# Patient Record
Sex: FEMALE | Race: BLACK OR AFRICAN AMERICAN | NOT HISPANIC OR LATINO | URBAN - METROPOLITAN AREA
[De-identification: names, ages, dates, MRNs, and addresses within clinical notes are randomized per-mention and may not be internally consistent; named-entity substitution may affect disease eponyms.]

---

## 2017-11-12 ENCOUNTER — EMERGENCY (EMERGENCY)
Facility: HOSPITAL | Age: 57
LOS: 1 days | Discharge: HOME | End: 2017-11-12
Attending: EMERGENCY MEDICINE | Admitting: EMERGENCY MEDICINE
Payer: SELF-PAY

## 2017-11-12 VITALS
DIASTOLIC BLOOD PRESSURE: 57 MMHG | OXYGEN SATURATION: 99 % | TEMPERATURE: 98 F | SYSTOLIC BLOOD PRESSURE: 100 MMHG | HEART RATE: 71 BPM | RESPIRATION RATE: 16 BRPM

## 2017-11-12 LAB
ALBUMIN SERPL ELPH-MCNC: 4.2 G/DL — SIGNIFICANT CHANGE UP (ref 3.3–5)
ALP SERPL-CCNC: 69 U/L — SIGNIFICANT CHANGE UP (ref 40–120)
ALT FLD-CCNC: 59 U/L — HIGH (ref 4–33)
AST SERPL-CCNC: 36 U/L — HIGH (ref 4–32)
BILIRUB SERPL-MCNC: 0.6 MG/DL — SIGNIFICANT CHANGE UP (ref 0.2–1.2)
BUN SERPL-MCNC: 10 MG/DL — SIGNIFICANT CHANGE UP (ref 7–23)
CALCIUM SERPL-MCNC: 9.4 MG/DL — SIGNIFICANT CHANGE UP (ref 8.4–10.5)
CHLORIDE SERPL-SCNC: 100 MMOL/L — SIGNIFICANT CHANGE UP (ref 98–107)
CK MB BLD-MCNC: 0.5 — SIGNIFICANT CHANGE UP (ref 0–2.5)
CK MB BLD-MCNC: 1 NG/ML — SIGNIFICANT CHANGE UP (ref 1–4.7)
CK MB BLD-MCNC: 1 NG/ML — SIGNIFICANT CHANGE UP (ref 1–4.7)
CK SERPL-CCNC: 197 U/L — HIGH (ref 25–170)
CK SERPL-CCNC: 201 U/L — HIGH (ref 25–170)
CO2 SERPL-SCNC: 28 MMOL/L — SIGNIFICANT CHANGE UP (ref 22–31)
CREAT SERPL-MCNC: 0.65 MG/DL — SIGNIFICANT CHANGE UP (ref 0.5–1.3)
D DIMER BLD IA.RAPID-MCNC: < 150 NG/ML — SIGNIFICANT CHANGE UP
GLUCOSE SERPL-MCNC: 103 MG/DL — HIGH (ref 70–99)
HBA1C BLD-MCNC: 5.6 % — SIGNIFICANT CHANGE UP (ref 4–5.6)
HCT VFR BLD CALC: 41.2 % — SIGNIFICANT CHANGE UP (ref 34.5–45)
HGB BLD-MCNC: 13.3 G/DL — SIGNIFICANT CHANGE UP (ref 11.5–15.5)
MCHC RBC-ENTMCNC: 26.7 PG — LOW (ref 27–34)
MCHC RBC-ENTMCNC: 32.3 % — SIGNIFICANT CHANGE UP (ref 32–36)
MCV RBC AUTO: 82.6 FL — SIGNIFICANT CHANGE UP (ref 80–100)
NRBC # FLD: 0 — SIGNIFICANT CHANGE UP
PLATELET # BLD AUTO: 230 K/UL — SIGNIFICANT CHANGE UP (ref 150–400)
PMV BLD: 11.9 FL — SIGNIFICANT CHANGE UP (ref 7–13)
POTASSIUM SERPL-MCNC: 3.9 MMOL/L — SIGNIFICANT CHANGE UP (ref 3.5–5.3)
POTASSIUM SERPL-SCNC: 3.9 MMOL/L — SIGNIFICANT CHANGE UP (ref 3.5–5.3)
PROT SERPL-MCNC: 7.7 G/DL — SIGNIFICANT CHANGE UP (ref 6–8.3)
RBC # BLD: 4.99 M/UL — SIGNIFICANT CHANGE UP (ref 3.8–5.2)
RBC # FLD: 14.8 % — HIGH (ref 10.3–14.5)
SODIUM SERPL-SCNC: 140 MMOL/L — SIGNIFICANT CHANGE UP (ref 135–145)
TROPONIN T SERPL-MCNC: < 0.06 NG/ML — SIGNIFICANT CHANGE UP (ref 0–0.06)
TROPONIN T SERPL-MCNC: < 0.06 NG/ML — SIGNIFICANT CHANGE UP (ref 0–0.06)
WBC # BLD: 3.81 K/UL — SIGNIFICANT CHANGE UP (ref 3.8–10.5)
WBC # FLD AUTO: 3.81 K/UL — SIGNIFICANT CHANGE UP (ref 3.8–10.5)

## 2017-11-12 PROCEDURE — 93010 ELECTROCARDIOGRAM REPORT: CPT | Mod: 59

## 2017-11-12 PROCEDURE — 93970 EXTREMITY STUDY: CPT | Mod: 26

## 2017-11-12 PROCEDURE — 99218: CPT

## 2017-11-12 PROCEDURE — 71020: CPT | Mod: 26

## 2017-11-12 RX ORDER — METOCLOPRAMIDE HCL 10 MG
10 TABLET ORAL ONCE
Qty: 0 | Refills: 0 | Status: COMPLETED | OUTPATIENT
Start: 2017-11-12 | End: 2017-11-12

## 2017-11-12 RX ORDER — GABAPENTIN 400 MG/1
300 CAPSULE ORAL ONCE
Qty: 0 | Refills: 0 | Status: COMPLETED | OUTPATIENT
Start: 2017-11-12 | End: 2017-11-12

## 2017-11-12 RX ORDER — ASPIRIN/CALCIUM CARB/MAGNESIUM 324 MG
162 TABLET ORAL ONCE
Qty: 0 | Refills: 0 | Status: COMPLETED | OUTPATIENT
Start: 2017-11-12 | End: 2017-11-12

## 2017-11-12 RX ORDER — GABAPENTIN 400 MG/1
300 CAPSULE ORAL
Qty: 0 | Refills: 0 | Status: DISCONTINUED | OUTPATIENT
Start: 2017-11-12 | End: 2017-11-16

## 2017-11-12 RX ORDER — GABAPENTIN 400 MG/1
0 CAPSULE ORAL
Qty: 0 | Refills: 0 | COMMUNITY

## 2017-11-12 RX ORDER — DIMETHYL FUMARATE 240 MG/1
240 CAPSULE ORAL
Qty: 0 | Refills: 0 | Status: DISCONTINUED | OUTPATIENT
Start: 2017-11-12 | End: 2017-11-16

## 2017-11-12 RX ORDER — DIMETHYL FUMARATE 240 MG/1
1 CAPSULE ORAL
Qty: 0 | Refills: 0 | COMMUNITY

## 2017-11-12 RX ADMIN — Medication 10 MILLIGRAM(S): at 21:25

## 2017-11-12 RX ADMIN — GABAPENTIN 300 MILLIGRAM(S): 400 CAPSULE ORAL at 21:25

## 2017-11-12 RX ADMIN — Medication 162 MILLIGRAM(S): at 18:15

## 2017-11-12 NOTE — ED CDU PROVIDER INITIAL DAY NOTE - MEDICAL DECISION MAKING DETAILS
Patient with CP and SOB- negative d-dimer and UD lower extremities, non ischemic ECG- will do serial enzymes and  pharm nuclear stress. Patient with CP and SOB- negative d-dimer and US lower extremities, non ischemic ECG- will do serial enzymes and  pharm nuclear stress.

## 2017-11-12 NOTE — ED PROVIDER NOTE - MEDICAL DECISION MAKING DETAILS
Chest pain , SOB with risk factors for PE, labs CTPA, us lower extrem ecg CE Chest pain , SOB with risk factors for PE, labs CTPA if d-dimer positive,, us lower extrem, ecg,CE

## 2017-11-12 NOTE — ED PROVIDER NOTE - INTERPRETATION
normal sinus rhythm, Normal axis, Normal IA interval and QRS complex. There are no acute ischemic ST or T-wave changes.

## 2017-11-12 NOTE — ED PROVIDER NOTE - MUSCULOSKELETAL MINIMAL EXAM
bilateral nterior thigh tenderness and calf tenderness, lower extremity nonpitting edema pulses present and equal/TENDERNESS/atraumatic/normal range of motion

## 2017-11-12 NOTE — ED ADULT NURSE NOTE - OBJECTIVE STATEMENT
Pt. A&Ox4, c/o SOB, cp, weakness and palpitations since 1:30p. Pmhx of MS. Pt. states she had similar sxs in the past but no known cause. CP is described as constant, sharp 5/10 pain,. located to left side #20g IV placed in right AC, labs sent. MD at bedside for eval. VSS, breathing well on RA. Non-labored at this time. Awaiting US and cxr. Will continue to monitor.

## 2017-11-12 NOTE — ED ADULT TRIAGE NOTE - CHIEF COMPLAINT QUOTE
Pt brought in by EMS from home complaining of sudden onset of SOB earlier today, respirations appear even and unlabored at this time. PMHx of MS. Pt denies chest pain, SOB, N/V/D.

## 2017-11-12 NOTE — ED PROVIDER NOTE - OBJECTIVE STATEMENT
sudden onset SOB with pleuritic left CP, lasted 5 min , after eating breakfast( oatmeal). Has hx of MS, uses cane but ambulatory, had trip from [place] October. C/O chronic leg pain and hip pain, notes some swelling lower extremities. Still feels a little SOB, mild substernal and left sided CP. No fever, no n/v/ abd pain. Not having flare at present of MS. Takes gabapentin and Tecfira sudden onset SOB with pleuritic left CP, lasted 5 min , after eating breakfast( oatmeal) today. Has hx of MS, uses cane but ambulatory, had trip from [place] October. C/O chronic leg pain and hip pain, notes some swelling lower extremities. Still feels a little SOB, mild substernal and left sided CP. No fever, no n/v/ abd pain. Not having flare at present of MS. Takes gabapentin and Tecfira

## 2017-11-12 NOTE — ED SUB INTERN NOTE - OBJECTIVE STATEMENT FT
56 yo female with PMH of MS on Tecfidera and gabapentin present to the ED with difficulty breathing today. Pt states as she sat at the dining table eating oatmeal she could not breathe. Recent travel from [place] in October 2017. Denies fever/chills, palpitations, N/V/D, cough

## 2017-11-12 NOTE — ED CDU PROVIDER INITIAL DAY NOTE - OBJECTIVE STATEMENT
57 year old female with sudden onset SOB with pleuritic left CP, lasted 5 min , after eating breakfast( oatmeal) today. Has hx of MS, uses cane but ambulatory, had trip from [place] October. C/O chronic leg pain and hip pain, notes some swelling lower extremities. Still feels a little SOB, mild substernal and left sided CP. No fever, no n/v/ abd pain. Not having flare at present of MS. Takes gabapentin and Tecfira no DM, HTN, smoking, HLD oe FH 57 year old female with sudden onset SOB with pleuritic left CP, lasted 5 min , after eating breakfast( oatmeal) today. Has hx of MS, uses cane but ambulatory, had trip from [place] October. C/O chronic leg pain and hip pain, notes some swelling lower extremities. Still feels a little SOB, mild substernal and left sided CP. No fever, no n/v/ abd pain. Not having flare at present of MS. Takes gabapentin and Tecfidera no DM, HTN, smoking, HLD or FH

## 2017-11-12 NOTE — ED CDU PROVIDER INITIAL DAY NOTE - PROGRESS NOTE DETAILS
HANG Han: pt NAD, VSS, pt feels like her breathing is still not at her baseline and having mild sternal/left sided chest pain. PE: cardiac: RRR, Lungs: CTA, abdomen: soft, nontender, nondistended. No events on tele - will continue to monitor. Discussed the results of the labs/imaging with the patient as well as the plan of care. Pending CE @22:00 and stress in am.

## 2017-11-13 VITALS
TEMPERATURE: 99 F | HEART RATE: 81 BPM | RESPIRATION RATE: 16 BRPM | DIASTOLIC BLOOD PRESSURE: 55 MMHG | OXYGEN SATURATION: 98 % | SYSTOLIC BLOOD PRESSURE: 103 MMHG

## 2017-11-13 PROCEDURE — 99217: CPT

## 2017-11-13 PROCEDURE — 93016 CV STRESS TEST SUPVJ ONLY: CPT | Mod: GC

## 2017-11-13 PROCEDURE — 78452 HT MUSCLE IMAGE SPECT MULT: CPT | Mod: 26

## 2017-11-13 PROCEDURE — 93018 CV STRESS TEST I&R ONLY: CPT | Mod: GC

## 2017-11-13 RX ORDER — ACETAMINOPHEN 500 MG
1000 TABLET ORAL ONCE
Qty: 0 | Refills: 0 | Status: DISCONTINUED | OUTPATIENT
Start: 2017-11-13 | End: 2017-11-13

## 2017-11-13 RX ORDER — METOCLOPRAMIDE HCL 10 MG
10 TABLET ORAL ONCE
Qty: 0 | Refills: 0 | Status: COMPLETED | OUTPATIENT
Start: 2017-11-13 | End: 2017-11-13

## 2017-11-13 RX ORDER — ACETAMINOPHEN 500 MG
1000 TABLET ORAL ONCE
Qty: 0 | Refills: 0 | Status: COMPLETED | OUTPATIENT
Start: 2017-11-13 | End: 2017-11-13

## 2017-11-13 RX ADMIN — Medication 400 MILLIGRAM(S): at 13:20

## 2017-11-13 RX ADMIN — DIMETHYL FUMARATE 240 MILLIGRAM(S): 240 CAPSULE ORAL at 09:26

## 2017-11-13 RX ADMIN — GABAPENTIN 300 MILLIGRAM(S): 400 CAPSULE ORAL at 09:25

## 2017-11-13 RX ADMIN — Medication 10 MILLIGRAM(S): at 13:09

## 2017-11-13 RX ADMIN — Medication 1000 MILLIGRAM(S): at 14:00

## 2017-11-13 NOTE — ED CDU PROVIDER SUBSEQUENT DAY NOTE - ATTENDING CONTRIBUTION TO CARE
STEVEN MARROQUIN  ATTENDING, MD: 58 yo F with a past medical history of MS, a/w B/L LE neuropathy presents with sudden onset of non-exertional SSCP that radiated to the left pectoral region with SOB lasting for about for 5 minutes.  Pt has chronic leg pain ambulates with a cane.  Denies F/C, N/V/D, abdominal pain, urinary complaints, no past medical history of clots or clotting d/o.  Patient states chest pain has resolved, but c/o HA which started last night and resolved with medication (Reglan as per EMR), but returned today.  Patient has a past medical history of migraines, but states this feels slightly sharp which is not typical of her migraines.  No focal deficits a/w HA.  No F/C, no photophobia.  No N/V.  No visual changes.  CE x 2 neg, stress test shows no evidence of infarct, but shows a small LV.  Tx HA and reassess.  STEVEN MARROQUIN, ATTENDING NOTE:  Patient is awake and alert and in no acute distress.  Normocephalic/atraumatic.  Auricles are normal.  Neck supple.  Lungs CTAB, no wheeze, no rhonchi,  no rales.  Heart is regular rate and rhythm.  Abdomen is soft, not distended +BS.  Back is nontender, no CVAT.  Moving all 4 extremities.   Neurologically grossly intact.  Affect is appropriate.   DR. MARROQUIN, ATTENDING MD-  I performed a face to face bedside interview with patient regarding history of present illness, review of symptoms and past medical history. I completed an independent physical exam.  I was not present for evaluation of this patient but I agree with note as stated above.  This includes during the time I functioned as the attending physician for this patient.

## 2017-11-13 NOTE — ED CDU PROVIDER SUBSEQUENT DAY NOTE - MEDICAL DECISION MAKING DETAILS
57 year old female with a  PMHx of MS, neuropathy pw sudden onset of nonexertional substernal chest pain that radiated to the left pectoral region associated with SOB for 5 minutes.  Plan: stress am

## 2017-11-13 NOTE — ED CDU PROVIDER SUBSEQUENT DAY NOTE - PROGRESS NOTE DETAILS
HANG Han: pt NAD, VSS, asymptomatic overnight - resting comfortably. No events on tele. EKG/CE wnl. Pending stress in am. HANG Palma: Pt reports mild right sided headache consistent with her migraines will Tylenol/Reglan.  Pt resting comfortably in bed NAD, pt awaiting stress test results. HANG Palma: pt feels better headache improved.  Stress test negative.  Discharge and results reviewed and discussed with patient.

## 2017-11-13 NOTE — ED CDU PROVIDER DISPOSITION NOTE - ATTENDING CONTRIBUTION TO CARE
DISCHARGE NOTE-STEVEN GARRIDO MD   Patient is alert and oriented to person, place and date.  Patient is appropriate.  Patient denies any new or worsening physical complaints.  Patient feeling better and wants to go home.  Patient is awake and alert and in no acute distress.  Normocephalic/atraumatic.  Auricles are normal.  Neck supple.  Lungs CTAB, no wheeze, no rhonchi,  no rales.  Heart is regular rate and rhythm.  Back is nontender.  Moving all 4 extremities.   Neurologically grossly intact.  Affect is appropriate. Results of testing discussed with patient at bedside.   Copies of results provided to patient for discharge.  Patient understands that they develop new or worsening symptoms, to return to the ER immediately or call their PMD.  Patient's vital signs remain stable.  Stable for discharge.    Discharge to home.    -Steven Garrido MD

## 2017-11-13 NOTE — ED CDU PROVIDER DISPOSITION NOTE - CLINICAL COURSE
STEVEN MARROQUIN  ATTENDING, MD: 56 yo F with a past medical history of MS, a/w B/L LE neuropathy presents with sudden onset of non-exertional SSCP that radiated to the left pectoral region with SOB lasting for about for 5 minutes.  Pt has chronic leg pain ambulates with a cane.  Denies F/C, N/V/D, abdominal pain, urinary complaints, no past medical history of clots or clotting d/o.  Patient states chest pain has resolved, but c/o HA which started last night and resolved with medication (Reglan as per EMR), but returned today.  Patient has a past medical history of migraines, but states this feels slightly sharp which is not typical of her migraines.  No focal deficits a/w HA.  No F/C, no photophobia.  No N/V.  No visual changes.  CE x 2 neg, stress test shows no evidence of infarct, but shows a small LV.  FIGUEREDO resolved.  STEVEN MARROQUIN, ATTENDING NOTE:  Patient is awake and alert and in no acute distress.  Normocephalic/atraumatic.  Auricles are normal.  Neck supple.  Lungs CTAB, no wheeze, no rhonchi,  no rales.  Heart is regular rate and rhythm.  Abdomen is soft, not distended +BS.  Back is nontender, no CVAT.  Moving all 4 extremities.   Neurologically grossly intact.  Affect is appropriate.

## 2017-11-13 NOTE — ED CDU PROVIDER SUBSEQUENT DAY NOTE - HISTORY
57 year old female with a  PMHx of MS, neuropathy pw sudden onset of nonexertional substernal chest pain that radiated to the left pectoral region associated with SOB for 5 minutes. Pt states that she was eating breakfast and a few minutes after the symptoms occurred (pt was at rest at the time).+travel hx fo [place] 10/17. Pt has chronic neuropathy leg pain bilaterally – uses cane to ambulate. Denies n/v/f/c, abdominal pain, dysuria, hematuria, constipation, diarrhea, hematochezia, melena, hx of clots/DVT/PE, tobacco use.  In the ED: EKG NSR, cxr neg, ce#1 wnl, bilateral Doppler neg. Repeat CE wnl, rpt ekg unchanged. Plan: stress in am.

## 2017-11-14 ENCOUNTER — EMERGENCY (EMERGENCY)
Facility: HOSPITAL | Age: 57
LOS: 1 days | Discharge: HOME | End: 2017-11-14
Attending: EMERGENCY MEDICINE | Admitting: EMERGENCY MEDICINE
Payer: SELF-PAY

## 2017-11-14 VITALS
RESPIRATION RATE: 16 BRPM | DIASTOLIC BLOOD PRESSURE: 48 MMHG | SYSTOLIC BLOOD PRESSURE: 149 MMHG | TEMPERATURE: 98 F | HEART RATE: 71 BPM | OXYGEN SATURATION: 100 %

## 2017-11-14 VITALS
OXYGEN SATURATION: 100 % | SYSTOLIC BLOOD PRESSURE: 136 MMHG | HEART RATE: 87 BPM | RESPIRATION RATE: 16 BRPM | TEMPERATURE: 98 F | DIASTOLIC BLOOD PRESSURE: 76 MMHG

## 2017-11-14 DIAGNOSIS — Z98.891 HISTORY OF UTERINE SCAR FROM PREVIOUS SURGERY: Chronic | ICD-10-CM

## 2017-11-14 PROCEDURE — 99283 EMERGENCY DEPT VISIT LOW MDM: CPT

## 2017-11-14 RX ORDER — IBUPROFEN 200 MG
600 TABLET ORAL ONCE
Qty: 0 | Refills: 0 | Status: COMPLETED | OUTPATIENT
Start: 2017-11-14 | End: 2017-11-14

## 2017-11-14 NOTE — ED ADULT NURSE NOTE - OBJECTIVE STATEMENT
pt. received in room 21 , A&Ox4 c/o midsternal chest pain , tender to touch. Pt. states to have a hx. MS  with ED visit 2xdays for sob. Pt. Vitals as noted, and in no acute distress. Placed on cardiac monitor. As per MD no labs needed at this moment. Will continue to monitor while in the ED.

## 2017-11-14 NOTE — ED PROVIDER NOTE - OBJECTIVE STATEMENT
Patient is a 58 y/o F w/ PMHx of MS presenting to the ED w/ SOB and pleuritic CP for 2 days in duration. Patient was recently admitted 11/2/17 w/ similar complaints and sent home after observation. Patient states she feels SOB that is exacerbated by walking. Patient uses cane to ambulate. Patient states that the SOB is intermittent. Patient denies dizziness, lightheadedness, headahce, fever, chills, NVD, palpitations, syncope, abd pain. Patient is a 56 y/o F w/ PMHx of MS presenting to the ED w/ SOB and pleuritic CP for 2 days in duration. Patient was recently admitted 11/2/17 w/ similar complaints and sent home after neg stress test, two neg trops, neg BLE doppler.  Patient states she feels SOB that is exacerbated by walking. Patient uses cane to ambulate. Patient states that the SOB is intermittent. Patient denies dizziness, lightheadedness, headahce, fever, chills, NVD, palpitations, syncope, abd pain.

## 2017-11-14 NOTE — ED PROVIDER NOTE - MEDICAL DECISION MAKING DETAILS
58 y/o F/ PMHx of MS presents to the ED w/ SOB and pleuritic CP. Low risk for PE, cardiac related etiology.  Will obtain cxr, ekg, give motrin for pain and reassess. 58 y/o F/ PMHx of MS presents to the ED w/ SOB and pleuritic CP. Low risk for PE, cardiac related etiology. Will obtain cxr, ekg, give motrin for pain and reassess.    Cabot: 57F with PMH of MS coming in with 2 days of intermittent CP and SOB.  She was admitted to the CDU yesterday, where she had neg stress test, two neg trops, neg BLE doppler.  She was discharged home and comes back today with the same complaints.  No F/C/cough/sputum.  No N/V/diaphoresis.  + pain at costochondral junction on L.  On exam, HDS, looks well, lungs CTAB, heart sounds normal, + TTP at L costochondral junction, no rashes, radial pulses 2+ bilat, no LE swelling or tenderness.  Likely costochondritis.  Had extensive workup yesterday that makes ACS, PE, dissection, PNA, PTX very unlikely.  Will treat pain and check EKG and CXR.

## 2017-11-14 NOTE — ED PROVIDER NOTE - PROGRESS NOTE DETAILS
Dr. Martin PGY1 - Patient is hemodynamically stable. Patient does not report any CP, dyspnea, SOB or any further/new complaints. Patient is stable to be medically discharged. Patient can follow up as outpatient with a primary cardiologist.

## 2017-11-14 NOTE — ED ADULT TRIAGE NOTE - CHIEF COMPLAINT QUOTE
Pt arrives w/ c/o intermittent episodes of SOB accompanied w/ chest pain. Denies nausea or vomiting. Hx of MS.

## 2017-11-14 NOTE — ED PROVIDER NOTE - ATTENDING CONTRIBUTION TO CARE
I, Jennifer Cabot, MD, have performed a history and physical exam of the patient and discussed their management with the resident. I reviewed the resident's note and agree with the documented findings and plan of care. My medical decision making and observations are found above.    Cabot: 57F with PMH of MS coming in with 2 days of intermittent CP and SOB.  She was admitted to the CDU yesterday, where she had neg stress test, two neg trops, neg BLE doppler.  She was discharged home and comes back today with the same complaints.  No F/C/cough/sputum.  No N/V/diaphoresis.  + pain at costochondral junction on L.  On exam, HDS, looks well, lungs CTAB, heart sounds normal, + TTP at L costochondral junction, no rashes, radial pulses 2+ bilat, no LE swelling or tenderness.  Likely costochondritis.  Had extensive workup yesterday that makes ACS, PE, dissection, PNA, PTX very unlikely.  Will treat pain and check EKG and CXR.

## 2017-11-15 PROCEDURE — 71020: CPT | Mod: 26

## 2017-11-15 RX ORDER — IBUPROFEN 200 MG
1 TABLET ORAL
Qty: 14 | Refills: 0 | OUTPATIENT
Start: 2017-11-15 | End: 2017-11-29

## 2017-11-15 RX ADMIN — Medication 600 MILLIGRAM(S): at 00:20

## 2017-12-17 ENCOUNTER — EMERGENCY (EMERGENCY)
Facility: HOSPITAL | Age: 57
LOS: 1 days | Discharge: HOME | End: 2017-12-17
Attending: EMERGENCY MEDICINE | Admitting: EMERGENCY MEDICINE
Payer: SELF-PAY

## 2017-12-17 VITALS
SYSTOLIC BLOOD PRESSURE: 127 MMHG | DIASTOLIC BLOOD PRESSURE: 72 MMHG | HEART RATE: 74 BPM | RESPIRATION RATE: 18 BRPM | OXYGEN SATURATION: 100 % | TEMPERATURE: 98 F

## 2017-12-17 DIAGNOSIS — Z98.891 HISTORY OF UTERINE SCAR FROM PREVIOUS SURGERY: Chronic | ICD-10-CM

## 2017-12-17 LAB
ALBUMIN SERPL ELPH-MCNC: 4.2 G/DL — SIGNIFICANT CHANGE UP (ref 3.3–5)
ALP SERPL-CCNC: 72 U/L — SIGNIFICANT CHANGE UP (ref 40–120)
ALT FLD-CCNC: 46 U/L — HIGH (ref 4–33)
AST SERPL-CCNC: 26 U/L — SIGNIFICANT CHANGE UP (ref 4–32)
BASOPHILS # BLD AUTO: 0.04 K/UL — SIGNIFICANT CHANGE UP (ref 0–0.2)
BASOPHILS NFR BLD AUTO: 1 % — SIGNIFICANT CHANGE UP (ref 0–2)
BILIRUB SERPL-MCNC: 0.6 MG/DL — SIGNIFICANT CHANGE UP (ref 0.2–1.2)
BUN SERPL-MCNC: 8 MG/DL — SIGNIFICANT CHANGE UP (ref 7–23)
CALCIUM SERPL-MCNC: 9.3 MG/DL — SIGNIFICANT CHANGE UP (ref 8.4–10.5)
CHLORIDE SERPL-SCNC: 103 MMOL/L — SIGNIFICANT CHANGE UP (ref 98–107)
CO2 SERPL-SCNC: 27 MMOL/L — SIGNIFICANT CHANGE UP (ref 22–31)
CREAT SERPL-MCNC: 0.6 MG/DL — SIGNIFICANT CHANGE UP (ref 0.5–1.3)
EOSINOPHIL # BLD AUTO: 0.1 K/UL — SIGNIFICANT CHANGE UP (ref 0–0.5)
EOSINOPHIL NFR BLD AUTO: 2.6 % — SIGNIFICANT CHANGE UP (ref 0–6)
GLUCOSE SERPL-MCNC: 94 MG/DL — SIGNIFICANT CHANGE UP (ref 70–99)
HCT VFR BLD CALC: 41.9 % — SIGNIFICANT CHANGE UP (ref 34.5–45)
HGB BLD-MCNC: 13.4 G/DL — SIGNIFICANT CHANGE UP (ref 11.5–15.5)
IMM GRANULOCYTES # BLD AUTO: 0.02 # — SIGNIFICANT CHANGE UP
IMM GRANULOCYTES NFR BLD AUTO: 0.5 % — SIGNIFICANT CHANGE UP (ref 0–1.5)
LYMPHOCYTES # BLD AUTO: 1.17 K/UL — SIGNIFICANT CHANGE UP (ref 1–3.3)
LYMPHOCYTES # BLD AUTO: 30.2 % — SIGNIFICANT CHANGE UP (ref 13–44)
MCHC RBC-ENTMCNC: 26.5 PG — LOW (ref 27–34)
MCHC RBC-ENTMCNC: 32 % — SIGNIFICANT CHANGE UP (ref 32–36)
MCV RBC AUTO: 83 FL — SIGNIFICANT CHANGE UP (ref 80–100)
MONOCYTES # BLD AUTO: 0.42 K/UL — SIGNIFICANT CHANGE UP (ref 0–0.9)
MONOCYTES NFR BLD AUTO: 10.8 % — SIGNIFICANT CHANGE UP (ref 2–14)
NEUTROPHILS # BLD AUTO: 2.13 K/UL — SIGNIFICANT CHANGE UP (ref 1.8–7.4)
NEUTROPHILS NFR BLD AUTO: 54.9 % — SIGNIFICANT CHANGE UP (ref 43–77)
NRBC # FLD: 0 — SIGNIFICANT CHANGE UP
PLATELET # BLD AUTO: 229 K/UL — SIGNIFICANT CHANGE UP (ref 150–400)
PMV BLD: 11.6 FL — SIGNIFICANT CHANGE UP (ref 7–13)
POTASSIUM SERPL-MCNC: 4.6 MMOL/L — SIGNIFICANT CHANGE UP (ref 3.5–5.3)
POTASSIUM SERPL-SCNC: 4.6 MMOL/L — SIGNIFICANT CHANGE UP (ref 3.5–5.3)
PROT SERPL-MCNC: 7.8 G/DL — SIGNIFICANT CHANGE UP (ref 6–8.3)
RBC # BLD: 5.05 M/UL — SIGNIFICANT CHANGE UP (ref 3.8–5.2)
RBC # FLD: 14.6 % — HIGH (ref 10.3–14.5)
SODIUM SERPL-SCNC: 142 MMOL/L — SIGNIFICANT CHANGE UP (ref 135–145)
WBC # BLD: 3.88 K/UL — SIGNIFICANT CHANGE UP (ref 3.8–10.5)
WBC # FLD AUTO: 3.88 K/UL — SIGNIFICANT CHANGE UP (ref 3.8–10.5)

## 2017-12-17 PROCEDURE — 99283 EMERGENCY DEPT VISIT LOW MDM: CPT

## 2017-12-17 RX ORDER — KETOROLAC TROMETHAMINE 30 MG/ML
15 SYRINGE (ML) INJECTION ONCE
Qty: 0 | Refills: 0 | Status: DISCONTINUED | OUTPATIENT
Start: 2017-12-17 | End: 2017-12-17

## 2017-12-17 RX ORDER — ACETAMINOPHEN 500 MG
1 TABLET ORAL
Qty: 50 | Refills: 0 | OUTPATIENT
Start: 2017-12-17

## 2017-12-17 RX ORDER — METOCLOPRAMIDE HCL 10 MG
10 TABLET ORAL ONCE
Qty: 0 | Refills: 0 | Status: COMPLETED | OUTPATIENT
Start: 2017-12-17 | End: 2017-12-17

## 2017-12-17 RX ORDER — METOCLOPRAMIDE HCL 10 MG
1 TABLET ORAL
Qty: 15 | Refills: 0 | OUTPATIENT
Start: 2017-12-17 | End: 2017-12-21

## 2017-12-17 RX ORDER — SODIUM CHLORIDE 9 MG/ML
2000 INJECTION INTRAMUSCULAR; INTRAVENOUS; SUBCUTANEOUS ONCE
Qty: 0 | Refills: 0 | Status: COMPLETED | OUTPATIENT
Start: 2017-12-17 | End: 2017-12-17

## 2017-12-17 RX ADMIN — Medication 15 MILLIGRAM(S): at 14:38

## 2017-12-17 RX ADMIN — SODIUM CHLORIDE 1000 MILLILITER(S): 9 INJECTION INTRAMUSCULAR; INTRAVENOUS; SUBCUTANEOUS at 14:38

## 2017-12-17 RX ADMIN — Medication 10 MILLIGRAM(S): at 14:38

## 2017-12-17 NOTE — ED PROVIDER NOTE - CARE PLAN
Instructions for follow-up, activity and diet:	Take medications as prescribed. Follow up with your healthcare provider about this issue (these issues): migraine. Return if pain not controlled with oral medications. Principal Discharge DX:	Migraine  Instructions for follow-up, activity and diet:	Take medications as prescribed. Follow up with your healthcare provider about this issue (these issues): migraine. Return if pain not controlled with oral medications.

## 2017-12-17 NOTE — ED PROVIDER NOTE - CHPI ED SYMPTOMS NEG
no dizziness/no fever/no loss of consciousness/no blurred vision/no change in level of consciousness/no confusion

## 2017-12-17 NOTE — ED PROVIDER NOTE - OBJECTIVE STATEMENT
Carlos:  57 F, has MS and migraines, p/w migraine HA x 2 days: R-sided and behind eyes, pulsing, photophobia, no F. Not improve w/ Excedrin.

## 2017-12-17 NOTE — ED PROVIDER NOTE - PLAN OF CARE
Take medications as prescribed. Follow up with your healthcare provider about this issue (these issues): migraine. Return if pain not controlled with oral medications.

## 2019-08-22 NOTE — ED CDU PROVIDER INITIAL DAY NOTE - PSYCHIATRIC, MLM
Internal Medicine Alert and oriented to person, place, time/situation. normal mood and affect. no apparent risk to self or others.

## 2019-08-30 NOTE — ED PROVIDER NOTE - NS_EDPROVIDERDISPOUSERTYPE_ED_A_ED
Internal Medicine Attending Addendum:  I saw and evaluated the patient. I discussed the patient's case with resident Fernando Talyor MD . I agree with resident's findings and plan as documented in today's note.     Dr. Haydee Adkins MD Attending Attestation (For Attendings USE Only)...

## 2023-02-16 ENCOUNTER — EMERGENCY (EMERGENCY)
Facility: HOSPITAL | Age: 63
LOS: 1 days | Discharge: HOME | End: 2023-02-16
Attending: EMERGENCY MEDICINE | Admitting: EMERGENCY MEDICINE
Payer: MEDICAID

## 2023-02-16 VITALS
OXYGEN SATURATION: 100 % | HEART RATE: 70 BPM | DIASTOLIC BLOOD PRESSURE: 70 MMHG | RESPIRATION RATE: 18 BRPM | SYSTOLIC BLOOD PRESSURE: 147 MMHG | TEMPERATURE: 98 F

## 2023-02-16 VITALS
OXYGEN SATURATION: 100 % | TEMPERATURE: 98 F | SYSTOLIC BLOOD PRESSURE: 139 MMHG | RESPIRATION RATE: 16 BRPM | DIASTOLIC BLOOD PRESSURE: 68 MMHG | HEART RATE: 71 BPM

## 2023-02-16 DIAGNOSIS — Z98.891 HISTORY OF UTERINE SCAR FROM PREVIOUS SURGERY: Chronic | ICD-10-CM

## 2023-02-16 PROBLEM — G43.909 MIGRAINE, UNSPECIFIED, NOT INTRACTABLE, WITHOUT STATUS MIGRAINOSUS: Chronic | Status: ACTIVE | Noted: 2017-12-17

## 2023-02-16 LAB
ALBUMIN SERPL ELPH-MCNC: 4.3 G/DL — SIGNIFICANT CHANGE UP (ref 3.3–5)
ALP SERPL-CCNC: 97 U/L — SIGNIFICANT CHANGE UP (ref 40–120)
ALT FLD-CCNC: 33 U/L — SIGNIFICANT CHANGE UP (ref 4–33)
ANION GAP SERPL CALC-SCNC: 12 MMOL/L — SIGNIFICANT CHANGE UP (ref 7–14)
APPEARANCE UR: ABNORMAL
AST SERPL-CCNC: 23 U/L — SIGNIFICANT CHANGE UP (ref 4–32)
BACTERIA # UR AUTO: ABNORMAL
BASOPHILS # BLD AUTO: 0.03 K/UL — SIGNIFICANT CHANGE UP (ref 0–0.2)
BASOPHILS NFR BLD AUTO: 0.5 % — SIGNIFICANT CHANGE UP (ref 0–2)
BILIRUB SERPL-MCNC: 0.6 MG/DL — SIGNIFICANT CHANGE UP (ref 0.2–1.2)
BILIRUB UR-MCNC: NEGATIVE — SIGNIFICANT CHANGE UP
BUN SERPL-MCNC: 13 MG/DL — SIGNIFICANT CHANGE UP (ref 7–23)
CALCIUM SERPL-MCNC: 9.8 MG/DL — SIGNIFICANT CHANGE UP (ref 8.4–10.5)
CHLORIDE SERPL-SCNC: 103 MMOL/L — SIGNIFICANT CHANGE UP (ref 98–107)
CO2 SERPL-SCNC: 25 MMOL/L — SIGNIFICANT CHANGE UP (ref 22–31)
COLOR SPEC: YELLOW — SIGNIFICANT CHANGE UP
COMMENT - URINE: SIGNIFICANT CHANGE UP
CREAT SERPL-MCNC: 0.64 MG/DL — SIGNIFICANT CHANGE UP (ref 0.5–1.3)
DIFF PNL FLD: NEGATIVE — SIGNIFICANT CHANGE UP
EGFR: 100 ML/MIN/1.73M2 — SIGNIFICANT CHANGE UP
EOSINOPHIL # BLD AUTO: 0.18 K/UL — SIGNIFICANT CHANGE UP (ref 0–0.5)
EOSINOPHIL NFR BLD AUTO: 3.1 % — SIGNIFICANT CHANGE UP (ref 0–6)
EPI CELLS # UR: 19 /HPF — HIGH (ref 0–5)
GLUCOSE SERPL-MCNC: 98 MG/DL — SIGNIFICANT CHANGE UP (ref 70–99)
GLUCOSE UR QL: NEGATIVE — SIGNIFICANT CHANGE UP
HCT VFR BLD CALC: 45.3 % — HIGH (ref 34.5–45)
HGB BLD-MCNC: 14.5 G/DL — SIGNIFICANT CHANGE UP (ref 11.5–15.5)
HYALINE CASTS # UR AUTO: 0 /LPF — SIGNIFICANT CHANGE UP (ref 0–7)
IANC: 3.58 K/UL — SIGNIFICANT CHANGE UP (ref 1.8–7.4)
IMM GRANULOCYTES NFR BLD AUTO: 0.5 % — SIGNIFICANT CHANGE UP (ref 0–0.9)
KETONES UR-MCNC: NEGATIVE — SIGNIFICANT CHANGE UP
LEUKOCYTE ESTERASE UR-ACNC: ABNORMAL
LYMPHOCYTES # BLD AUTO: 1.46 K/UL — SIGNIFICANT CHANGE UP (ref 1–3.3)
LYMPHOCYTES # BLD AUTO: 25 % — SIGNIFICANT CHANGE UP (ref 13–44)
MCHC RBC-ENTMCNC: 27.9 PG — SIGNIFICANT CHANGE UP (ref 27–34)
MCHC RBC-ENTMCNC: 32 GM/DL — SIGNIFICANT CHANGE UP (ref 32–36)
MCV RBC AUTO: 87.1 FL — SIGNIFICANT CHANGE UP (ref 80–100)
MONOCYTES # BLD AUTO: 0.56 K/UL — SIGNIFICANT CHANGE UP (ref 0–0.9)
MONOCYTES NFR BLD AUTO: 9.6 % — SIGNIFICANT CHANGE UP (ref 2–14)
NEUTROPHILS # BLD AUTO: 3.58 K/UL — SIGNIFICANT CHANGE UP (ref 1.8–7.4)
NEUTROPHILS NFR BLD AUTO: 61.3 % — SIGNIFICANT CHANGE UP (ref 43–77)
NITRITE UR-MCNC: NEGATIVE — SIGNIFICANT CHANGE UP
NRBC # BLD: 0 /100 WBCS — SIGNIFICANT CHANGE UP (ref 0–0)
NRBC # FLD: 0 K/UL — SIGNIFICANT CHANGE UP (ref 0–0)
PH UR: 6.5 — SIGNIFICANT CHANGE UP (ref 5–8)
PLATELET # BLD AUTO: 246 K/UL — SIGNIFICANT CHANGE UP (ref 150–400)
POTASSIUM SERPL-MCNC: 4.8 MMOL/L — SIGNIFICANT CHANGE UP (ref 3.5–5.3)
POTASSIUM SERPL-SCNC: 4.8 MMOL/L — SIGNIFICANT CHANGE UP (ref 3.5–5.3)
PROT SERPL-MCNC: 7.1 G/DL — SIGNIFICANT CHANGE UP (ref 6–8.3)
PROT UR-MCNC: ABNORMAL
RBC # BLD: 5.2 M/UL — SIGNIFICANT CHANGE UP (ref 3.8–5.2)
RBC # FLD: 12.7 % — SIGNIFICANT CHANGE UP (ref 10.3–14.5)
RBC CASTS # UR COMP ASSIST: 3 /HPF — SIGNIFICANT CHANGE UP (ref 0–4)
SODIUM SERPL-SCNC: 140 MMOL/L — SIGNIFICANT CHANGE UP (ref 135–145)
SP GR SPEC: 1.03 — SIGNIFICANT CHANGE UP (ref 1.01–1.05)
UROBILINOGEN FLD QL: SIGNIFICANT CHANGE UP
WBC # BLD: 5.84 K/UL — SIGNIFICANT CHANGE UP (ref 3.8–10.5)
WBC # FLD AUTO: 5.84 K/UL — SIGNIFICANT CHANGE UP (ref 3.8–10.5)
WBC UR QL: 6 /HPF — HIGH (ref 0–5)

## 2023-02-16 PROCEDURE — 74177 CT ABD & PELVIS W/CONTRAST: CPT | Mod: 26,MA

## 2023-02-16 PROCEDURE — 99053 MED SERV 10PM-8AM 24 HR FAC: CPT

## 2023-02-16 PROCEDURE — 99284 EMERGENCY DEPT VISIT MOD MDM: CPT

## 2023-02-16 RX ORDER — CYCLOBENZAPRINE HYDROCHLORIDE 10 MG/1
5 TABLET, FILM COATED ORAL ONCE
Refills: 0 | Status: COMPLETED | OUTPATIENT
Start: 2023-02-16 | End: 2023-02-16

## 2023-02-16 RX ORDER — KETOROLAC TROMETHAMINE 30 MG/ML
15 SYRINGE (ML) INJECTION ONCE
Refills: 0 | Status: DISCONTINUED | OUTPATIENT
Start: 2023-02-16 | End: 2023-02-16

## 2023-02-16 RX ORDER — ACETAMINOPHEN 500 MG
975 TABLET ORAL ONCE
Refills: 0 | Status: COMPLETED | OUTPATIENT
Start: 2023-02-16 | End: 2023-02-16

## 2023-02-16 RX ORDER — IBUPROFEN 200 MG
1 TABLET ORAL
Qty: 20 | Refills: 0
Start: 2023-02-16 | End: 2023-02-20

## 2023-02-16 RX ORDER — DIAZEPAM 5 MG
1 TABLET ORAL
Qty: 4 | Refills: 0
Start: 2023-02-16 | End: 2023-02-19

## 2023-02-16 RX ORDER — LIDOCAINE 4 G/100G
1 CREAM TOPICAL ONCE
Refills: 0 | Status: COMPLETED | OUTPATIENT
Start: 2023-02-16 | End: 2023-02-16

## 2023-02-16 RX ADMIN — Medication 975 MILLIGRAM(S): at 00:05

## 2023-02-16 RX ADMIN — Medication 975 MILLIGRAM(S): at 04:48

## 2023-02-16 RX ADMIN — CYCLOBENZAPRINE HYDROCHLORIDE 5 MILLIGRAM(S): 10 TABLET, FILM COATED ORAL at 07:17

## 2023-02-16 RX ADMIN — LIDOCAINE 1 PATCH: 4 CREAM TOPICAL at 04:51

## 2023-02-16 RX ADMIN — Medication 15 MILLIGRAM(S): at 05:00

## 2023-02-16 RX ADMIN — Medication 15 MILLIGRAM(S): at 04:48

## 2023-02-16 NOTE — ED ADULT TRIAGE NOTE - CHIEF COMPLAINT QUOTE
c/o right flank/hip pain x2days. pt states pain worsens on movement, no aggravating factor reported, and no urinary issues. HX MS

## 2023-02-16 NOTE — ED PROVIDER NOTE - PATIENT PORTAL LINK FT
You can access the FollowMyHealth Patient Portal offered by [facility] by registering at the following website: [email or web address]. By joining [employer]’s FollowMyHealth portal, you will also be able to view your health information using other applications (apps) compatible with our system.

## 2023-02-16 NOTE — ED PROVIDER NOTE - OBJECTIVE STATEMENT
Patient is a 62-year-old female with a history of MS who presents with back pain and flank pain.  Pain started about 2 days ago and has been intermittent.  Pain is on the right side of her back and radiates to her right lower quadrant.  Denies any dysuria, hematuria, fevers, chills, nausea, vomiting.  Pain is worse with certain leg movements but is present at rest.  Denies any history of kidney stones.  Still having normal bowel movements.

## 2023-02-16 NOTE — ED PROVIDER NOTE - NSFOLLOWUPINSTRUCTIONS_ED_ALL_ED_FT
Advance activity as tolerated.  Continue all previously prescribed medications as directed unless otherwise instructed.  Follow up with your primary care physician in 48-72 hours- bring copies of your results.  Return to the ER for worsening or persistent symptoms, and/or ANY NEW OR CONCERNING SYMPTOMS. If you have issues obtaining follow up, please call: [phone] to obtain a doctor or specialist who takes your insurance in your area.  You may call [phone] to make an appointment with the internal medicine clinic.

## 2023-02-16 NOTE — ED ADULT NURSE REASSESSMENT NOTE - NS ED NURSE REASSESS COMMENT FT1
Pt resting comfortably in bed, respirations are even and unlabored. Pt endorsing improvement in pain as charted. Pt awaiting CT read

## 2023-02-16 NOTE — ED ADULT NURSE NOTE - OBJECTIVE STATEMENT
Pt arrives to ED rm 5A A&Ox4 and ambulatory c/o R hip/flank pain for 2 days. Pt states that when the pain first started they took a gabapentin at home, it helped the pain for a short period of time, but yesterday the pain came back worse and the gabapentin didn't help. Pt states the pain gets worse with any movement. Pt denies falls or trauma to the area, urinary symptoms, last BM was yesterday and was normal to pt. Pt denies SOB, chest pain, nausea, vomiting and diarrhea. 20G placed to LAC, labs drawn and sent. Medicated as per MD order

## 2023-02-16 NOTE — ED PROVIDER NOTE - CLINICAL SUMMARY MEDICAL DECISION MAKING FREE TEXT BOX
Mariluz - Patient is a 62-year-old female with a history of Hashimoto's who presents with back pain and flank pain. ddx includes kidney stone vs msk vs appy. will check labs, CT abd, urine. pain control

## 2023-02-16 NOTE — ED PROVIDER NOTE - NS ED ROS FT
GENERAL: No fever, chills  EYES: no vision changes, no discharge.   ENT: no difficulty swallowing or speaking   CARDIAC: no chest pain/pressure, SOB, lower extremity swelling  PULMONARY: no cough, SOB  GI: no abdominal pain, n/v/d. +back/flank pain  : no dysuria  SKIN: no rashes  NEURO: no headache, lightheadedness, paresthesia  MSK: No joint pain, myalgia, weakness.

## 2023-02-16 NOTE — ED PROVIDER NOTE - PHYSICAL EXAMINATION
Mariana Mcgraw MD  GENERAL: Patient awake alert NAD.  HEENT: NC/AT, Moist mucous membranes, EOMI.  LUNGS: CTAB, no wheezes or crackles.   CARDIAC: RRR, no m/r/g.    ABDOMEN: Soft, +RLQ tenderness, ND, No rebound, guarding. +R CVA tenderness.   EXT: No edema. No calf tenderness.   MSK: No spinal tenderness, no pain with movement, no deformities. +R straight leg  NEURO: A&Ox3. Moving all extremities.   SKIN: Warm and dry. No rash.  PSYCH: Normal affect.

## 2023-02-17 LAB
CULTURE RESULTS: SIGNIFICANT CHANGE UP
SPECIMEN SOURCE: SIGNIFICANT CHANGE UP

## 2023-02-19 PROBLEM — G35 MULTIPLE SCLEROSIS: Chronic | Status: ACTIVE | Noted: 2017-11-12

## 2023-06-04 ENCOUNTER — EMERGENCY (EMERGENCY)
Facility: HOSPITAL | Age: 63
LOS: 1 days | Discharge: HOME | End: 2023-06-04
Attending: EMERGENCY MEDICINE
Payer: MEDICAID

## 2023-06-04 VITALS
HEIGHT: 63 IN | RESPIRATION RATE: 20 BRPM | SYSTOLIC BLOOD PRESSURE: 141 MMHG | DIASTOLIC BLOOD PRESSURE: 83 MMHG | HEART RATE: 61 BPM | TEMPERATURE: 98 F | OXYGEN SATURATION: 96 % | WEIGHT: 194.01 LBS

## 2023-06-04 DIAGNOSIS — Z98.891 HISTORY OF UTERINE SCAR FROM PREVIOUS SURGERY: Chronic | ICD-10-CM

## 2023-06-04 LAB
ALBUMIN SERPL ELPH-MCNC: 4.5 G/DL — SIGNIFICANT CHANGE UP (ref 3.3–5.2)
ALP SERPL-CCNC: 110 U/L — SIGNIFICANT CHANGE UP (ref 40–120)
ALT FLD-CCNC: 28 U/L — SIGNIFICANT CHANGE UP
ANION GAP SERPL CALC-SCNC: 11 MMOL/L — SIGNIFICANT CHANGE UP (ref 5–17)
APTT BLD: 34.7 SEC — SIGNIFICANT CHANGE UP (ref 27.5–35.5)
AST SERPL-CCNC: 22 U/L — SIGNIFICANT CHANGE UP
BASOPHILS # BLD AUTO: 0.05 K/UL — SIGNIFICANT CHANGE UP (ref 0–0.2)
BASOPHILS NFR BLD AUTO: 0.9 % — SIGNIFICANT CHANGE UP (ref 0–2)
BILIRUB SERPL-MCNC: 0.9 MG/DL — SIGNIFICANT CHANGE UP (ref 0.4–2)
BUN SERPL-MCNC: 12.5 MG/DL — SIGNIFICANT CHANGE UP (ref 8–20)
CALCIUM SERPL-MCNC: 9.6 MG/DL — SIGNIFICANT CHANGE UP (ref 8.4–10.5)
CHLORIDE SERPL-SCNC: 100 MMOL/L — SIGNIFICANT CHANGE UP (ref 96–108)
CO2 SERPL-SCNC: 27 MMOL/L — SIGNIFICANT CHANGE UP (ref 22–29)
CREAT SERPL-MCNC: 0.55 MG/DL — SIGNIFICANT CHANGE UP (ref 0.5–1.3)
EGFR: 104 ML/MIN/1.73M2 — SIGNIFICANT CHANGE UP
EOSINOPHIL # BLD AUTO: 0.19 K/UL — SIGNIFICANT CHANGE UP (ref 0–0.5)
EOSINOPHIL NFR BLD AUTO: 3.5 % — SIGNIFICANT CHANGE UP (ref 0–6)
GLUCOSE SERPL-MCNC: 86 MG/DL — SIGNIFICANT CHANGE UP (ref 70–99)
HCT VFR BLD CALC: 46 % — HIGH (ref 34.5–45)
HGB BLD-MCNC: 15.1 G/DL — SIGNIFICANT CHANGE UP (ref 11.5–15.5)
IMM GRANULOCYTES NFR BLD AUTO: 0.7 % — SIGNIFICANT CHANGE UP (ref 0–0.9)
INR BLD: 1.08 RATIO — SIGNIFICANT CHANGE UP (ref 0.88–1.16)
LYMPHOCYTES # BLD AUTO: 1.51 K/UL — SIGNIFICANT CHANGE UP (ref 1–3.3)
LYMPHOCYTES # BLD AUTO: 28 % — SIGNIFICANT CHANGE UP (ref 13–44)
MCHC RBC-ENTMCNC: 28.4 PG — SIGNIFICANT CHANGE UP (ref 27–34)
MCHC RBC-ENTMCNC: 32.8 GM/DL — SIGNIFICANT CHANGE UP (ref 32–36)
MCV RBC AUTO: 86.6 FL — SIGNIFICANT CHANGE UP (ref 80–100)
MONOCYTES # BLD AUTO: 0.49 K/UL — SIGNIFICANT CHANGE UP (ref 0–0.9)
MONOCYTES NFR BLD AUTO: 9.1 % — SIGNIFICANT CHANGE UP (ref 2–14)
NEUTROPHILS # BLD AUTO: 3.12 K/UL — SIGNIFICANT CHANGE UP (ref 1.8–7.4)
NEUTROPHILS NFR BLD AUTO: 57.8 % — SIGNIFICANT CHANGE UP (ref 43–77)
NT-PROBNP SERPL-SCNC: 22 PG/ML — SIGNIFICANT CHANGE UP (ref 0–300)
PLATELET # BLD AUTO: 273 K/UL — SIGNIFICANT CHANGE UP (ref 150–400)
POTASSIUM SERPL-MCNC: 4.4 MMOL/L — SIGNIFICANT CHANGE UP (ref 3.5–5.3)
POTASSIUM SERPL-SCNC: 4.4 MMOL/L — SIGNIFICANT CHANGE UP (ref 3.5–5.3)
PROT SERPL-MCNC: 7.1 G/DL — SIGNIFICANT CHANGE UP (ref 6.6–8.7)
PROTHROM AB SERPL-ACNC: 12.5 SEC — SIGNIFICANT CHANGE UP (ref 10.5–13.4)
RBC # BLD: 5.31 M/UL — HIGH (ref 3.8–5.2)
RBC # FLD: 12.8 % — SIGNIFICANT CHANGE UP (ref 10.3–14.5)
SODIUM SERPL-SCNC: 138 MMOL/L — SIGNIFICANT CHANGE UP (ref 135–145)
TROPONIN T SERPL-MCNC: <0.01 NG/ML — SIGNIFICANT CHANGE UP (ref 0–0.06)
TROPONIN T SERPL-MCNC: <0.01 NG/ML — SIGNIFICANT CHANGE UP (ref 0–0.06)
WBC # BLD: 5.4 K/UL — SIGNIFICANT CHANGE UP (ref 3.8–10.5)
WBC # FLD AUTO: 5.4 K/UL — SIGNIFICANT CHANGE UP (ref 3.8–10.5)

## 2023-06-04 PROCEDURE — 93010 ELECTROCARDIOGRAM REPORT: CPT | Mod: 76

## 2023-06-04 PROCEDURE — 99285 EMERGENCY DEPT VISIT HI MDM: CPT | Mod: 25

## 2023-06-04 PROCEDURE — 83880 ASSAY OF NATRIURETIC PEPTIDE: CPT

## 2023-06-04 PROCEDURE — 93005 ELECTROCARDIOGRAM TRACING: CPT

## 2023-06-04 PROCEDURE — 99284 EMERGENCY DEPT VISIT MOD MDM: CPT

## 2023-06-04 PROCEDURE — 93970 EXTREMITY STUDY: CPT

## 2023-06-04 PROCEDURE — 71045 X-RAY EXAM CHEST 1 VIEW: CPT

## 2023-06-04 PROCEDURE — 96375 TX/PRO/DX INJ NEW DRUG ADDON: CPT

## 2023-06-04 PROCEDURE — 36415 COLL VENOUS BLD VENIPUNCTURE: CPT

## 2023-06-04 PROCEDURE — 96374 THER/PROPH/DIAG INJ IV PUSH: CPT

## 2023-06-04 PROCEDURE — 85379 FIBRIN DEGRADATION QUANT: CPT

## 2023-06-04 PROCEDURE — 71045 X-RAY EXAM CHEST 1 VIEW: CPT | Mod: 26

## 2023-06-04 PROCEDURE — 85025 COMPLETE CBC W/AUTO DIFF WBC: CPT

## 2023-06-04 PROCEDURE — 84484 ASSAY OF TROPONIN QUANT: CPT

## 2023-06-04 PROCEDURE — 80053 COMPREHEN METABOLIC PANEL: CPT

## 2023-06-04 PROCEDURE — 85730 THROMBOPLASTIN TIME PARTIAL: CPT

## 2023-06-04 PROCEDURE — 93970 EXTREMITY STUDY: CPT | Mod: 26

## 2023-06-04 PROCEDURE — 85610 PROTHROMBIN TIME: CPT

## 2023-06-04 RX ORDER — ONDANSETRON 8 MG/1
4 TABLET, FILM COATED ORAL ONCE
Refills: 0 | Status: COMPLETED | OUTPATIENT
Start: 2023-06-04 | End: 2023-06-04

## 2023-06-04 RX ORDER — FAMOTIDINE 10 MG/ML
20 INJECTION INTRAVENOUS ONCE
Refills: 0 | Status: COMPLETED | OUTPATIENT
Start: 2023-06-04 | End: 2023-06-04

## 2023-06-04 RX ADMIN — Medication 30 MILLILITER(S): at 20:35

## 2023-06-04 RX ADMIN — ONDANSETRON 4 MILLIGRAM(S): 8 TABLET, FILM COATED ORAL at 20:35

## 2023-06-04 RX ADMIN — FAMOTIDINE 20 MILLIGRAM(S): 10 INJECTION INTRAVENOUS at 20:35

## 2023-06-04 NOTE — ED PROVIDER NOTE - ATTENDING CONTRIBUTION TO CARE
Nathaly: I performed a face to face evaluation of this patient and performed a full history and physical examination on the patient.  I agree with the resident's history, physical examination, and plan of the patient unless otherwise noted. My brief assessment is as follows: hx MS, htn c/o several hours of acute midsternal constant chest discomfort with mild sob. states mild b/l swelling thinks L>R, no hx dvt/pe, no travel. no f/c. no cardiac hx. no other complaints. non toxic, ctab, nl rate/effort, rrr, abd benign, mild non pitting edema b/l ?L slightly greater than right, no erythema. neurovascularly intact. ekg without acute ischemic abnormality. labs, xr, trop x2, ultrasound LE. reassess

## 2023-06-04 NOTE — ED PROVIDER NOTE - PATIENT PORTAL LINK FT
You can access the FollowMyHealth Patient Portal offered by [facility] by registering at the following website: [email or web address]. By joining [employer]’s FollowMyHealth portal, you will also be able to view your health information using other applications (apps) compatible with our system.

## 2023-06-04 NOTE — ED PROVIDER NOTE - CLINICAL SUMMARY MEDICAL DECISION MAKING FREE TEXT BOX
patient with past medical history of MS and hyperlipidemia presents to ED with chest pain and shortness of breath.  EKG without acute ischemic changes.  Labs, chest x-ray, Zofran, Pepcid and Maalox ordered. patient with past medical history of MS and hyperlipidemia presents to ED with chest pain and shortness of breath.  EKG without acute ischemic changes.  Labs, chest x-ray, Zofran, Pepcid and Maalox ordered. Workup is unremarkable for any emergent process.   Patient is now feeling improved and wishes to leave.  Patient / family members have capacity.  Patient's information given to care coordinator to facilitate prompt cardiology follow up.  Patient/family was given full return precautions, counseled on red flag symptoms such as LOC, fever, severe pain, or focal deficits and advised to return to the ED for these reasons or any reason that was concerning to them. Patient/family was informed of all significant and incidental findings found on this workup today and all results were reviewed.   All questions were answered, advised to make close follow up with their primary care provider and specialty clinics (as applicable) to follow up with this visit and continue investigation/treatment.   Patient/family has shown adequate understanding and is agreeable to the plan.

## 2023-06-04 NOTE — ED ADULT NURSE REASSESSMENT NOTE - NS ED NURSE REASSESS COMMENT FT1
EKG had to be repeated due to lotion and poor conductivity. First EKG done at 1916 EKG had to be repeated due to lotion and poor conductivity. First EKG done at 1916 second EKG done at 1927

## 2023-06-04 NOTE — ED PROVIDER NOTE - CARE PROVIDER_API CALL
Shane Jacob  Interventional Cardiology  [address], [address]  [place] [address]  Phone: [phone]  Fax: [phone]  Follow Up Time:

## 2023-06-04 NOTE — ED PROVIDER NOTE - OBJECTIVE STATEMENT
62-year-old female patient with past medical history of MS and hyperlipidemia presents to ED with chest pain today.  Patient describes the pain as midsternal and burning.  She also reports some associated shortness of breath.  She does note a little bit of leg swelling recently.  She is also nauseous.  She denies fever, chills, vomiting, cough, back pain, numbness, tingling, focal weakness, or any other complaints.

## 2023-06-04 NOTE — ED ADULT NURSE NOTE - OBJECTIVE STATEMENT
Pt presents to ED c/o chest pain. Pt reports pain came on suddenly today and is associated with nausea and SOB. Pt states she has been noticing swelling in both of her legs recently. PMH MS and HLD. Cardiac monitor in place in normal sinus rhythm. SPO2 96% on room air. Pt awaiting chest xray and ultrasound at this time. Pt and family at bedside educated on plan of care and expresses understanding.

## 2023-06-04 NOTE — ED ADULT NURSE NOTE - NSFALLUNIVINTERV_ED_ALL_ED
Bed/Stretcher in lowest position, wheels locked, appropriate side rails in place/Call bell, personal items and telephone in reach/Instruct patient to call for assistance before getting out of bed/chair/stretcher/Non-slip footwear applied when patient is off stretcher/[place] to call system/Physically safe environment - no spills, clutter or unnecessary equipment/Purposeful proactive rounding/Room/bathroom lighting operational, light cord in reach

## 2023-06-04 NOTE — ED ADULT NURSE NOTE - ED CARDIAC RATE
normal
Render In Strict Bullet Format?: No
Detail Level: Zone
Initiate Treatment: .\\nAM\\n-Apply Rhofade in the morning. \\n-Apply Double Repair Moisturizer by La Roche Posay\\nPM\\n-CeraVe resurfacing retinol or SkinBetter AlphaRet\\n-Apply Double Repair Moisturizer by La Roche Posay.

## 2023-06-04 NOTE — ED PROVIDER NOTE - PROGRESS NOTE DETAILS
Pavel: Ambulating independently in ED. Tolerating PO. Stable for dc. Patient understands return precautions and f/u.

## 2023-06-04 NOTE — ED PROVIDER NOTE - PHYSICAL EXAMINATION
Gen: Well appearing in NAD  Head: NC/AT  Neck: trachea midline  Card: regular rate and rhythm, 1+ pitting edema bilateral lower extremities  Resp:   crackles to the right base  Abd: soft, non-distended, non-tender  Ext: no deformities above reported baseline  Neuro:  A&O, no motor or sensory deficits above reported baseline  Skin:  Warm and dry as visualized  Psych:  Normal affect and mood Gen: Well appearing in NAD  Head: NC/AT  Neck: trachea midline  Card: regular rate and rhythm, 1+ pitting edema bilateral lower extremities  Resp: CTAB  Abd: soft, non-distended, non-tender  Ext: no deformities above reported baseline  Neuro:  A&O, no motor or sensory deficits above reported baseline  Skin:  Warm and dry as visualized  Psych:  Normal affect and mood

## 2023-06-04 NOTE — ED ADULT TRIAGE NOTE - CHIEF COMPLAINT QUOTE
" I have pain in my chest suddenly while I was home" pt denies any radiating, hx of MS and high cholesterol. pt describe pain as pressure constant since it satrted

## 2023-06-04 NOTE — ED ADULT TRIAGE NOTE - BSA (M2)
Department of Anesthesiology  Preprocedure Note       Name:  Tiffany Rolon   Age:  68 y.o.  :  1945                                          MRN:  [id number]         Date:  3/29/2023      Surgeon: Nilo Barrera):  Kiel Montejo On Call to [address] [place] [address], MD        lactated ringers IV 1.91

## 2023-11-10 NOTE — ED CDU PROVIDER SUBSEQUENT DAY NOTE - TEMPLATE, MLM
PEDIATRIC RESIDENT FLOOR ADMISSION NOTE    History obtained from: Mother  Live video/phone  service used? No    History Of Present Illness:    Hesham is a 13 month old male presenting with increased work of breathing and congestion for five days. Parents noted patient would audibly wheeze, especially after activity, and had an increased respiration rate along with belly breathing. Parents started giving Albuterol 4 puffs every 4 hours during the day three days which helped with symptoms. Symptoms acutely worsened yesterday, so patient brought to ED for further evaluation. No change in appetite, activity, or urine output. Denies nausea, vomiting, or diarrhea. Patient is up to date on vaccinations and in .     ROS:  Review of Systems  Complete review of systems reviewed, pertinent findings noted above, remaining review of systems otherwise negative.    ED Course:  Vitals on presentation: 100 F, 170 bpm, 58 RR, 137/82 mmHg, 91% SpO2. Patient presented with moderate respiratory distress. Quad screen positive for RSV. Initial labs as follows:  BMP: Na 135, K 5.6 (hemolyzed), bicarb 21, Glucose 100  CBC: WBC 10.8, Hgb 11.3, Plt 447  CXR showed focal pulmonary parenchymal opacities which could be atelectasis on viral bronchiolitis, cannot exclude pneumonia.  Given Albuterol 10 mg x1, Decadron 0.6 mg/kg, Atrovent 1 mg.    Past Medical History  Mild persistent asthma  3 hospitalizations this year (two for bronchiolitis, one for asthma exacerbation)  No PICU stays      Surgical History None   has no past surgical history on file.     Social History  Lives at home with Parents  Recent travel: No  Known sick contacts: Yes    Family History  Noncontributory    Allergies  ALLERGIES:  Patient has no known allergies.    Home Medications  Flovent 110 mcg 2 puffs BID    Immunizations    There is no immunization history on file for this patient.  Up to date Yes, Flu vaccination Yes, COVID vaccination  Yes    PCP:  Rei Haywood MD      Physical Exam  Temp:  [97.1 °F (36.2 °C)-100.1 °F (37.8 °C)] 97.1 °F (36.2 °C)  Heart Rate:  [120-182] 125  Resp:  [26-58] 48  BP: (107-137)/(63-96) 131/96  FiO2 (%):  [21 %] 21 %   No intake or output data in the 24 hours ending 11/10/23 0253      Physical Exam  Vitals reviewed.   Constitutional:       General: He is not in acute distress.     Appearance: He is well-developed. He is not toxic-appearing.   HENT:      Head: Normocephalic and atraumatic.      Right Ear: External ear normal.      Left Ear: External ear normal.      Nose: Congestion present.      Mouth/Throat:      Mouth: Mucous membranes are moist.   Eyes:      Extraocular Movements: Extraocular movements intact.      Pupils: Pupils are equal, round, and reactive to light.   Cardiovascular:      Rate and Rhythm: Normal rate and regular rhythm.      Pulses: Normal pulses.      Heart sounds: Normal heart sounds.   Pulmonary:      Effort: Pulmonary effort is normal. No respiratory distress.      Breath sounds: No decreased air movement. No wheezing.   Abdominal:      General: Abdomen is flat.      Palpations: Abdomen is soft.      Tenderness: There is no abdominal tenderness.   Skin:     General: Skin is warm.      Capillary Refill: Capillary refill takes less than 2 seconds.            LABORATORY DATA:    Admission on 11/09/2023   Component Date Value Ref Range Status    Rapid SARS-COV-2 by PCR 11/09/2023 Not Detected  Not Detected / Detected / Presumptive Positive / Inhibitors present Final    Influenza A by PCR 11/09/2023 Not Detected  Not Detected Final    Influenza B by PCR 11/09/2023 Not Detected  Not Detected Final    RSV BY PCR 11/09/2023 Detected (A)  Not Detected Final    Isolation Guidelines 11/09/2023    Final    Do not use this test result as the sole decision-maker for discontinuation of isolation.   Clinical evaluation should be considered for other respiratory illness requiring transmission-based  isolation.    -    No fever (<99.0 F/37.2 C) for at least 24 hours without the use of fever-reducing medications    AND  -    Respiratory symptoms have improved or resolved (e.g. cough, shortness of breath)     AND  -    COVID-19 negative test    See COVID-19 Deisolation Resource Guide    Procedural Comment 11/09/2023    Final    SARS-COV-2 nucleic acid has not been detected indicating the absence of COVID-19.    This test was performed using the [employer] Xpert Xpress SARS-CoV-2/Flu/RSV RT-PCR test that has been given Emergency Use Authorization (EUA) by the [place] Food and Drug Administration (FDA). These tests are considered definitive and do not need to be confirmed by another method.    Sodium 11/09/2023 135  135 - 145 mmol/L Final    Potassium 11/09/2023 5.6 (H)  3.4 - 5.1 mmol/L Final    Slight to moderate hemolysis, result may be falsely increased.    Chloride 11/09/2023 107  97 - 110 mmol/L Final    Carbon Dioxide 11/09/2023 21  21 - 32 mmol/L Final    Anion Gap 11/09/2023 13  7 - 19 mmol/L Final    Glucose 11/09/2023 100 (H)  70 - 99 mg/dL Final    BUN 11/09/2023 16  5 - 18 mg/dL Final    Creatinine 11/09/2023 0.28  0.16 - 0.59 mg/dL Final    Glomerular Filtration Rate 11/09/2023    Final    GFR not calculated for age less than 18 years    BUN/Cr 11/09/2023 57 (H)  7 - 25 Final    Calcium 11/09/2023 10.3  8.0 - 11.0 mg/dL Final    WBC 11/09/2023 10.8  5.0 - 19.5 K/mcL Final    RBC 11/09/2023 4.51 (H)  3.10 - 4.50 mil/mcL Final    HGB 11/09/2023 11.3  10.5 - 13.5 g/dL Final    HCT 11/09/2023 35.3  29.0 - 41.0 % Final    MCV 11/09/2023 78.3  70.0 - 86.0 fl Final    MCH 11/09/2023 25.1  23.0 - 31.0 pg Final    MCHC 11/09/2023 32.0  30.0 - 36.0 g/dL Final    RDW-CV 11/09/2023 16.0 (H)  11.0 - 15.0 % Final    RDW-SD 11/09/2023 45.8  35.0 - 47.0 fL Final    PLT 11/09/2023 447  140 - 450 K/mcL Final    NRBC 11/09/2023 0  <=0 /100 WBC Final    Neutrophil, Percent 11/09/2023 72  % Final    Lymphocytes, Percent  11/09/2023 22  % Final    Mono, Percent 11/09/2023 6  % Final    Eosinophils, Percent 11/09/2023 0  % Final    Basophils, Percent 11/09/2023 0  % Final    Immature Granulocytes 11/09/2023 0  % Final    Absolute Neutrophils 11/09/2023 7.7  1.5 - 8.5 K/mcL Final    Absolute Lymphocytes 11/09/2023 2.4 (L)  4.0 - 10.5 K/mcL Final    Absolute Monocytes 11/09/2023 0.6  0.0 - 0.8 K/mcL Final    Absolute Eosinophils  11/09/2023 0.0  0.0 - 0.7 K/mcL Final    Absolute Basophils 11/09/2023 0.0  0.0 - 0.2 K/mcL Final    Absolute Immature Granulocytes 11/09/2023 0.0  0.0 - 0.2 K/mcL Final         IMAGING STUDIES:    XR CHEST PA AND LATERAL 2 VIEWS   Final Result      Focal pulmonary parenchymal opacities which may represent atelectasis on background of viral bronchiolitis however multifocal pneumonia is not excluded.      This document is electronically signed by Bryce Weber ([facility] pediatric radiologist), on 11/09/2023 22:51 PM CST.               ASSESSMENT:  Hesham Key is a 13 month old male with mild persistent asthma presented with increased work of breathing, and admitted for RSV bronchiolitis, acute hypoxic respiratory failure and concomitant acute asthma exacerbation, now on day 5 of symptoms. Patient with minimal retractions, in no acute respiratory distress, and saturating well on HFNC 1L/kg 21%.    Principal Problem:    RSV bronchiolitis    PLAN:  Neuro/Pain:  - Tylenol 15 mg/kg q6h prn    Resp:  - HFNC 1L/kg FiO2 21%, wean as tolerated  - Albuterol 2.5 mg q2h, wean as tolerated  - Orapred 1 mg/kg daily (s 11/9)  - Resume home Flovent 110 mcg 2 puffs BID  - continuous pulse oximetry    S/p Decadron 0.6 mg/kg in ED    CV:  - HDS    FEN/GI:  - GPD    ID:  - +RSV  - isolation per protocol  Antibiotic Decision Making  Patient on Antibiotics: - No    VTE: 0 (None), at baseline mobility, too small for SCDs  Lines: PIV. Function, utilization, and necessity addressed/discussed on rounds    DISPO: will be ready for  discharge when medically stable.    We discussed the current management plan with patient and family, who stated understating of, and agreement with, current plan. All of their questions and concerns were addressed.    RN present on rounds/updated on plan of care.    Note to Caregivers:  The 21st Century Cures Act makes medical notes available to patients in the interest of transparency.  However, please be advised that this is a medical document.  It is intended as [id number] communication.  It is written in medical language and may contain abbreviations or verbiage that are technical and unfamiliar.  It may appear blunt or direct.  Medical documents are intended to carry relevant information, facts as evident, and the clinical opinion of the practitioner.      Bipin Kiran MD     11/10/2023  2:53 AM   Cardiac

## 2023-11-19 ENCOUNTER — INPATIENT (INPATIENT)
Facility: HOSPITAL | Age: 63
LOS: 13 days | Discharge: HOME | End: 2023-12-03
Attending: HOSPITALIST | Admitting: HOSPITALIST
Payer: MEDICAID

## 2023-11-19 VITALS
HEART RATE: 96 BPM | WEIGHT: 169.98 LBS | SYSTOLIC BLOOD PRESSURE: 119 MMHG | TEMPERATURE: 98 F | HEIGHT: 63 IN | OXYGEN SATURATION: 96 % | DIASTOLIC BLOOD PRESSURE: 81 MMHG | RESPIRATION RATE: 17 BRPM

## 2023-11-19 DIAGNOSIS — J10.1 INFLUENZA DUE TO OTHER IDENTIFIED INFLUENZA VIRUS WITH OTHER RESPIRATORY MANIFESTATIONS: ICD-10-CM

## 2023-11-19 DIAGNOSIS — Z98.891 HISTORY OF UTERINE SCAR FROM PREVIOUS SURGERY: Chronic | ICD-10-CM

## 2023-11-19 DIAGNOSIS — G35 MULTIPLE SCLEROSIS: ICD-10-CM

## 2023-11-19 DIAGNOSIS — R53.81 OTHER MALAISE: ICD-10-CM

## 2023-11-19 LAB
ALBUMIN SERPL ELPH-MCNC: 3.7 G/DL — SIGNIFICANT CHANGE UP (ref 3.3–5)
ALBUMIN SERPL ELPH-MCNC: 3.7 G/DL — SIGNIFICANT CHANGE UP (ref 3.3–5)
ALP SERPL-CCNC: 109 U/L — SIGNIFICANT CHANGE UP (ref 40–120)
ALP SERPL-CCNC: 109 U/L — SIGNIFICANT CHANGE UP (ref 40–120)
ALT FLD-CCNC: 59 U/L — SIGNIFICANT CHANGE UP (ref 12–78)
ALT FLD-CCNC: 59 U/L — SIGNIFICANT CHANGE UP (ref 12–78)
ANION GAP SERPL CALC-SCNC: 6 MMOL/L — SIGNIFICANT CHANGE UP (ref 5–17)
ANION GAP SERPL CALC-SCNC: 6 MMOL/L — SIGNIFICANT CHANGE UP (ref 5–17)
APPEARANCE UR: ABNORMAL
APPEARANCE UR: ABNORMAL
AST SERPL-CCNC: 53 U/L — HIGH (ref 15–37)
AST SERPL-CCNC: 53 U/L — HIGH (ref 15–37)
BACTERIA # UR AUTO: ABNORMAL /HPF
BACTERIA # UR AUTO: ABNORMAL /HPF
BASOPHILS # BLD AUTO: 0.01 K/UL — SIGNIFICANT CHANGE UP (ref 0–0.2)
BASOPHILS # BLD AUTO: 0.01 K/UL — SIGNIFICANT CHANGE UP (ref 0–0.2)
BASOPHILS NFR BLD AUTO: 0.3 % — SIGNIFICANT CHANGE UP (ref 0–2)
BASOPHILS NFR BLD AUTO: 0.3 % — SIGNIFICANT CHANGE UP (ref 0–2)
BILIRUB SERPL-MCNC: 0.9 MG/DL — SIGNIFICANT CHANGE UP (ref 0.2–1.2)
BILIRUB SERPL-MCNC: 0.9 MG/DL — SIGNIFICANT CHANGE UP (ref 0.2–1.2)
BILIRUB UR-MCNC: NEGATIVE — SIGNIFICANT CHANGE UP
BILIRUB UR-MCNC: NEGATIVE — SIGNIFICANT CHANGE UP
BUN SERPL-MCNC: 18 MG/DL — SIGNIFICANT CHANGE UP (ref 7–23)
BUN SERPL-MCNC: 18 MG/DL — SIGNIFICANT CHANGE UP (ref 7–23)
CALCIUM SERPL-MCNC: 9.1 MG/DL — SIGNIFICANT CHANGE UP (ref 8.5–10.1)
CALCIUM SERPL-MCNC: 9.1 MG/DL — SIGNIFICANT CHANGE UP (ref 8.5–10.1)
CHLORIDE SERPL-SCNC: 107 MMOL/L — SIGNIFICANT CHANGE UP (ref 96–108)
CHLORIDE SERPL-SCNC: 107 MMOL/L — SIGNIFICANT CHANGE UP (ref 96–108)
CO2 SERPL-SCNC: 25 MMOL/L — SIGNIFICANT CHANGE UP (ref 22–31)
CO2 SERPL-SCNC: 25 MMOL/L — SIGNIFICANT CHANGE UP (ref 22–31)
COLOR SPEC: SIGNIFICANT CHANGE UP
COLOR SPEC: SIGNIFICANT CHANGE UP
CREAT SERPL-MCNC: 0.76 MG/DL — SIGNIFICANT CHANGE UP (ref 0.5–1.3)
CREAT SERPL-MCNC: 0.76 MG/DL — SIGNIFICANT CHANGE UP (ref 0.5–1.3)
DIFF PNL FLD: NEGATIVE — SIGNIFICANT CHANGE UP
DIFF PNL FLD: NEGATIVE — SIGNIFICANT CHANGE UP
EGFR: 88 ML/MIN/1.73M2 — SIGNIFICANT CHANGE UP
EGFR: 88 ML/MIN/1.73M2 — SIGNIFICANT CHANGE UP
EOSINOPHIL # BLD AUTO: 0 K/UL — SIGNIFICANT CHANGE UP (ref 0–0.5)
EOSINOPHIL # BLD AUTO: 0 K/UL — SIGNIFICANT CHANGE UP (ref 0–0.5)
EOSINOPHIL NFR BLD AUTO: 0 % — SIGNIFICANT CHANGE UP (ref 0–6)
EOSINOPHIL NFR BLD AUTO: 0 % — SIGNIFICANT CHANGE UP (ref 0–6)
EPI CELLS # UR: SIGNIFICANT CHANGE UP
EPI CELLS # UR: SIGNIFICANT CHANGE UP
FLUAV H3 RNA SPEC QL NAA+PROBE: DETECTED
FLUAV H3 RNA SPEC QL NAA+PROBE: DETECTED
GLUCOSE SERPL-MCNC: 102 MG/DL — HIGH (ref 70–99)
GLUCOSE SERPL-MCNC: 102 MG/DL — HIGH (ref 70–99)
GLUCOSE UR QL: NEGATIVE MG/DL — SIGNIFICANT CHANGE UP
GLUCOSE UR QL: NEGATIVE MG/DL — SIGNIFICANT CHANGE UP
HCT VFR BLD CALC: 48.1 % — HIGH (ref 34.5–45)
HCT VFR BLD CALC: 48.1 % — HIGH (ref 34.5–45)
HGB BLD-MCNC: 15.8 G/DL — HIGH (ref 11.5–15.5)
HGB BLD-MCNC: 15.8 G/DL — HIGH (ref 11.5–15.5)
IMM GRANULOCYTES NFR BLD AUTO: 0.8 % — SIGNIFICANT CHANGE UP (ref 0–0.9)
IMM GRANULOCYTES NFR BLD AUTO: 0.8 % — SIGNIFICANT CHANGE UP (ref 0–0.9)
KETONES UR-MCNC: 80 MG/DL
KETONES UR-MCNC: 80 MG/DL
LEUKOCYTE ESTERASE UR-ACNC: NEGATIVE — SIGNIFICANT CHANGE UP
LEUKOCYTE ESTERASE UR-ACNC: NEGATIVE — SIGNIFICANT CHANGE UP
LYMPHOCYTES # BLD AUTO: 0.52 K/UL — LOW (ref 1–3.3)
LYMPHOCYTES # BLD AUTO: 0.52 K/UL — LOW (ref 1–3.3)
LYMPHOCYTES # BLD AUTO: 13.1 % — SIGNIFICANT CHANGE UP (ref 13–44)
LYMPHOCYTES # BLD AUTO: 13.1 % — SIGNIFICANT CHANGE UP (ref 13–44)
MCHC RBC-ENTMCNC: 28.1 PG — SIGNIFICANT CHANGE UP (ref 27–34)
MCHC RBC-ENTMCNC: 28.1 PG — SIGNIFICANT CHANGE UP (ref 27–34)
MCHC RBC-ENTMCNC: 32.8 G/DL — SIGNIFICANT CHANGE UP (ref 32–36)
MCHC RBC-ENTMCNC: 32.8 G/DL — SIGNIFICANT CHANGE UP (ref 32–36)
MCV RBC AUTO: 85.4 FL — SIGNIFICANT CHANGE UP (ref 80–100)
MCV RBC AUTO: 85.4 FL — SIGNIFICANT CHANGE UP (ref 80–100)
MONOCYTES # BLD AUTO: 0.49 K/UL — SIGNIFICANT CHANGE UP (ref 0–0.9)
MONOCYTES # BLD AUTO: 0.49 K/UL — SIGNIFICANT CHANGE UP (ref 0–0.9)
MONOCYTES NFR BLD AUTO: 12.3 % — SIGNIFICANT CHANGE UP (ref 2–14)
MONOCYTES NFR BLD AUTO: 12.3 % — SIGNIFICANT CHANGE UP (ref 2–14)
NEUTROPHILS # BLD AUTO: 2.93 K/UL — SIGNIFICANT CHANGE UP (ref 1.8–7.4)
NEUTROPHILS # BLD AUTO: 2.93 K/UL — SIGNIFICANT CHANGE UP (ref 1.8–7.4)
NEUTROPHILS NFR BLD AUTO: 73.5 % — SIGNIFICANT CHANGE UP (ref 43–77)
NEUTROPHILS NFR BLD AUTO: 73.5 % — SIGNIFICANT CHANGE UP (ref 43–77)
NITRITE UR-MCNC: NEGATIVE — SIGNIFICANT CHANGE UP
NITRITE UR-MCNC: NEGATIVE — SIGNIFICANT CHANGE UP
NRBC # BLD: 0 /100 WBCS — SIGNIFICANT CHANGE UP (ref 0–0)
NRBC # BLD: 0 /100 WBCS — SIGNIFICANT CHANGE UP (ref 0–0)
PH UR: 5.5 — SIGNIFICANT CHANGE UP (ref 5–8)
PH UR: 5.5 — SIGNIFICANT CHANGE UP (ref 5–8)
PLATELET # BLD AUTO: 206 K/UL — SIGNIFICANT CHANGE UP (ref 150–400)
PLATELET # BLD AUTO: 206 K/UL — SIGNIFICANT CHANGE UP (ref 150–400)
POTASSIUM SERPL-MCNC: 4.7 MMOL/L — SIGNIFICANT CHANGE UP (ref 3.5–5.3)
POTASSIUM SERPL-MCNC: 4.7 MMOL/L — SIGNIFICANT CHANGE UP (ref 3.5–5.3)
POTASSIUM SERPL-SCNC: 4.7 MMOL/L — SIGNIFICANT CHANGE UP (ref 3.5–5.3)
POTASSIUM SERPL-SCNC: 4.7 MMOL/L — SIGNIFICANT CHANGE UP (ref 3.5–5.3)
PROT SERPL-MCNC: 8 GM/DL — SIGNIFICANT CHANGE UP (ref 6–8.3)
PROT SERPL-MCNC: 8 GM/DL — SIGNIFICANT CHANGE UP (ref 6–8.3)
PROT UR-MCNC: 30 MG/DL
PROT UR-MCNC: 30 MG/DL
RAPID RVP RESULT: DETECTED
RAPID RVP RESULT: DETECTED
RBC # BLD: 5.63 M/UL — HIGH (ref 3.8–5.2)
RBC # BLD: 5.63 M/UL — HIGH (ref 3.8–5.2)
RBC # FLD: 13.3 % — SIGNIFICANT CHANGE UP (ref 10.3–14.5)
RBC # FLD: 13.3 % — SIGNIFICANT CHANGE UP (ref 10.3–14.5)
RBC CASTS # UR COMP ASSIST: NEGATIVE /HPF — SIGNIFICANT CHANGE UP (ref 0–4)
RBC CASTS # UR COMP ASSIST: NEGATIVE /HPF — SIGNIFICANT CHANGE UP (ref 0–4)
SARS-COV-2 RNA SPEC QL NAA+PROBE: SIGNIFICANT CHANGE UP
SARS-COV-2 RNA SPEC QL NAA+PROBE: SIGNIFICANT CHANGE UP
SODIUM SERPL-SCNC: 138 MMOL/L — SIGNIFICANT CHANGE UP (ref 135–145)
SODIUM SERPL-SCNC: 138 MMOL/L — SIGNIFICANT CHANGE UP (ref 135–145)
SP GR SPEC: 1.03 — SIGNIFICANT CHANGE UP (ref 1–1.03)
SP GR SPEC: 1.03 — SIGNIFICANT CHANGE UP (ref 1–1.03)
UROBILINOGEN FLD QL: 1 MG/DL — SIGNIFICANT CHANGE UP (ref 0.2–1)
UROBILINOGEN FLD QL: 1 MG/DL — SIGNIFICANT CHANGE UP (ref 0.2–1)
WBC # BLD: 3.98 K/UL — SIGNIFICANT CHANGE UP (ref 3.8–10.5)
WBC # BLD: 3.98 K/UL — SIGNIFICANT CHANGE UP (ref 3.8–10.5)
WBC # FLD AUTO: 3.98 K/UL — SIGNIFICANT CHANGE UP (ref 3.8–10.5)
WBC # FLD AUTO: 3.98 K/UL — SIGNIFICANT CHANGE UP (ref 3.8–10.5)
WBC UR QL: SIGNIFICANT CHANGE UP /HPF (ref 0–5)
WBC UR QL: SIGNIFICANT CHANGE UP /HPF (ref 0–5)

## 2023-11-19 PROCEDURE — 99221 1ST HOSP IP/OBS SF/LOW 40: CPT

## 2023-11-19 PROCEDURE — 99285 EMERGENCY DEPT VISIT HI MDM: CPT

## 2023-11-19 PROCEDURE — 71045 X-RAY EXAM CHEST 1 VIEW: CPT | Mod: 26

## 2023-11-19 RX ORDER — LANOLIN ALCOHOL/MO/W.PET/CERES
3 CREAM (GRAM) TOPICAL AT BEDTIME
Refills: 0 | Status: DISCONTINUED | OUTPATIENT
Start: 2023-11-19 | End: 2023-12-03

## 2023-11-19 RX ORDER — ACETAMINOPHEN 500 MG
650 TABLET ORAL EVERY 6 HOURS
Refills: 0 | Status: DISCONTINUED | OUTPATIENT
Start: 2023-11-19 | End: 2023-12-03

## 2023-11-19 RX ORDER — INFLUENZA VIRUS VACCINE 15; 15; 15; 15 UG/.5ML; UG/.5ML; UG/.5ML; UG/.5ML
0.5 SUSPENSION INTRAMUSCULAR ONCE
Refills: 0 | Status: DISCONTINUED | OUTPATIENT
Start: 2023-11-19 | End: 2023-12-03

## 2023-11-19 RX ORDER — ONDANSETRON 8 MG/1
4 TABLET, FILM COATED ORAL EVERY 8 HOURS
Refills: 0 | Status: DISCONTINUED | OUTPATIENT
Start: 2023-11-19 | End: 2023-12-03

## 2023-11-19 RX ORDER — GABAPENTIN 400 MG/1
300 CAPSULE ORAL THREE TIMES A DAY
Refills: 0 | Status: DISCONTINUED | OUTPATIENT
Start: 2023-11-19 | End: 2023-12-03

## 2023-11-19 RX ADMIN — GABAPENTIN 300 MILLIGRAM(S): 400 CAPSULE ORAL at 22:20

## 2023-11-19 RX ADMIN — Medication 100 MILLIGRAM(S): at 22:20

## 2023-11-19 NOTE — ED ADULT TRIAGE NOTE - PRO INTERPRETER NEED 2
Render Risk Assessment In Note?: yes
Detail Level: Zone
Additional Notes: Father with ocular MM
English

## 2023-11-19 NOTE — ED ADULT TRIAGE NOTE - CHIEF COMPLAINT QUOTE
BIBA,  pt c/o bilateral LE weakness x 2 days.  (PMH-MS),  pt also c/o L-arm pain x 1 week.  pt uses WC at home. BIBA,  pt c/o bilateral LE weakness x 2 days.  (PMH-MS),  pt also c/o L-arm pain x 1 week.  pt uses WC at home. home aide with pt

## 2023-11-19 NOTE — ED PROVIDER NOTE - OBJECTIVE STATEMENT
Patient is a 63-year-old lady with a past medical history of MS, paroxysmal A-fib who presents the ED because of weakness.  She has been unable to get out of bed, and had 2 episodes when she had her legs give out on her over the past 2 days.  Has been having flulike symptoms including sore throat, cough, which is nonproductive.  Has been having some chills.  No chest pain, no shortness of breath, no abdominal pain.  Normally able to ambulate with a cane.

## 2023-11-19 NOTE — H&P ADULT - NSHPPHYSICALEXAM_GEN_ALL_CORE
T(C): 36.6 (11-19-23 @ 15:30), Max: 36.6 (11-19-23 @ 11:50)  HR: 93 (11-19-23 @ 15:30) (93 - 96)  BP: 113/75 (11-19-23 @ 15:30) (113/75 - 119/81)  RR: 17 (11-19-23 @ 15:30) (17 - 17)  SpO2: 98% (11-19-23 @ 15:30) (96% - 98%)    CONSTITUTIONAL: Well groomed, no apparent distress  EYES: PERRLA and symmetric, EOMI  ENMT: Oral mucosa with moist membranes  RESP: No respiratory distress, no use of accessory muscles; CTA b/l  CV: RRR  GI: Soft, NT, ND

## 2023-11-19 NOTE — PATIENT PROFILE ADULT - FALL HARM RISK - RISK INTERVENTIONS
Assistance OOB with selected safe patient handling equipment/Assistance with ambulation/Communicate Fall Risk and Risk Factors to all staff, patient, and family/Discuss with provider need for PT consult/Monitor gait and stability/Reinforce activity limits and safety measures with patient and family/Visual Cue: Yellow wristband/Bed in lowest position, wheels locked, appropriate side rails in place/Call bell, personal items and telephone in reach/Instruct patient to call for assistance before getting out of bed or chair/Non-slip footwear when patient is out of bed/[place] to call system/Physically safe environment - no spills, clutter or unnecessary equipment/Purposeful Proactive Rounding/Room/bathroom lighting operational, light cord in reach

## 2023-11-19 NOTE — ED ADULT NURSE NOTE - CHIEF COMPLAINT QUOTE
BIBA,  pt c/o bilateral LE weakness x 2 days.  (PMH-MS),  pt also c/o L-arm pain x 1 week.  pt uses WC at home. home aide with pt

## 2023-11-19 NOTE — H&P ADULT - ASSESSMENT
Physical deconditioning, suspect secondary to viral illness  Complaints of inability to ambulate ***  CXR unremarkable, influenza +  Tamiflu***?  F/u U/A  PT/OT ordered      Chronic medical conditions:  MS:   Paroxysmal atrial fibrillation: *** Tomasa Hooks is a 63 year old female with PMHx of multiple sclerosis who presented to the ED on 11/19/23 for complaints of generalized weakness and admitted for physical deconditioning    Physical deconditioning, suspect secondary to viral illness  In pt with multiple sclerosis  Complaints of bilateral leg weakness which has been persistent x 4 days  CXR unremarkable, influenza +  Unvaccinated against influenza, declined tamiflu  F/u U/A to r/o infectious etiology  PT/OT ordered      Chronic medical conditions:  Multiple sclerosis: receives ocrelizumab infusion every six months, last dose was almost six months ago, follows outpatient with neurology    Plan of care discussed with caregiver at bedside. Tomasa Hooks is a 63 year old female with PMHx of multiple sclerosis who presented to the ED on 11/19/23 for complaints of generalized weakness and admitted for physical deconditioning    Physical deconditioning, suspect secondary to viral illness  In pt with multiple sclerosis  Complaints of bilateral leg weakness which has been persistent x 4 days  CXR unremarkable, U/A unremarkable, influenza +  Unvaccinated against influenza, declined tamiflu  PT/OT ordered      Chronic medical conditions:  Multiple sclerosis: receives ocrelizumab infusion every six months, last dose was almost six months ago, follows outpatient with neurology    Plan of care discussed with caregiver at bedside.

## 2023-11-19 NOTE — H&P ADULT - NSHPLABSRESULTS_GEN_ALL_CORE
15.8   3.98  )-----------( 206      ( 19 Nov 2023 13:20 )             48.1   11-19    138  |  107  |  18  ----------------------------<  102<H>  4.7   |  25  |  0.76    Ca    9.1      19 Nov 2023 13:20    TPro  8.0  /  Alb  3.7  /  TBili  0.9  /  DBili  x   /  AST  53<H>  /  ALT  59  /  AlkPhos  109  11-19 15.8   3.98  )-----------( 206      ( 2023 13:20 )             48.1       138  |  107  |  18  ----------------------------<  102<H>  4.7   |  25  |  0.76    Ca    9.1      2023 13:20    TPro  8.0  /  Alb  3.7  /  TBili  0.9  /  DBili  x   /  AST  53<H>  /  ALT  59  /  AlkPhos  109      Urinalysis Basic - ( 2023 18:07 )    Color: Dark Yellow / Appearance: Cloudy / S.027 / pH: x  Gluc: x / Ketone: 80 mg/dL  / Bili: Negative / Urobili: 1.0 mg/dL   Blood: x / Protein: 30 mg/dL / Nitrite: Negative   Leuk Esterase: Negative / RBC: x / WBC x   Sq Epi: x / Non Sq Epi: x / Bacteria: x

## 2023-11-19 NOTE — ED ADULT NURSE NOTE - NSFALLRISKINTERV_ED_ALL_ED

## 2023-11-19 NOTE — ED ADULT NURSE NOTE - FINAL NURSING ELECTRONIC SIGNATURE
Procedure- Orthopedics   Earlyne Theo 80 y o  female MRN: [id number]  Unit/Bed#: [facility] 489-16    Procedure: right knee aspiration and injection    After sterile preparation of the skin overlying the knee local anesthetic was provided with 5cc of 1% lidocaine with epi  An 18 gauge needle was then  inserted via a superior lateral portal   Approx 8cc of bloody fluid was aspirated  The syringe was then exchanged and a mixture of 2cc 1% lidocaine with epi, 2cc 0 25% Marcaine, 2cc Betamethasone was injected into the knee joint  Sterile dressing was then applied  Pt tolerated the procedure well and was neurovascularly intact both pre and post procedure      Kaylie Galicia MD 19-Nov-2023 18:36

## 2023-11-19 NOTE — ED PROVIDER NOTE - CLINICAL SUMMARY MEDICAL DECISION MAKING FREE TEXT BOX
Ddx: Viral uri/ flu/ deconditioning/ no focal neuro deficits  Plan: Cbc, cmp, ua, viral resp swab, likely admit

## 2023-11-19 NOTE — ED ADULT NURSE NOTE - OBJECTIVE STATEMENT
63 year old female A/Ox4 c/o bilateral leg pain starting 3 days ago, pt c/o weakness due to the flu, pt reports she ambulates with a walker at home but due to weakness pt has been unable to bear weight, pt reports she had a fall yesterday coming out of the bathroom, pt denies hitting head, loc, taking blood thinners, pt c/o numbness and tingling, headache. HX: MS

## 2023-11-20 LAB
HCV AB S/CO SERPL IA: 0.03 S/CO — SIGNIFICANT CHANGE UP (ref 0–0.99)
HCV AB S/CO SERPL IA: 0.03 S/CO — SIGNIFICANT CHANGE UP (ref 0–0.99)
HCV AB SERPL-IMP: SIGNIFICANT CHANGE UP
HCV AB SERPL-IMP: SIGNIFICANT CHANGE UP

## 2023-11-20 PROCEDURE — 99232 SBSQ HOSP IP/OBS MODERATE 35: CPT

## 2023-11-20 RX ADMIN — GABAPENTIN 300 MILLIGRAM(S): 400 CAPSULE ORAL at 05:32

## 2023-11-20 RX ADMIN — Medication 100 MILLIGRAM(S): at 05:32

## 2023-11-20 RX ADMIN — GABAPENTIN 300 MILLIGRAM(S): 400 CAPSULE ORAL at 22:12

## 2023-11-20 RX ADMIN — Medication 100 MILLIGRAM(S): at 22:12

## 2023-11-20 RX ADMIN — Medication 100 MILLIGRAM(S): at 13:05

## 2023-11-20 RX ADMIN — GABAPENTIN 300 MILLIGRAM(S): 400 CAPSULE ORAL at 13:05

## 2023-11-20 NOTE — PROGRESS NOTE ADULT - ASSESSMENT
Tomasa Hooks is a 63 year old female with PMHx of multiple sclerosis who presented to the ED on 11/19/23 for complaints of generalized weakness and admitted for physical deconditioning    Physical deconditioning, suspect secondary to viral illness  In pt with multiple sclerosis  Complaints of bilateral leg weakness which has been persistent x 4 days  CXR unremarkable, U/A unremarkable, influenza +  Unvaccinated against influenza, declined tamiflu  PT/OT ordered      Chronic medical conditions:  Multiple sclerosis: receives ocrelizumab infusion every six months, last dose was almost six months ago, follows outpatient with neurology    Plan of care discussed with caregiver at bedside.

## 2023-11-21 LAB
CULTURE RESULTS: SIGNIFICANT CHANGE UP
CULTURE RESULTS: SIGNIFICANT CHANGE UP
SPECIMEN SOURCE: SIGNIFICANT CHANGE UP
SPECIMEN SOURCE: SIGNIFICANT CHANGE UP

## 2023-11-21 PROCEDURE — 99232 SBSQ HOSP IP/OBS MODERATE 35: CPT

## 2023-11-21 RX ADMIN — Medication 100 MILLIGRAM(S): at 22:41

## 2023-11-21 RX ADMIN — GABAPENTIN 300 MILLIGRAM(S): 400 CAPSULE ORAL at 05:05

## 2023-11-21 RX ADMIN — GABAPENTIN 300 MILLIGRAM(S): 400 CAPSULE ORAL at 22:41

## 2023-11-21 RX ADMIN — GABAPENTIN 300 MILLIGRAM(S): 400 CAPSULE ORAL at 13:35

## 2023-11-21 RX ADMIN — Medication 100 MILLIGRAM(S): at 13:35

## 2023-11-21 RX ADMIN — Medication 100 MILLIGRAM(S): at 05:05

## 2023-11-21 NOTE — PHYSICAL THERAPY INITIAL EVALUATION ADULT - PERTINENT HX OF CURRENT PROBLEM, REHAB EVAL
Pt is a 63 year old female with PMHx of multiple sclerosis who presented to the ED on 11/19/23 for complaints of generalized weakness and admitted for physical deconditioning

## 2023-11-21 NOTE — OCCUPATIONAL THERAPY INITIAL EVALUATION ADULT - GENERAL OBSERVATIONS, REHAB EVAL
Pt was encountered supine in bed with pts HHA present; NAD, primafit +, alert, verbalized name and , followed commands, cooperative.

## 2023-11-21 NOTE — PHYSICAL THERAPY INITIAL EVALUATION ADULT - NSPTDISCHREC_GEN_A_CORE
Recommending sub acute rehab at this time as pt presents with muscle weakness affecting ability to ambulate and limiting ability to do steps/Sub-acute Rehab

## 2023-11-21 NOTE — PHYSICAL THERAPY INITIAL EVALUATION ADULT - RANGE OF MOTION EXAMINATION, REHAB EVAL
except for L dorsiflexion in 5 deg plantarflexion and R dorsiflexion limited to neutral/bilateral lower extremity ROM was WFL (within functional limits)

## 2023-11-21 NOTE — PHYSICAL THERAPY INITIAL EVALUATION ADULT - GAIT TRAINING, PT EVAL
Pt will ambulate on level surface for 50 ft using rolling walker with supervision with proper hand/feet placement, proper sequencing, and proper body mechanics within 2 wks.

## 2023-11-21 NOTE — OCCUPATIONAL THERAPY INITIAL EVALUATION ADULT - ADDITIONAL COMMENTS
Pt lives alone in private house with 2 steps to enter with no handrails. Once inside, pt has 1 flight of stairs with right handrail to go to her bedroom/bathroom. Pts bathroom is walk in shower set-up, standard height toilet, no grab bars, no commode. Pt has private caregiver from 10am to approx 5pm/Monday to Friday to supervise and makes sure she does not fall and assists with meal prep/cleaning. On Saturday/Sunday, sister comes to assist pt. Pt uses eyeglasses for distance and reading. Pt ambulates at home using rollator and uses w/c for outside. Pt states that she can walk without assistance but would need assist when going up/down 2 JENS without HR.

## 2023-11-21 NOTE — DIETITIAN INITIAL EVALUATION ADULT - ORAL NUTRITION SUPPLEMENTS
[facility] Standard 1.0 nutrition supplement x 1/day (325 kcal & 16 g protein)

## 2023-11-21 NOTE — PHYSICAL THERAPY INITIAL EVALUATION ADULT - GAIT DEVIATIONS NOTED, PT EVAL
unable to fully clear feet from floor, therapist had to advance rolling walker for pt to ambulate forward; repeated verbal cues given to step forward/decreased mark anthony/increased time in double stance/decreased velocity of limb motion/decreased step length/decreased stride length/increased stride width/decreased weight-shifting ability

## 2023-11-21 NOTE — PHYSICAL THERAPY INITIAL EVALUATION ADULT - GENERAL OBSERVATIONS, REHAB EVAL
Chart review done. Pt seen for PT ramonita, RN Pat in agreement. Pt AOX3 , no c/o pain or discomfort. +heplock, + primafit attachments intact.

## 2023-11-21 NOTE — PHYSICAL THERAPY INITIAL EVALUATION ADULT - BED MOBILITY TRAINING, PT EVAL
Pt will safely perform bed mobility skills with supervision with proper hand/feet placement, proper sequencing, and proper body mechanics within 2 wks.

## 2023-11-21 NOTE — DIETITIAN INITIAL EVALUATION ADULT - PERTINENT MEDS FT
MEDICATIONS  (STANDING):  benzonatate 100 milliGRAM(s) Oral three times a day  gabapentin 300 milliGRAM(s) Oral three times a day  influenza   Vaccine 0.5 milliLiter(s) IntraMuscular once    MEDICATIONS  (PRN):  acetaminophen     Tablet .. 650 milliGRAM(s) Oral every 6 hours PRN Temp greater or equal to 38C (100.4F), Mild Pain (1 - 3)  aluminum hydroxide/magnesium hydroxide/simethicone Suspension 30 milliLiter(s) Oral every 4 hours PRN Dyspepsia  melatonin 3 milliGRAM(s) Oral at bedtime PRN Insomnia  ondansetron Injectable 4 milliGRAM(s) IV Push every 8 hours PRN Nausea and/or Vomiting

## 2023-11-21 NOTE — DIETITIAN INITIAL EVALUATION ADULT - OTHER INFO
Pt with PMH of multiple sclerosis; admitted for physical deconditioning, suspect secondary to viral illness (+ for Flu A) in pt with MS.  Pt lives alone; has private caregiver for approximately 7 hours x 5 days. Pt also has supportive sister who visits regularly and assists with food shopping/cooking.  Pt states mostly good appetite and good PO intake PTA; follows mostly regular diet, however per preference, pt does not eat pork or dairy products.  Pt consulted for food insecurity, however denies any food insecurity issues per se, however has general financial concerns as she is on fixed income. Noted SW consult pending.  States consuming mostly % of documented meals during admission, however states does not always like food choices. Alternative menu provided. Pt amenable to [employer] Standard 1.0 nutrition supplement (non-dairy) once daily to optimize PO intake. Pt denies difficulty chewing/swallowing current regular consistency, however states she would like easy to chew consistency for ease, as she takes a long time to chew her food.  Denies any nausea or vomiting. Reports constipation; amenable to prune juice.  Wt stable. Pt with PMH of multiple sclerosis; admitted for physical deconditioning, suspect secondary to viral illness (+ for Flu A) in pt with MS. PT recommending PANDA.  Pt lives alone; has private caregiver for approximately 7 hours x 5 days. Pt also has supportive sister who visits regularly and assists with food shopping/cooking.  Pt states mostly good appetite and good PO intake PTA. Follows mostly regular diet, however per preference, pt does not eat pork or dairy products; kitchen made aware.  Pt consulted for food insecurity, however denies any food insecurity issues per se, however has general financial concerns as she is on fixed income. Noted SW consult pending.  States consuming mostly % of documented meals during admission, however states does not always like food choices. Alternative menu provided. Pt amenable to [employer] Standard 1.0 nutrition supplement (non-dairy) once daily to optimize PO intake. Pt denies difficulty chewing/swallowing current regular consistency, however states she would like easy to chew consistency for ease, as she takes a long time to chew her food.  Denies any nausea or vomiting. Reports constipation; amenable to prune juice.  Wt stable.

## 2023-11-21 NOTE — PHYSICAL THERAPY INITIAL EVALUATION ADULT - ADDITIONAL COMMENTS
Pt lives alone in private house with 2 steps to enter WITHOUT handrails. Once inside, pt has 1 flight of stairs with right handrail to go to her bedroom/bathroom. Bathroom is walk in shower set-up, standard height toilet, no grab bars, no commode. Pt has private caregiver from 10am to approx 5pm/Monday to Friday to supervise and makes sure she does not fall and assists with meal prep/cleaning. On Saturday/Sunday, sister comes to assist pt. Pt uses eyeglasses for distance and reading. Pt ambulates at home using rollator and uses w/c for outside. Pt states that she can walk without assistance but would need assist when going up/down 2 JENS without HR.

## 2023-11-21 NOTE — PHYSICAL THERAPY INITIAL EVALUATION ADULT - TRANSFER TRAINING, PT EVAL
Pt will safely perform bed to chair, chair to bed transfers with supervision with proper hand/feet placement, proper sequencing, and proper body mechanics within 2 wks.
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
## 2023-11-22 PROCEDURE — 99232 SBSQ HOSP IP/OBS MODERATE 35: CPT

## 2023-11-22 RX ORDER — ENOXAPARIN SODIUM 100 MG/ML
40 INJECTION SUBCUTANEOUS EVERY 24 HOURS
Refills: 0 | Status: DISCONTINUED | OUTPATIENT
Start: 2023-11-22 | End: 2023-12-03

## 2023-11-22 RX ORDER — POLYETHYLENE GLYCOL 3350 17 G/17G
17 POWDER, FOR SOLUTION ORAL
Refills: 0 | Status: DISCONTINUED | OUTPATIENT
Start: 2023-11-22 | End: 2023-11-30

## 2023-11-22 RX ORDER — SENNA PLUS 8.6 MG/1
2 TABLET ORAL AT BEDTIME
Refills: 0 | Status: DISCONTINUED | OUTPATIENT
Start: 2023-11-22 | End: 2023-11-30

## 2023-11-22 RX ADMIN — SENNA PLUS 2 TABLET(S): 8.6 TABLET ORAL at 22:41

## 2023-11-22 RX ADMIN — Medication 100 MILLIGRAM(S): at 05:00

## 2023-11-22 RX ADMIN — GABAPENTIN 300 MILLIGRAM(S): 400 CAPSULE ORAL at 13:22

## 2023-11-22 RX ADMIN — Medication 100 MILLIGRAM(S): at 13:23

## 2023-11-22 RX ADMIN — Medication 100 MILLIGRAM(S): at 22:41

## 2023-11-22 RX ADMIN — POLYETHYLENE GLYCOL 3350 17 GRAM(S): 17 POWDER, FOR SOLUTION ORAL at 17:24

## 2023-11-22 RX ADMIN — ENOXAPARIN SODIUM 40 MILLIGRAM(S): 100 INJECTION SUBCUTANEOUS at 17:24

## 2023-11-22 RX ADMIN — GABAPENTIN 300 MILLIGRAM(S): 400 CAPSULE ORAL at 22:41

## 2023-11-22 RX ADMIN — GABAPENTIN 300 MILLIGRAM(S): 400 CAPSULE ORAL at 05:00

## 2023-11-22 NOTE — PROGRESS NOTE ADULT - ASSESSMENT
Tomasa Hooks is a 63 year old female with PMHx of multiple sclerosis who presented to the ED on 11/19/23 for complaints of generalized weakness and admitted for physical deconditioning    # Influenza infection - RVP positive.  Onset greater than 1 week ago.  Supportive care.  # Physical deconditioning, suspect secondary to viral illness.  OOB with PT.  PANDA placement  # Multiple sclerosis: receives ocrelizumab infusion every six months, last dose was almost six months ago, follows outpatient with neurology.  supportive care.  # Constipation - ordered laxatives.   # Inpatient DVT Prophylaxis - Lovenox subcut    Stable for PANDA placement.

## 2023-11-23 LAB
ANION GAP SERPL CALC-SCNC: 4 MMOL/L — LOW (ref 5–17)
ANION GAP SERPL CALC-SCNC: 4 MMOL/L — LOW (ref 5–17)
BUN SERPL-MCNC: 14 MG/DL — SIGNIFICANT CHANGE UP (ref 7–23)
BUN SERPL-MCNC: 14 MG/DL — SIGNIFICANT CHANGE UP (ref 7–23)
CALCIUM SERPL-MCNC: 9.1 MG/DL — SIGNIFICANT CHANGE UP (ref 8.5–10.1)
CALCIUM SERPL-MCNC: 9.1 MG/DL — SIGNIFICANT CHANGE UP (ref 8.5–10.1)
CHLORIDE SERPL-SCNC: 104 MMOL/L — SIGNIFICANT CHANGE UP (ref 96–108)
CHLORIDE SERPL-SCNC: 104 MMOL/L — SIGNIFICANT CHANGE UP (ref 96–108)
CO2 SERPL-SCNC: 29 MMOL/L — SIGNIFICANT CHANGE UP (ref 22–31)
CO2 SERPL-SCNC: 29 MMOL/L — SIGNIFICANT CHANGE UP (ref 22–31)
CREAT SERPL-MCNC: 0.7 MG/DL — SIGNIFICANT CHANGE UP (ref 0.5–1.3)
CREAT SERPL-MCNC: 0.7 MG/DL — SIGNIFICANT CHANGE UP (ref 0.5–1.3)
EGFR: 97 ML/MIN/1.73M2 — SIGNIFICANT CHANGE UP
EGFR: 97 ML/MIN/1.73M2 — SIGNIFICANT CHANGE UP
GLUCOSE SERPL-MCNC: 108 MG/DL — HIGH (ref 70–99)
GLUCOSE SERPL-MCNC: 108 MG/DL — HIGH (ref 70–99)
HCT VFR BLD CALC: 45.7 % — HIGH (ref 34.5–45)
HCT VFR BLD CALC: 45.7 % — HIGH (ref 34.5–45)
HGB BLD-MCNC: 14.7 G/DL — SIGNIFICANT CHANGE UP (ref 11.5–15.5)
HGB BLD-MCNC: 14.7 G/DL — SIGNIFICANT CHANGE UP (ref 11.5–15.5)
MCHC RBC-ENTMCNC: 28 PG — SIGNIFICANT CHANGE UP (ref 27–34)
MCHC RBC-ENTMCNC: 28 PG — SIGNIFICANT CHANGE UP (ref 27–34)
MCHC RBC-ENTMCNC: 32.2 G/DL — SIGNIFICANT CHANGE UP (ref 32–36)
MCHC RBC-ENTMCNC: 32.2 G/DL — SIGNIFICANT CHANGE UP (ref 32–36)
MCV RBC AUTO: 87 FL — SIGNIFICANT CHANGE UP (ref 80–100)
MCV RBC AUTO: 87 FL — SIGNIFICANT CHANGE UP (ref 80–100)
NRBC # BLD: 0 /100 WBCS — SIGNIFICANT CHANGE UP (ref 0–0)
NRBC # BLD: 0 /100 WBCS — SIGNIFICANT CHANGE UP (ref 0–0)
PLATELET # BLD AUTO: 210 K/UL — SIGNIFICANT CHANGE UP (ref 150–400)
PLATELET # BLD AUTO: 210 K/UL — SIGNIFICANT CHANGE UP (ref 150–400)
POTASSIUM SERPL-MCNC: 5 MMOL/L — SIGNIFICANT CHANGE UP (ref 3.5–5.3)
POTASSIUM SERPL-MCNC: 5 MMOL/L — SIGNIFICANT CHANGE UP (ref 3.5–5.3)
POTASSIUM SERPL-SCNC: 5 MMOL/L — SIGNIFICANT CHANGE UP (ref 3.5–5.3)
POTASSIUM SERPL-SCNC: 5 MMOL/L — SIGNIFICANT CHANGE UP (ref 3.5–5.3)
RBC # BLD: 5.25 M/UL — HIGH (ref 3.8–5.2)
RBC # BLD: 5.25 M/UL — HIGH (ref 3.8–5.2)
RBC # FLD: 13.2 % — SIGNIFICANT CHANGE UP (ref 10.3–14.5)
RBC # FLD: 13.2 % — SIGNIFICANT CHANGE UP (ref 10.3–14.5)
SODIUM SERPL-SCNC: 137 MMOL/L — SIGNIFICANT CHANGE UP (ref 135–145)
SODIUM SERPL-SCNC: 137 MMOL/L — SIGNIFICANT CHANGE UP (ref 135–145)
WBC # BLD: 3.85 K/UL — SIGNIFICANT CHANGE UP (ref 3.8–10.5)
WBC # BLD: 3.85 K/UL — SIGNIFICANT CHANGE UP (ref 3.8–10.5)
WBC # FLD AUTO: 3.85 K/UL — SIGNIFICANT CHANGE UP (ref 3.8–10.5)
WBC # FLD AUTO: 3.85 K/UL — SIGNIFICANT CHANGE UP (ref 3.8–10.5)

## 2023-11-23 PROCEDURE — 99232 SBSQ HOSP IP/OBS MODERATE 35: CPT

## 2023-11-23 RX ORDER — LACTULOSE 10 G/15ML
10 SOLUTION ORAL ONCE
Refills: 0 | Status: COMPLETED | OUTPATIENT
Start: 2023-11-23 | End: 2023-11-23

## 2023-11-23 RX ORDER — LACTULOSE 10 G/15ML
15 SOLUTION ORAL ONCE
Refills: 0 | Status: DISCONTINUED | OUTPATIENT
Start: 2023-11-23 | End: 2023-11-23

## 2023-11-23 RX ADMIN — POLYETHYLENE GLYCOL 3350 17 GRAM(S): 17 POWDER, FOR SOLUTION ORAL at 05:29

## 2023-11-23 RX ADMIN — Medication 1 ENEMA: at 15:42

## 2023-11-23 RX ADMIN — Medication 100 MILLIGRAM(S): at 05:28

## 2023-11-23 RX ADMIN — SENNA PLUS 2 TABLET(S): 8.6 TABLET ORAL at 22:10

## 2023-11-23 RX ADMIN — GABAPENTIN 300 MILLIGRAM(S): 400 CAPSULE ORAL at 05:28

## 2023-11-23 RX ADMIN — POLYETHYLENE GLYCOL 3350 17 GRAM(S): 17 POWDER, FOR SOLUTION ORAL at 17:30

## 2023-11-23 RX ADMIN — LACTULOSE 10 GRAM(S): 10 SOLUTION ORAL at 13:54

## 2023-11-23 RX ADMIN — ENOXAPARIN SODIUM 40 MILLIGRAM(S): 100 INJECTION SUBCUTANEOUS at 17:30

## 2023-11-23 RX ADMIN — Medication 100 MILLIGRAM(S): at 13:22

## 2023-11-23 RX ADMIN — GABAPENTIN 300 MILLIGRAM(S): 400 CAPSULE ORAL at 22:09

## 2023-11-23 RX ADMIN — GABAPENTIN 300 MILLIGRAM(S): 400 CAPSULE ORAL at 13:22

## 2023-11-23 RX ADMIN — Medication 100 MILLIGRAM(S): at 22:08

## 2023-11-23 NOTE — PROGRESS NOTE ADULT - ASSESSMENT
Tomasa Hooks is a 63 year old female with PMHx of multiple sclerosis who presented to the ED on 11/19/23 for complaints of generalized weakness and admitted for physical deconditioning    # Influenza infection - RVP positive.  Onset greater than 1 week ago.  Supportive care.  # Physical deconditioning, suspect secondary to viral illness.  OOB with PT.  PANDA placement  # Multiple sclerosis: receives ocrelizumab infusion every six months, last dose was almost six months ago, follows outpatient with neurology.  supportive care.  # Constipation - ordered laxatives. Enema today if no improvement.   # Inpatient DVT Prophylaxis - Lovenox subcut    Stable for PANDA placement.

## 2023-11-24 LAB
ANION GAP SERPL CALC-SCNC: 7 MMOL/L — SIGNIFICANT CHANGE UP (ref 5–17)
ANION GAP SERPL CALC-SCNC: 7 MMOL/L — SIGNIFICANT CHANGE UP (ref 5–17)
BUN SERPL-MCNC: 10 MG/DL — SIGNIFICANT CHANGE UP (ref 7–23)
BUN SERPL-MCNC: 10 MG/DL — SIGNIFICANT CHANGE UP (ref 7–23)
CALCIUM SERPL-MCNC: 8.8 MG/DL — SIGNIFICANT CHANGE UP (ref 8.5–10.1)
CALCIUM SERPL-MCNC: 8.8 MG/DL — SIGNIFICANT CHANGE UP (ref 8.5–10.1)
CHLORIDE SERPL-SCNC: 105 MMOL/L — SIGNIFICANT CHANGE UP (ref 96–108)
CHLORIDE SERPL-SCNC: 105 MMOL/L — SIGNIFICANT CHANGE UP (ref 96–108)
CO2 SERPL-SCNC: 26 MMOL/L — SIGNIFICANT CHANGE UP (ref 22–31)
CO2 SERPL-SCNC: 26 MMOL/L — SIGNIFICANT CHANGE UP (ref 22–31)
CREAT SERPL-MCNC: 0.67 MG/DL — SIGNIFICANT CHANGE UP (ref 0.5–1.3)
CREAT SERPL-MCNC: 0.67 MG/DL — SIGNIFICANT CHANGE UP (ref 0.5–1.3)
EGFR: 98 ML/MIN/1.73M2 — SIGNIFICANT CHANGE UP
EGFR: 98 ML/MIN/1.73M2 — SIGNIFICANT CHANGE UP
GLUCOSE SERPL-MCNC: 110 MG/DL — HIGH (ref 70–99)
GLUCOSE SERPL-MCNC: 110 MG/DL — HIGH (ref 70–99)
HCT VFR BLD CALC: 45.8 % — HIGH (ref 34.5–45)
HCT VFR BLD CALC: 45.8 % — HIGH (ref 34.5–45)
HGB BLD-MCNC: 14.9 G/DL — SIGNIFICANT CHANGE UP (ref 11.5–15.5)
HGB BLD-MCNC: 14.9 G/DL — SIGNIFICANT CHANGE UP (ref 11.5–15.5)
MCHC RBC-ENTMCNC: 27.6 PG — SIGNIFICANT CHANGE UP (ref 27–34)
MCHC RBC-ENTMCNC: 27.6 PG — SIGNIFICANT CHANGE UP (ref 27–34)
MCHC RBC-ENTMCNC: 32.5 G/DL — SIGNIFICANT CHANGE UP (ref 32–36)
MCHC RBC-ENTMCNC: 32.5 G/DL — SIGNIFICANT CHANGE UP (ref 32–36)
MCV RBC AUTO: 85 FL — SIGNIFICANT CHANGE UP (ref 80–100)
MCV RBC AUTO: 85 FL — SIGNIFICANT CHANGE UP (ref 80–100)
NRBC # BLD: 0 /100 WBCS — SIGNIFICANT CHANGE UP (ref 0–0)
NRBC # BLD: 0 /100 WBCS — SIGNIFICANT CHANGE UP (ref 0–0)
PLATELET # BLD AUTO: 234 K/UL — SIGNIFICANT CHANGE UP (ref 150–400)
PLATELET # BLD AUTO: 234 K/UL — SIGNIFICANT CHANGE UP (ref 150–400)
POTASSIUM SERPL-MCNC: 4 MMOL/L — SIGNIFICANT CHANGE UP (ref 3.5–5.3)
POTASSIUM SERPL-MCNC: 4 MMOL/L — SIGNIFICANT CHANGE UP (ref 3.5–5.3)
POTASSIUM SERPL-SCNC: 4 MMOL/L — SIGNIFICANT CHANGE UP (ref 3.5–5.3)
POTASSIUM SERPL-SCNC: 4 MMOL/L — SIGNIFICANT CHANGE UP (ref 3.5–5.3)
RBC # BLD: 5.39 M/UL — HIGH (ref 3.8–5.2)
RBC # BLD: 5.39 M/UL — HIGH (ref 3.8–5.2)
RBC # FLD: 13.1 % — SIGNIFICANT CHANGE UP (ref 10.3–14.5)
RBC # FLD: 13.1 % — SIGNIFICANT CHANGE UP (ref 10.3–14.5)
SODIUM SERPL-SCNC: 138 MMOL/L — SIGNIFICANT CHANGE UP (ref 135–145)
SODIUM SERPL-SCNC: 138 MMOL/L — SIGNIFICANT CHANGE UP (ref 135–145)
WBC # BLD: 3.87 K/UL — SIGNIFICANT CHANGE UP (ref 3.8–10.5)
WBC # BLD: 3.87 K/UL — SIGNIFICANT CHANGE UP (ref 3.8–10.5)
WBC # FLD AUTO: 3.87 K/UL — SIGNIFICANT CHANGE UP (ref 3.8–10.5)
WBC # FLD AUTO: 3.87 K/UL — SIGNIFICANT CHANGE UP (ref 3.8–10.5)

## 2023-11-24 PROCEDURE — 99232 SBSQ HOSP IP/OBS MODERATE 35: CPT

## 2023-11-24 RX ADMIN — POLYETHYLENE GLYCOL 3350 17 GRAM(S): 17 POWDER, FOR SOLUTION ORAL at 17:27

## 2023-11-24 RX ADMIN — Medication 100 MILLIGRAM(S): at 13:45

## 2023-11-24 RX ADMIN — GABAPENTIN 300 MILLIGRAM(S): 400 CAPSULE ORAL at 21:30

## 2023-11-24 RX ADMIN — ENOXAPARIN SODIUM 40 MILLIGRAM(S): 100 INJECTION SUBCUTANEOUS at 17:26

## 2023-11-24 RX ADMIN — Medication 100 MILLIGRAM(S): at 05:36

## 2023-11-24 RX ADMIN — GABAPENTIN 300 MILLIGRAM(S): 400 CAPSULE ORAL at 13:46

## 2023-11-24 RX ADMIN — SENNA PLUS 2 TABLET(S): 8.6 TABLET ORAL at 21:31

## 2023-11-24 RX ADMIN — GABAPENTIN 300 MILLIGRAM(S): 400 CAPSULE ORAL at 05:36

## 2023-11-24 RX ADMIN — Medication 100 MILLIGRAM(S): at 21:30

## 2023-11-24 NOTE — PROGRESS NOTE ADULT - ASSESSMENT
Tomasa Hooks is a 63 year old female with PMHx of multiple sclerosis who presented to the ED on 11/19/23 for complaints of generalized weakness and admitted for physical deconditioning, found to be influenza positive.  Now asymptomatic.      # Influenza infection - RVP positive.  Onset greater than 1 week ago.  Supportive care.  # Physical deconditioning, suspect secondary to viral illness.  OOB with PT.  PANDA placement  # Multiple sclerosis: receives ocrelizumab infusion every six months, last dose was almost six months ago, follows outpatient with neurology.  supportive care.  # Constipation - ordered laxatives. Enema today if no improvement.   # Inpatient DVT Prophylaxis - Lovenox subcut    Stable for PANDA placement.

## 2023-11-25 PROCEDURE — 99232 SBSQ HOSP IP/OBS MODERATE 35: CPT

## 2023-11-25 RX ORDER — LACTULOSE 10 G/15ML
10 SOLUTION ORAL DAILY
Refills: 0 | Status: DISCONTINUED | OUTPATIENT
Start: 2023-11-25 | End: 2023-11-26

## 2023-11-25 RX ADMIN — POLYETHYLENE GLYCOL 3350 17 GRAM(S): 17 POWDER, FOR SOLUTION ORAL at 17:11

## 2023-11-25 RX ADMIN — GABAPENTIN 300 MILLIGRAM(S): 400 CAPSULE ORAL at 13:09

## 2023-11-25 RX ADMIN — Medication 100 MILLIGRAM(S): at 21:43

## 2023-11-25 RX ADMIN — POLYETHYLENE GLYCOL 3350 17 GRAM(S): 17 POWDER, FOR SOLUTION ORAL at 05:54

## 2023-11-25 RX ADMIN — GABAPENTIN 300 MILLIGRAM(S): 400 CAPSULE ORAL at 21:43

## 2023-11-25 RX ADMIN — GABAPENTIN 300 MILLIGRAM(S): 400 CAPSULE ORAL at 05:52

## 2023-11-25 RX ADMIN — Medication 100 MILLIGRAM(S): at 13:08

## 2023-11-25 RX ADMIN — ENOXAPARIN SODIUM 40 MILLIGRAM(S): 100 INJECTION SUBCUTANEOUS at 17:11

## 2023-11-25 RX ADMIN — LACTULOSE 10 GRAM(S): 10 SOLUTION ORAL at 17:11

## 2023-11-25 RX ADMIN — SENNA PLUS 2 TABLET(S): 8.6 TABLET ORAL at 21:43

## 2023-11-25 RX ADMIN — Medication 100 MILLIGRAM(S): at 05:52

## 2023-11-25 NOTE — PROGRESS NOTE ADULT - ASSESSMENT
Tomasa Hooks is a 63 year old female with PMHx of multiple sclerosis who presented to the ED on 11/19/23 for complaints of generalized weakness and admitted for physical deconditioning, found to be influenza positive.  Now asymptomatic.      # Influenza infection - RVP positive.  Onset greater than 1 week ago.  Supportive care.  # Physical deconditioning, suspect secondary to viral illness.  OOB with PT.  PANDA placement  # Multiple sclerosis: receives ocrelizumab infusion every six months, last dose was almost six months ago, follows outpatient with neurology.  supportive care.  # Constipation - ordered laxatives. Enema today if no improvement.   # Inpatient DVT Prophylaxis - Lovenox subcut    Stable for PNADA placement.

## 2023-11-26 PROCEDURE — 99232 SBSQ HOSP IP/OBS MODERATE 35: CPT

## 2023-11-26 RX ADMIN — Medication 100 MILLIGRAM(S): at 13:25

## 2023-11-26 RX ADMIN — GABAPENTIN 300 MILLIGRAM(S): 400 CAPSULE ORAL at 13:25

## 2023-11-26 RX ADMIN — GABAPENTIN 300 MILLIGRAM(S): 400 CAPSULE ORAL at 05:38

## 2023-11-26 RX ADMIN — Medication 650 MILLIGRAM(S): at 13:28

## 2023-11-26 RX ADMIN — Medication 650 MILLIGRAM(S): at 11:12

## 2023-11-26 RX ADMIN — Medication 100 MILLIGRAM(S): at 21:38

## 2023-11-26 RX ADMIN — LACTULOSE 10 GRAM(S): 10 SOLUTION ORAL at 11:09

## 2023-11-26 RX ADMIN — GABAPENTIN 300 MILLIGRAM(S): 400 CAPSULE ORAL at 21:39

## 2023-11-26 RX ADMIN — Medication 100 MILLIGRAM(S): at 05:38

## 2023-11-26 RX ADMIN — ENOXAPARIN SODIUM 40 MILLIGRAM(S): 100 INJECTION SUBCUTANEOUS at 17:17

## 2023-11-26 RX ADMIN — SENNA PLUS 2 TABLET(S): 8.6 TABLET ORAL at 21:38

## 2023-11-26 RX ADMIN — POLYETHYLENE GLYCOL 3350 17 GRAM(S): 17 POWDER, FOR SOLUTION ORAL at 05:38

## 2023-11-26 NOTE — PROGRESS NOTE ADULT - ASSESSMENT
Tomasa Hooks is a 63 year old female with PMHx of multiple sclerosis who presented to the ED on 11/19/23 for complaints of generalized weakness and admitted for physical deconditioning, found to be influenza positive.  Now asymptomatic.      # Influenza infection - RVP positive.  Onset greater than 1 week ago.  Supportive care.  # Physical deconditioning, suspect secondary to viral illness.  OOB with PT.  PANDA placement  # Multiple sclerosis: receives ocrelizumab infusion every six months, last dose was almost six months ago, follows outpatient with neurology.  supportive care.  # Constipation - Resolved.  Deescalate Lactulose.    # Inpatient DVT Prophylaxis - Lovenox subcut    Stable for PANDA placement.

## 2023-11-27 LAB
ANION GAP SERPL CALC-SCNC: 6 MMOL/L — SIGNIFICANT CHANGE UP (ref 5–17)
ANION GAP SERPL CALC-SCNC: 6 MMOL/L — SIGNIFICANT CHANGE UP (ref 5–17)
BUN SERPL-MCNC: 14 MG/DL — SIGNIFICANT CHANGE UP (ref 7–23)
BUN SERPL-MCNC: 14 MG/DL — SIGNIFICANT CHANGE UP (ref 7–23)
CALCIUM SERPL-MCNC: 8.7 MG/DL — SIGNIFICANT CHANGE UP (ref 8.5–10.1)
CALCIUM SERPL-MCNC: 8.7 MG/DL — SIGNIFICANT CHANGE UP (ref 8.5–10.1)
CHLORIDE SERPL-SCNC: 107 MMOL/L — SIGNIFICANT CHANGE UP (ref 96–108)
CHLORIDE SERPL-SCNC: 107 MMOL/L — SIGNIFICANT CHANGE UP (ref 96–108)
CO2 SERPL-SCNC: 26 MMOL/L — SIGNIFICANT CHANGE UP (ref 22–31)
CO2 SERPL-SCNC: 26 MMOL/L — SIGNIFICANT CHANGE UP (ref 22–31)
CREAT SERPL-MCNC: 0.58 MG/DL — SIGNIFICANT CHANGE UP (ref 0.5–1.3)
CREAT SERPL-MCNC: 0.58 MG/DL — SIGNIFICANT CHANGE UP (ref 0.5–1.3)
EGFR: 102 ML/MIN/1.73M2 — SIGNIFICANT CHANGE UP
EGFR: 102 ML/MIN/1.73M2 — SIGNIFICANT CHANGE UP
GLUCOSE SERPL-MCNC: 113 MG/DL — HIGH (ref 70–99)
GLUCOSE SERPL-MCNC: 113 MG/DL — HIGH (ref 70–99)
HCT VFR BLD CALC: 42.6 % — SIGNIFICANT CHANGE UP (ref 34.5–45)
HCT VFR BLD CALC: 42.6 % — SIGNIFICANT CHANGE UP (ref 34.5–45)
HGB BLD-MCNC: 13.6 G/DL — SIGNIFICANT CHANGE UP (ref 11.5–15.5)
HGB BLD-MCNC: 13.6 G/DL — SIGNIFICANT CHANGE UP (ref 11.5–15.5)
MCHC RBC-ENTMCNC: 27.5 PG — SIGNIFICANT CHANGE UP (ref 27–34)
MCHC RBC-ENTMCNC: 27.5 PG — SIGNIFICANT CHANGE UP (ref 27–34)
MCHC RBC-ENTMCNC: 31.9 G/DL — LOW (ref 32–36)
MCHC RBC-ENTMCNC: 31.9 G/DL — LOW (ref 32–36)
MCV RBC AUTO: 86.1 FL — SIGNIFICANT CHANGE UP (ref 80–100)
MCV RBC AUTO: 86.1 FL — SIGNIFICANT CHANGE UP (ref 80–100)
NRBC # BLD: 0 /100 WBCS — SIGNIFICANT CHANGE UP (ref 0–0)
NRBC # BLD: 0 /100 WBCS — SIGNIFICANT CHANGE UP (ref 0–0)
PLATELET # BLD AUTO: 314 K/UL — SIGNIFICANT CHANGE UP (ref 150–400)
PLATELET # BLD AUTO: 314 K/UL — SIGNIFICANT CHANGE UP (ref 150–400)
POTASSIUM SERPL-MCNC: 3.7 MMOL/L — SIGNIFICANT CHANGE UP (ref 3.5–5.3)
POTASSIUM SERPL-MCNC: 3.7 MMOL/L — SIGNIFICANT CHANGE UP (ref 3.5–5.3)
POTASSIUM SERPL-SCNC: 3.7 MMOL/L — SIGNIFICANT CHANGE UP (ref 3.5–5.3)
POTASSIUM SERPL-SCNC: 3.7 MMOL/L — SIGNIFICANT CHANGE UP (ref 3.5–5.3)
RBC # BLD: 4.95 M/UL — SIGNIFICANT CHANGE UP (ref 3.8–5.2)
RBC # BLD: 4.95 M/UL — SIGNIFICANT CHANGE UP (ref 3.8–5.2)
RBC # FLD: 13 % — SIGNIFICANT CHANGE UP (ref 10.3–14.5)
RBC # FLD: 13 % — SIGNIFICANT CHANGE UP (ref 10.3–14.5)
SODIUM SERPL-SCNC: 139 MMOL/L — SIGNIFICANT CHANGE UP (ref 135–145)
SODIUM SERPL-SCNC: 139 MMOL/L — SIGNIFICANT CHANGE UP (ref 135–145)
WBC # BLD: 6.8 K/UL — SIGNIFICANT CHANGE UP (ref 3.8–10.5)
WBC # BLD: 6.8 K/UL — SIGNIFICANT CHANGE UP (ref 3.8–10.5)
WBC # FLD AUTO: 6.8 K/UL — SIGNIFICANT CHANGE UP (ref 3.8–10.5)
WBC # FLD AUTO: 6.8 K/UL — SIGNIFICANT CHANGE UP (ref 3.8–10.5)

## 2023-11-27 PROCEDURE — 99232 SBSQ HOSP IP/OBS MODERATE 35: CPT

## 2023-11-27 RX ADMIN — ENOXAPARIN SODIUM 40 MILLIGRAM(S): 100 INJECTION SUBCUTANEOUS at 17:41

## 2023-11-27 RX ADMIN — Medication 100 MILLIGRAM(S): at 14:42

## 2023-11-27 RX ADMIN — Medication 100 MILLIGRAM(S): at 21:52

## 2023-11-27 RX ADMIN — Medication 100 MILLIGRAM(S): at 05:06

## 2023-11-27 RX ADMIN — GABAPENTIN 300 MILLIGRAM(S): 400 CAPSULE ORAL at 14:41

## 2023-11-27 RX ADMIN — GABAPENTIN 300 MILLIGRAM(S): 400 CAPSULE ORAL at 05:06

## 2023-11-27 RX ADMIN — POLYETHYLENE GLYCOL 3350 17 GRAM(S): 17 POWDER, FOR SOLUTION ORAL at 05:06

## 2023-11-27 RX ADMIN — GABAPENTIN 300 MILLIGRAM(S): 400 CAPSULE ORAL at 22:09

## 2023-11-27 NOTE — PROGRESS NOTE ADULT - ASSESSMENT
Tomasa Hooks is a 63 year old female with PMHx of multiple sclerosis who presented to the ED on 11/19/23 for complaints of generalized weakness and admitted for physical deconditioning, found to be influenza positive.  Now asymptomatic.      # Influenza infection - RVP positive.  Onset greater than 1 week ago.  Supportive care.  # Physical deconditioning, suspect secondary to viral illness.  OOB with PT.  PANDA placement  # Multiple sclerosis: receives ocrelizumab infusion every six months, last dose was almost six months ago, follows outpatient with neurology.  supportive care.  # Constipation - Resolved.  Deescalate Lactulose.    # Inpatient DVT Prophylaxis - Lovenox subcut    Pt still with poor ambulatory status.  Per CM, has emergency medicaid, no PANDA benefits.  Encourage OOB with PT, aides.  Eventual d/c home when safe discharge plan is clarified, d/w CM.  Tomasa Hooks is a 63 year old female with PMHx of multiple sclerosis who presented to the ED on 11/19/23 for complaints of generalized weakness and admitted for physical deconditioning, found to be influenza positive.  Now asymptomatic.      # Influenza infection - RVP positive.  Onset greater than 1 week ago.  Supportive care.  # Physical deconditioning, suspect secondary to viral illness.  OOB with PT.  PNADA placement  # Multiple sclerosis: receives ocrelizumab infusion every six months, last dose was almost six months ago, follows outpatient with neurology.  supportive care.  # Constipation - Resolved.  Deescalate Lactulose.    # Inpatient DVT Prophylaxis - Lovenox subcut    Pt still with poor ambulatory status.  Per CM, has emergency medicaid, no PANDA benefits.  Encourage OOB with PT, aides.  Per PT note 11/24, ambulated 10' with RW  Eventual d/c home when safe discharge plan is clarified, d/w CM.

## 2023-11-28 PROCEDURE — 99232 SBSQ HOSP IP/OBS MODERATE 35: CPT

## 2023-11-28 RX ADMIN — ENOXAPARIN SODIUM 40 MILLIGRAM(S): 100 INJECTION SUBCUTANEOUS at 20:22

## 2023-11-28 RX ADMIN — GABAPENTIN 300 MILLIGRAM(S): 400 CAPSULE ORAL at 22:17

## 2023-11-28 RX ADMIN — Medication 100 MILLIGRAM(S): at 05:59

## 2023-11-28 RX ADMIN — Medication 100 MILLIGRAM(S): at 20:26

## 2023-11-28 RX ADMIN — GABAPENTIN 300 MILLIGRAM(S): 400 CAPSULE ORAL at 05:58

## 2023-11-28 RX ADMIN — SENNA PLUS 2 TABLET(S): 8.6 TABLET ORAL at 22:17

## 2023-11-28 NOTE — CHART NOTE - NSCHARTNOTEFT_GEN_A_CORE
Pt with PMH of multiple sclerosis; admitted for physical deconditioning, suspect secondary to viral illness (+ for influenza) in pt with MS.  Per CM, pt has emergency Medicaid, no PANDA benefits, not being accepted to any rehab. Pt to work with Physical Therapy to help improve ambulation, and plan is for eventual discharge home when safe/appropriate.    Factors impacting intake: [x] none [ ] nausea  [ ] vomiting [ ] diarrhea [ ] constipation  [ ]chewing problems [ ] swallowing issues  [ ] other:     Diet Prescription: Easy to Chew + [employer] Standard 1.0 x 1/day (325 kcal & 16 g protein)    Intake: % of meals and supplement    Current Weight: No wt x 1 week; request current wt  % Weight Change: N/A    No edema documented as per flow sheets    Pertinent Medications: MEDICATIONS  (STANDING):  benzonatate 100 milliGRAM(s) Oral three times a day  enoxaparin Injectable 40 milliGRAM(s) SubCutaneous every 24 hours  gabapentin 300 milliGRAM(s) Oral three times a day  influenza   Vaccine 0.5 milliLiter(s) IntraMuscular once  polyethylene glycol 3350 17 Gram(s) Oral two times a day  senna 2 Tablet(s) Oral at bedtime    MEDICATIONS  (PRN):  acetaminophen     Tablet .. 650 milliGRAM(s) Oral every 6 hours PRN Temp greater or equal to 38C (100.4F), Mild Pain (1 - 3)  aluminum hydroxide/magnesium hydroxide/simethicone Suspension 30 milliLiter(s) Oral every 4 hours PRN Dyspepsia  bisacodyl 5 milliGRAM(s) Oral every 12 hours PRN Constipation  melatonin 3 milliGRAM(s) Oral at bedtime PRN Insomnia  ondansetron Injectable 4 milliGRAM(s) IV Push every 8 hours PRN Nausea and/or Vomiting    Pertinent Labs: 11-27 Na139 mmol/L Glu 113 mg/dL<H> K+ 3.7 mmol/L Cr  0.58 mg/dL BUN 14 mg/dL    Skin: No pressure ulcers noted as per flow sheets    Estimated Needs:   [x] no change since previous assessment on 11/21  [ ] recalculated:     Previous Nutrition Diagnosis:   No active nutrition diagnosis identified at this time, Patient meeting estimated nutrient needs    Nutrition Diagnosis is [x] ongoing - No active nutrition diagnosis remains [ ] resolved [ ] not applicable     New Nutrition Diagnosis: [x] not applicable       Interventions:   Recommend  [x] Continue with current diet rx as noted  [ ] Change Diet To:  [ ] Nutrition Supplement  [ ] Nutrition Support  [ ] Other:     Monitoring and Evaluation:   [ x ] PO intake [ x ] Tolerance to diet prescription [ x ] weights [ x ] labs[ x ] follow up per protocol  [ ] other: [Negative] : Allergic/Immunologic [Edema Limbs: Grade 2 - >10 - 30% inter-limb discrepancy in volume or circumference at point of greatest visible difference; readily apparent obscuration of anatomic architecture; obliteration of skin folds; readily apparent deviation from normal anatomic] : Edema Limbs: Grade 2 - >10 - 30% inter-limb discrepancy in volume or circumference at point of greatest visible difference; readily apparent obscuration of anatomic architecture; obliteration of skin folds; readily apparent deviation from normal anatomic contour; limiting instrumental ADL [Fatigue: Grade 0] : Fatigue: Grade 0 [Neck Edema: Grade 0] : Neck Edema: Grade 0 [Breast Pain: Grade 1 - Mild pain] : Breast Pain: Grade 1 - Mild pain [[id number]] : uses cane

## 2023-11-28 NOTE — PROGRESS NOTE ADULT - ASSESSMENT
Tomasa Hooks is a 63 year old female with PMHx of multiple sclerosis who presented to the ED on 11/19/23 for complaints of generalized weakness and admitted for physical deconditioning, found to be influenza positive.  Now asymptomatic.      # Influenza infection - RVP positive.  Onset greater than 1 week ago.  Supportive care.  # Physical deconditioning, suspect secondary to viral illness.  OOB with PT.  PANDA placement  # Multiple sclerosis: receives ocrelizumab infusion every six months, last dose was almost six months ago, follows outpatient with neurology.  supportive care.  # Constipation - Resolved.  Deescalate Lactulose.    # Inpatient DVT Prophylaxis - Lovenox subcut    Pt still with poor ambulatory status.  Per CM, has emergency medicaid, no PANDA benefits.  Encourage OOB with PT, aides.  Per PT note 11/24, ambulated 10' with RW  Eventual d/c home when safe discharge plan is clarified, d/w CM.

## 2023-11-29 PROCEDURE — 99232 SBSQ HOSP IP/OBS MODERATE 35: CPT

## 2023-11-29 RX ADMIN — Medication 650 MILLIGRAM(S): at 21:40

## 2023-11-29 RX ADMIN — ENOXAPARIN SODIUM 40 MILLIGRAM(S): 100 INJECTION SUBCUTANEOUS at 19:08

## 2023-11-29 RX ADMIN — POLYETHYLENE GLYCOL 3350 17 GRAM(S): 17 POWDER, FOR SOLUTION ORAL at 19:08

## 2023-11-29 RX ADMIN — Medication 100 MILLIGRAM(S): at 21:57

## 2023-11-29 RX ADMIN — Medication 650 MILLIGRAM(S): at 11:20

## 2023-11-29 RX ADMIN — Medication 100 MILLIGRAM(S): at 06:11

## 2023-11-29 RX ADMIN — GABAPENTIN 300 MILLIGRAM(S): 400 CAPSULE ORAL at 06:09

## 2023-11-29 RX ADMIN — Medication 650 MILLIGRAM(S): at 10:34

## 2023-11-29 RX ADMIN — Medication 650 MILLIGRAM(S): at 20:36

## 2023-11-29 RX ADMIN — GABAPENTIN 300 MILLIGRAM(S): 400 CAPSULE ORAL at 13:47

## 2023-11-29 RX ADMIN — Medication 100 MILLIGRAM(S): at 13:46

## 2023-11-29 RX ADMIN — GABAPENTIN 300 MILLIGRAM(S): 400 CAPSULE ORAL at 21:56

## 2023-11-29 NOTE — PROGRESS NOTE ADULT - ASSESSMENT
Tomasa Hooks is a 63 year old female with PMHx of multiple sclerosis who presented to the ED on 11/19/23 for complaints of generalized weakness and admitted for physical deconditioning, found to be influenza positive.  Now asymptomatic.      # Influenza infection - RVP positive.  Onset greater than 1 week ago.  Supportive care.  # Physical deconditioning, suspect secondary to viral illness.  OOB with PT.  PANDA placement  # Multiple sclerosis: receives ocrelizumab infusion every six months, last dose was almost six months ago, follows outpatient with neurology.  supportive care.  # Constipation - Resolved.  Deescalate Lactulose.    # Inpatient DVT Prophylaxis - Lovenox subcut    Pt still with poor ambulatory status.  Per CM, has emergency medicaid, no PANDA benefits.  Encourage OOB with PT, aides.  Per PT note 11/24, ambulated 10' with RW.  Last PT note from 11/28 was limited.    Eventual d/c home when safe discharge plan is clarified, d/w CM.

## 2023-11-30 LAB
ANION GAP SERPL CALC-SCNC: 7 MMOL/L — SIGNIFICANT CHANGE UP (ref 5–17)
ANION GAP SERPL CALC-SCNC: 7 MMOL/L — SIGNIFICANT CHANGE UP (ref 5–17)
BUN SERPL-MCNC: 13 MG/DL — SIGNIFICANT CHANGE UP (ref 7–23)
BUN SERPL-MCNC: 13 MG/DL — SIGNIFICANT CHANGE UP (ref 7–23)
CALCIUM SERPL-MCNC: 8.9 MG/DL — SIGNIFICANT CHANGE UP (ref 8.5–10.1)
CALCIUM SERPL-MCNC: 8.9 MG/DL — SIGNIFICANT CHANGE UP (ref 8.5–10.1)
CHLORIDE SERPL-SCNC: 108 MMOL/L — SIGNIFICANT CHANGE UP (ref 96–108)
CHLORIDE SERPL-SCNC: 108 MMOL/L — SIGNIFICANT CHANGE UP (ref 96–108)
CO2 SERPL-SCNC: 26 MMOL/L — SIGNIFICANT CHANGE UP (ref 22–31)
CO2 SERPL-SCNC: 26 MMOL/L — SIGNIFICANT CHANGE UP (ref 22–31)
CREAT SERPL-MCNC: 0.54 MG/DL — SIGNIFICANT CHANGE UP (ref 0.5–1.3)
CREAT SERPL-MCNC: 0.54 MG/DL — SIGNIFICANT CHANGE UP (ref 0.5–1.3)
EGFR: 103 ML/MIN/1.73M2 — SIGNIFICANT CHANGE UP
EGFR: 103 ML/MIN/1.73M2 — SIGNIFICANT CHANGE UP
GLUCOSE SERPL-MCNC: 103 MG/DL — HIGH (ref 70–99)
GLUCOSE SERPL-MCNC: 103 MG/DL — HIGH (ref 70–99)
HCT VFR BLD CALC: 43.4 % — SIGNIFICANT CHANGE UP (ref 34.5–45)
HCT VFR BLD CALC: 43.4 % — SIGNIFICANT CHANGE UP (ref 34.5–45)
HGB BLD-MCNC: 14.4 G/DL — SIGNIFICANT CHANGE UP (ref 11.5–15.5)
HGB BLD-MCNC: 14.4 G/DL — SIGNIFICANT CHANGE UP (ref 11.5–15.5)
MCHC RBC-ENTMCNC: 28.1 PG — SIGNIFICANT CHANGE UP (ref 27–34)
MCHC RBC-ENTMCNC: 28.1 PG — SIGNIFICANT CHANGE UP (ref 27–34)
MCHC RBC-ENTMCNC: 33.2 G/DL — SIGNIFICANT CHANGE UP (ref 32–36)
MCHC RBC-ENTMCNC: 33.2 G/DL — SIGNIFICANT CHANGE UP (ref 32–36)
MCV RBC AUTO: 84.8 FL — SIGNIFICANT CHANGE UP (ref 80–100)
MCV RBC AUTO: 84.8 FL — SIGNIFICANT CHANGE UP (ref 80–100)
NRBC # BLD: 0 /100 WBCS — SIGNIFICANT CHANGE UP (ref 0–0)
NRBC # BLD: 0 /100 WBCS — SIGNIFICANT CHANGE UP (ref 0–0)
PLATELET # BLD AUTO: 345 K/UL — SIGNIFICANT CHANGE UP (ref 150–400)
PLATELET # BLD AUTO: 345 K/UL — SIGNIFICANT CHANGE UP (ref 150–400)
POTASSIUM SERPL-MCNC: 4.1 MMOL/L — SIGNIFICANT CHANGE UP (ref 3.5–5.3)
POTASSIUM SERPL-MCNC: 4.1 MMOL/L — SIGNIFICANT CHANGE UP (ref 3.5–5.3)
POTASSIUM SERPL-SCNC: 4.1 MMOL/L — SIGNIFICANT CHANGE UP (ref 3.5–5.3)
POTASSIUM SERPL-SCNC: 4.1 MMOL/L — SIGNIFICANT CHANGE UP (ref 3.5–5.3)
RBC # BLD: 5.12 M/UL — SIGNIFICANT CHANGE UP (ref 3.8–5.2)
RBC # BLD: 5.12 M/UL — SIGNIFICANT CHANGE UP (ref 3.8–5.2)
RBC # FLD: 12.9 % — SIGNIFICANT CHANGE UP (ref 10.3–14.5)
RBC # FLD: 12.9 % — SIGNIFICANT CHANGE UP (ref 10.3–14.5)
SODIUM SERPL-SCNC: 141 MMOL/L — SIGNIFICANT CHANGE UP (ref 135–145)
SODIUM SERPL-SCNC: 141 MMOL/L — SIGNIFICANT CHANGE UP (ref 135–145)
WBC # BLD: 6.34 K/UL — SIGNIFICANT CHANGE UP (ref 3.8–10.5)
WBC # BLD: 6.34 K/UL — SIGNIFICANT CHANGE UP (ref 3.8–10.5)
WBC # FLD AUTO: 6.34 K/UL — SIGNIFICANT CHANGE UP (ref 3.8–10.5)
WBC # FLD AUTO: 6.34 K/UL — SIGNIFICANT CHANGE UP (ref 3.8–10.5)

## 2023-11-30 PROCEDURE — 99231 SBSQ HOSP IP/OBS SF/LOW 25: CPT

## 2023-11-30 RX ADMIN — GABAPENTIN 300 MILLIGRAM(S): 400 CAPSULE ORAL at 06:05

## 2023-11-30 RX ADMIN — ENOXAPARIN SODIUM 40 MILLIGRAM(S): 100 INJECTION SUBCUTANEOUS at 17:52

## 2023-11-30 RX ADMIN — Medication 100 MILLIGRAM(S): at 22:34

## 2023-11-30 RX ADMIN — Medication 100 MILLIGRAM(S): at 06:03

## 2023-11-30 RX ADMIN — GABAPENTIN 300 MILLIGRAM(S): 400 CAPSULE ORAL at 22:05

## 2023-11-30 RX ADMIN — GABAPENTIN 300 MILLIGRAM(S): 400 CAPSULE ORAL at 14:08

## 2023-11-30 RX ADMIN — POLYETHYLENE GLYCOL 3350 17 GRAM(S): 17 POWDER, FOR SOLUTION ORAL at 06:00

## 2023-11-30 RX ADMIN — Medication 100 MILLIGRAM(S): at 14:08

## 2023-11-30 NOTE — PROGRESS NOTE ADULT - ASSESSMENT
Tomasa Hooks is a 63 year old female with PMHx of multiple sclerosis who presented to the ED on 11/19/23 for complaints of generalized weakness and admitted for physical deconditioning, found to be influenza positive.  Now asymptomatic.      # Influenza infection - RVP positive.  Onset greater than 1 week ago.  Supportive care.  # Physical deconditioning, suspect secondary to viral illness.  OOB with PT.  PANDA placement  # Multiple sclerosis: receives ocrelizumab infusion every six months, last dose was almost six months ago, follows outpatient with neurology.  supportive care.  # Constipation - Resolved.  Decrease laxatives.  # Inpatient DVT Prophylaxis - Lovenox subcut    Pt still with poor ambulatory status.  Per CM, has emergency medicaid, no PANDA benefits.  Encourage OOB with PT, aides.  Per PT note 11/29, ambulated 20 side steps.    Eventual d/c home when safe discharge plan is clarified, d/w CM.

## 2023-12-01 PROCEDURE — 99231 SBSQ HOSP IP/OBS SF/LOW 25: CPT

## 2023-12-01 RX ADMIN — GABAPENTIN 300 MILLIGRAM(S): 400 CAPSULE ORAL at 05:32

## 2023-12-01 RX ADMIN — Medication 650 MILLIGRAM(S): at 19:05

## 2023-12-01 RX ADMIN — Medication 100 MILLIGRAM(S): at 22:44

## 2023-12-01 RX ADMIN — Medication 100 MILLIGRAM(S): at 15:41

## 2023-12-01 RX ADMIN — GABAPENTIN 300 MILLIGRAM(S): 400 CAPSULE ORAL at 22:45

## 2023-12-01 RX ADMIN — Medication 100 MILLIGRAM(S): at 05:33

## 2023-12-01 RX ADMIN — ENOXAPARIN SODIUM 40 MILLIGRAM(S): 100 INJECTION SUBCUTANEOUS at 18:32

## 2023-12-01 RX ADMIN — GABAPENTIN 300 MILLIGRAM(S): 400 CAPSULE ORAL at 15:41

## 2023-12-01 NOTE — PROGRESS NOTE ADULT - ASSESSMENT
Tomasa Hooks is a 63 year old female with PMHx of multiple sclerosis who presented to the ED on 11/19/23 for complaints of generalized weakness and admitted for physical deconditioning, found to be influenza positive.  Now asymptomatic.      # Influenza infection - RVP positive. on admission.  Supportive care, symptoms resolved.  # Physical deconditioning, suspect secondary to viral illness.  OOB with PT - recommended PANDA placement however, pt uninsured.  Plan for regular PT with ultimately d/c home with [facility].  Pt most recently ambulated 10' x 2.    # Multiple sclerosis: receives ocrelizumab infusion every six months, last dose was almost six months ago, follows outpatient with neurology.  supportive care.  # Constipation - Resolved.  d/c Laxatives as pt with watery stool.    # Inpatient DVT Prophylaxis - Lovenox subcut    Pt still with poor ambulatory status.  Per CM, has emergency medicaid, no PANDA benefits.  Encourage OOB with PT, aides.  Per PT note 11/30, ambulated 10' x2   Eventual d/c home with [facility] when safe discharge plan is clarified, d/w CM.

## 2023-12-02 PROCEDURE — 99232 SBSQ HOSP IP/OBS MODERATE 35: CPT

## 2023-12-02 RX ADMIN — GABAPENTIN 300 MILLIGRAM(S): 400 CAPSULE ORAL at 22:15

## 2023-12-02 RX ADMIN — GABAPENTIN 300 MILLIGRAM(S): 400 CAPSULE ORAL at 13:08

## 2023-12-02 RX ADMIN — Medication 100 MILLIGRAM(S): at 22:15

## 2023-12-02 RX ADMIN — Medication 100 MILLIGRAM(S): at 06:54

## 2023-12-02 RX ADMIN — GABAPENTIN 300 MILLIGRAM(S): 400 CAPSULE ORAL at 06:54

## 2023-12-02 RX ADMIN — ENOXAPARIN SODIUM 40 MILLIGRAM(S): 100 INJECTION SUBCUTANEOUS at 17:06

## 2023-12-02 RX ADMIN — Medication 100 MILLIGRAM(S): at 13:08

## 2023-12-02 NOTE — PROGRESS NOTE ADULT - SUBJECTIVE AND OBJECTIVE BOX
Patient: MERT NAIR [id number] 63y Female                            Hospitalist Attending Note    s/p multiple BMs, denies constipation.   Tolerating po intake    ____________________PHYSICAL EXAM:  GENERAL:  NAD Alert and Oriented x 3   HEENT: NCAT  CARDIOVASCULAR:  S1, S2  LUNGS: CTAB  ABDOMEN:  soft, (-) tenderness, (-) distension, (+) bowel sounds, (-) guarding, (-) rebound (-) rigidity  EXTREMITIES:  no cyanosis / clubbing / edema.   ____________________    VITALS:  Vital Signs Last 24 Hrs  T(C): 37.2 (26 Nov 2023 10:18), Max: 37.6 (26 Nov 2023 05:41)  T(F): 99 (26 Nov 2023 10:18), Max: 99.6 (26 Nov 2023 05:41)  HR: 70 (26 Nov 2023 10:18) (70 - 99)  BP: 107/71 (26 Nov 2023 10:18) (107/71 - 132/85)  BP(mean): --  RR: 17 (26 Nov 2023 10:18) (16 - 17)  SpO2: 97% (26 Nov 2023 10:18) (97% - 98%)    Parameters below as of 26 Nov 2023 10:18  Patient On (Oxygen Delivery Method): room air     Daily     Daily   CAPILLARY BLOOD GLUCOSE        I&O's Summary    25 Nov 2023 07:01  -  26 Nov 2023 07:00  --------------------------------------------------------  IN: 1320 mL / OUT: 1200 mL / NET: 120 mL        LABS:                        MEDICATIONS:  acetaminophen     Tablet .. 650 milliGRAM(s) Oral every 6 hours PRN  aluminum hydroxide/magnesium hydroxide/simethicone Suspension 30 milliLiter(s) Oral every 4 hours PRN  benzonatate 100 milliGRAM(s) Oral three times a day  bisacodyl 5 milliGRAM(s) Oral every 12 hours PRN  enoxaparin Injectable 40 milliGRAM(s) SubCutaneous every 24 hours  gabapentin 300 milliGRAM(s) Oral three times a day  influenza   Vaccine 0.5 milliLiter(s) IntraMuscular once  melatonin 3 milliGRAM(s) Oral at bedtime PRN  ondansetron Injectable 4 milliGRAM(s) IV Push every 8 hours PRN  polyethylene glycol 3350 17 Gram(s) Oral two times a day  senna 2 Tablet(s) Oral at bedtime    
                          Patient: MERT NAIR [id number] 63y Female                            Hospitalist Attending Note  + BM  no complaints.  No SOB / fever / chills / cough.   No Abdominal pain, nausea, vomiting, diarrhea, nor constipation.   Tolerating po intake    ____________________PHYSICAL EXAM:  GENERAL:  NAD Alert and Oriented x 3   HEENT: NCAT  CARDIOVASCULAR:  S1, S2  LUNGS: CTAB  ABDOMEN:  soft, (-) tenderness, (-) distension, (+) bowel sounds, (-) guarding, (-) rebound (-) rigidity  EXTREMITIES:  no cyanosis / clubbing / edema.   ____________________      VITALS:  Vital Signs Last 24 Hrs  T(C): 36.6 (24 Nov 2023 11:21), Max: 36.9 (23 Nov 2023 16:32)  T(F): 97.9 (24 Nov 2023 11:21), Max: 98.5 (23 Nov 2023 16:32)  HR: 102 (24 Nov 2023 11:21) (72 - 102)  BP: 109/77 (24 Nov 2023 11:21) (109/77 - 134/90)  BP(mean): --  RR: 17 (24 Nov 2023 11:21) (16 - 18)  SpO2: 96% (24 Nov 2023 11:21) (96% - 99%)    Parameters below as of 24 Nov 2023 11:21  Patient On (Oxygen Delivery Method): room air     Daily     Daily   CAPILLARY BLOOD GLUCOSE        I&O's Summary    23 Nov 2023 07:01  -  24 Nov 2023 07:00  --------------------------------------------------------  IN: 0 mL / OUT: 2110 mL / NET: -2110 mL    24 Nov 2023 07:01  -  24 Nov 2023 14:19  --------------------------------------------------------  IN: 250 mL / OUT: 0 mL / NET: 250 mL        LABS:                        14.9   3.87  )-----------( 234      ( 24 Nov 2023 06:40 )             45.8     11-24    138  |  105  |  10  ----------------------------<  110<H>  4.0   |  26  |  0.67    Ca    8.8      24 Nov 2023 06:40          Urinalysis Basic - ( 24 Nov 2023 06:40 )    Color: x / Appearance: x / SG: x / pH: x  Gluc: 110 mg/dL / Ketone: x  / Bili: x / Urobili: x   Blood: x / Protein: x / Nitrite: x   Leuk Esterase: x / RBC: x / WBC x   Sq Epi: x / Non Sq Epi: x / Bacteria: x              MEDICATIONS:  acetaminophen     Tablet .. 650 milliGRAM(s) Oral every 6 hours PRN  aluminum hydroxide/magnesium hydroxide/simethicone Suspension 30 milliLiter(s) Oral every 4 hours PRN  benzonatate 100 milliGRAM(s) Oral three times a day  bisacodyl 5 milliGRAM(s) Oral every 12 hours PRN  enoxaparin Injectable 40 milliGRAM(s) SubCutaneous every 24 hours  gabapentin 300 milliGRAM(s) Oral three times a day  influenza   Vaccine 0.5 milliLiter(s) IntraMuscular once  melatonin 3 milliGRAM(s) Oral at bedtime PRN  ondansetron Injectable 4 milliGRAM(s) IV Push every 8 hours PRN  polyethylene glycol 3350 17 Gram(s) Oral two times a day  senna 2 Tablet(s) Oral at bedtime    
                          Patient: MERT NAIR [id number] 63y Female                            Hospitalist Attending Note    No complaints.   No Abdominal pain, nausea, vomiting, diarrhea, nor constipation.   Tolerating po intake    ____________________PHYSICAL EXAM:  GENERAL:  NAD Alert and Oriented x 3   HEENT: NCAT  CARDIOVASCULAR:  S1, S2  LUNGS: CTAB  ABDOMEN:  soft, (-) tenderness, (-) distension, (+) bowel sounds, (-) guarding, (-) rebound (-) rigidity  EXTREMITIES:  no cyanosis / clubbing / edema.   ____________________    VITALS:  Vital Signs Last 24 Hrs  T(C): 37 (28 Nov 2023 11:20), Max: 37 (28 Nov 2023 11:20)  T(F): 98.6 (28 Nov 2023 11:20), Max: 98.6 (28 Nov 2023 11:20)  HR: 94 (28 Nov 2023 11:20) (84 - 99)  BP: 114/77 (28 Nov 2023 11:20) (114/77 - 124/81)  BP(mean): --  RR: 18 (28 Nov 2023 11:20) (18 - 20)  SpO2: 96% (28 Nov 2023 11:20) (96% - 98%)    Parameters below as of 28 Nov 2023 11:20  Patient On (Oxygen Delivery Method): room air     Daily     Daily   CAPILLARY BLOOD GLUCOSE        I&O's Summary    27 Nov 2023 07:01  -  28 Nov 2023 07:00  --------------------------------------------------------  IN: 480 mL / OUT: 401 mL / NET: 79 mL        LABS:                        13.6   6.80  )-----------( 314      ( 27 Nov 2023 06:47 )             42.6     11-27    139  |  107  |  14  ----------------------------<  113<H>  3.7   |  26  |  0.58    Ca    8.7      27 Nov 2023 06:47          Urinalysis Basic - ( 27 Nov 2023 06:47 )    Color: x / Appearance: x / SG: x / pH: x  Gluc: 113 mg/dL / Ketone: x  / Bili: x / Urobili: x   Blood: x / Protein: x / Nitrite: x   Leuk Esterase: x / RBC: x / WBC x   Sq Epi: x / Non Sq Epi: x / Bacteria: x              MEDICATIONS:  acetaminophen     Tablet .. 650 milliGRAM(s) Oral every 6 hours PRN  aluminum hydroxide/magnesium hydroxide/simethicone Suspension 30 milliLiter(s) Oral every 4 hours PRN  benzonatate 100 milliGRAM(s) Oral three times a day  bisacodyl 5 milliGRAM(s) Oral every 12 hours PRN  enoxaparin Injectable 40 milliGRAM(s) SubCutaneous every 24 hours  gabapentin 300 milliGRAM(s) Oral three times a day  influenza   Vaccine 0.5 milliLiter(s) IntraMuscular once  melatonin 3 milliGRAM(s) Oral at bedtime PRN  ondansetron Injectable 4 milliGRAM(s) IV Push every 8 hours PRN  polyethylene glycol 3350 17 Gram(s) Oral two times a day  senna 2 Tablet(s) Oral at bedtime    
                          Patient: MERT NAIR [id number] 63y Female                            Hospitalist Attending Note    No complaints.   No Abdominal pain, nausea, vomiting, diarrhea, nor constipation.   Tolerating po intake    ____________________PHYSICAL EXAM:  GENERAL:  NAD Alert and Oriented x 3   HEENT: NCAT  CARDIOVASCULAR:  S1, S2  LUNGS: CTAB  ABDOMEN:  soft, (-) tenderness, (-) distension, (+) bowel sounds, (-) guarding, (-) rebound (-) rigidity  EXTREMITIES:  no cyanosis / clubbing / edema.   ____________________    VITALS:  Vital Signs Last 24 Hrs  T(C): 37 (29 Nov 2023 11:00), Max: 37 (29 Nov 2023 11:00)  T(F): 98.6 (29 Nov 2023 11:00), Max: 98.6 (29 Nov 2023 11:00)  HR: 84 (29 Nov 2023 11:00) (60 - 94)  BP: 124/72 (29 Nov 2023 11:00) (102/67 - 124/72)  BP(mean): --  RR: 18 (29 Nov 2023 11:00) (17 - 18)  SpO2: 98% (29 Nov 2023 11:00) (94% - 98%)    Parameters below as of 29 Nov 2023 11:00  Patient On (Oxygen Delivery Method): room air     Daily     Daily   CAPILLARY BLOOD GLUCOSE        I&O's Summary    28 Nov 2023 07:01  -  29 Nov 2023 07:00  --------------------------------------------------------  IN: 0 mL / OUT: 1500 mL / NET: -1500 mL        LABS:                        MEDICATIONS:  acetaminophen     Tablet .. 650 milliGRAM(s) Oral every 6 hours PRN  aluminum hydroxide/magnesium hydroxide/simethicone Suspension 30 milliLiter(s) Oral every 4 hours PRN  benzonatate 100 milliGRAM(s) Oral three times a day  bisacodyl 5 milliGRAM(s) Oral every 12 hours PRN  enoxaparin Injectable 40 milliGRAM(s) SubCutaneous every 24 hours  gabapentin 300 milliGRAM(s) Oral three times a day  influenza   Vaccine 0.5 milliLiter(s) IntraMuscular once  melatonin 3 milliGRAM(s) Oral at bedtime PRN  ondansetron Injectable 4 milliGRAM(s) IV Push every 8 hours PRN  polyethylene glycol 3350 17 Gram(s) Oral two times a day  senna 2 Tablet(s) Oral at bedtime    
                          Patient: MERT NAIR [id number] 63y Female                            Hospitalist Attending Note    No complaints.   No Abdominal pain, nausea, vomiting, diarrhea, nor constipation.   Tolerating po intake    ____________________PHYSICAL EXAM:  GENERAL:  NAD Alert and Oriented x 3   HEENT: NCAT  CARDIOVASCULAR:  S1, S2  LUNGS: CTAB  ABDOMEN:  soft, (-) tenderness, (-) distension, (+) bowel sounds, (-) guarding, (-) rebound (-) rigidity  EXTREMITIES:  no cyanosis / clubbing / edema.   ____________________    VITALS:  Vital Signs Last 24 Hrs  T(C): 36.5 (30 Nov 2023 10:38), Max: 37.2 (30 Nov 2023 00:08)  T(F): 97.7 (30 Nov 2023 10:38), Max: 98.9 (30 Nov 2023 00:08)  HR: 94 (30 Nov 2023 10:38) (77 - 94)  BP: 115/76 (30 Nov 2023 10:38) (102/64 - 119/77)  BP(mean): --  RR: 17 (30 Nov 2023 10:38) (16 - 19)  SpO2: 91% (30 Nov 2023 10:38) (91% - 99%)    Parameters below as of 30 Nov 2023 10:38  Patient On (Oxygen Delivery Method): room air     Daily     Daily   CAPILLARY BLOOD GLUCOSE        I&O's Summary    29 Nov 2023 07:01  -  30 Nov 2023 07:00  --------------------------------------------------------  IN: 420 mL / OUT: 1500 mL / NET: -1080 mL        LABS:                        14.4   6.34  )-----------( 345      ( 30 Nov 2023 06:21 )             43.4     11-30    141  |  108  |  13  ----------------------------<  103<H>  4.1   |  26  |  0.54    Ca    8.9      30 Nov 2023 06:21          Urinalysis Basic - ( 30 Nov 2023 06:21 )    Color: x / Appearance: x / SG: x / pH: x  Gluc: 103 mg/dL / Ketone: x  / Bili: x / Urobili: x   Blood: x / Protein: x / Nitrite: x   Leuk Esterase: x / RBC: x / WBC x   Sq Epi: x / Non Sq Epi: x / Bacteria: x              MEDICATIONS:  acetaminophen     Tablet .. 650 milliGRAM(s) Oral every 6 hours PRN  aluminum hydroxide/magnesium hydroxide/simethicone Suspension 30 milliLiter(s) Oral every 4 hours PRN  benzonatate 100 milliGRAM(s) Oral three times a day  bisacodyl 5 milliGRAM(s) Oral every 12 hours PRN  enoxaparin Injectable 40 milliGRAM(s) SubCutaneous every 24 hours  gabapentin 300 milliGRAM(s) Oral three times a day  influenza   Vaccine 0.5 milliLiter(s) IntraMuscular once  melatonin 3 milliGRAM(s) Oral at bedtime PRN  ondansetron Injectable 4 milliGRAM(s) IV Push every 8 hours PRN  polyethylene glycol 3350 17 Gram(s) Oral two times a day  senna 2 Tablet(s) Oral at bedtime    
no events     MEDICATIONS  (STANDING):  benzonatate 100 milliGRAM(s) Oral three times a day  enoxaparin Injectable 40 milliGRAM(s) SubCutaneous every 24 hours  gabapentin 300 milliGRAM(s) Oral three times a day  influenza   Vaccine 0.5 milliLiter(s) IntraMuscular once    MEDICATIONS  (PRN):  acetaminophen     Tablet .. 650 milliGRAM(s) Oral every 6 hours PRN Temp greater or equal to 38C (100.4F), Mild Pain (1 - 3)  aluminum hydroxide/magnesium hydroxide/simethicone Suspension 30 milliLiter(s) Oral every 4 hours PRN Dyspepsia  bisacodyl 5 milliGRAM(s) Oral every 12 hours PRN Constipation  melatonin 3 milliGRAM(s) Oral at bedtime PRN Insomnia  ondansetron Injectable 4 milliGRAM(s) IV Push every 8 hours PRN Nausea and/or Vomiting    Vital Signs Last 24 Hrs  T(C): 36.9 (02 Dec 2023 11:13), Max: 37.7 (02 Dec 2023 01:08)  T(F): 98.4 (02 Dec 2023 11:13), Max: 99.8 (02 Dec 2023 01:08)  HR: 84 (02 Dec 2023 11:13) (78 - 87)  BP: 136/77 (02 Dec 2023 11:13) (111/72 - 136/77)  BP(mean): --  RR: 18 (02 Dec 2023 11:13) (18 - 19)  SpO2: 97% (02 Dec 2023 11:13) (97% - 98%)    Parameters below as of 02 Dec 2023 11:13  Patient On (Oxygen Delivery Method): room air        ____________________PHYSICAL EXAM:  GENERAL:  NAD Alert and Oriented x 3   HEENT: NCAT  CARDIOVASCULAR:  S1, S2  LUNGS: CTAB  ABDOMEN:  soft, (-) tenderness, (-) distension, (+) bowel sounds, (-) guarding, (-) rebound (-) rigidity  EXTREMITIES:  no cyanosis / clubbing / edema.   ____________________        LABS:                        14.4   6.34  )-----------( 345      ( 30 Nov 2023 06:21 )             43.4     11-30    141  |  108  |  13  ----------------------------<  103<H>  4.1   |  26  |  0.54    Ca    8.9      30 Nov 2023 06:21          Urinalysis Basic - ( 30 Nov 2023 06:21 )    Color: x / Appearance: x / SG: x / pH: x  Gluc: 103 mg/dL / Ketone: x  / Bili: x / Urobili: x   Blood: x / Protein: x / Nitrite: x   Leuk Esterase: x / RBC: x / WBC x   Sq Epi: x / Non Sq Epi: x / Bacteria: x              MEDICATIONS:  acetaminophen     Tablet .. 650 milliGRAM(s) Oral every 6 hours PRN  aluminum hydroxide/magnesium hydroxide/simethicone Suspension 30 milliLiter(s) Oral every 4 hours PRN  benzonatate 100 milliGRAM(s) Oral three times a day  bisacodyl 5 milliGRAM(s) Oral every 12 hours PRN  enoxaparin Injectable 40 milliGRAM(s) SubCutaneous every 24 hours  gabapentin 300 milliGRAM(s) Oral three times a day  influenza   Vaccine 0.5 milliLiter(s) IntraMuscular once  melatonin 3 milliGRAM(s) Oral at bedtime PRN  ondansetron Injectable 4 milliGRAM(s) IV Push every 8 hours PRN    
                          Patient: MERT NAIR [id number] 63y Female                            Hospitalist Attending Note    C/o constipation.  No Abdominal pain, nausea, vomiting  No fever / chills / pain.     ____________________PHYSICAL EXAM:  GENERAL:  NAD Alert and Oriented x 3   HEENT: NCAT  CARDIOVASCULAR:  S1, S2  LUNGS: CTAB  ABDOMEN:  soft, (-) tenderness, (-) distension, (+) bowel sounds, (-) guarding, (-) rebound (-) rigidity  EXTREMITIES:  no cyanosis / clubbing / edema.   ____________________         VITALS:  Vital Signs Last 24 Hrs  T(C): 36.6 (22 Nov 2023 11:21), Max: 36.8 (21 Nov 2023 23:55)  T(F): 97.8 (22 Nov 2023 11:21), Max: 98.2 (21 Nov 2023 23:55)  HR: 85 (22 Nov 2023 15:10) (81 - 96)  BP: 120/80 (22 Nov 2023 15:10) (110/72 - 128/88)  BP(mean): --  RR: 17 (22 Nov 2023 11:21) (17 - 18)  SpO2: 97% (22 Nov 2023 15:10) (97% - 98%)    Parameters below as of 22 Nov 2023 15:10  Patient On (Oxygen Delivery Method): room air     Daily     Daily   CAPILLARY BLOOD GLUCOSE        I&O's Summary    21 Nov 2023 07:01  -  22 Nov 2023 07:00  --------------------------------------------------------  IN: 800 mL / OUT: 950 mL / NET: -150 mL        HISTORY:  PAST MEDICAL & SURGICAL HISTORY:  MS (multiple sclerosis)      Allergies    penicillin (Hives)    Intolerances       LABS:      Culture - Urine (collected 19 Nov 2023 18:07)  Source: Clean Catch Clean Catch (Midstream)  Final Report (21 Nov 2023 08:24):    >=3 organisms. Probable collection contamination.                    Culture - Urine (collected 19 Nov 2023 18:07)  Source: Clean Catch Clean Catch (Midstream)  Final Report (21 Nov 2023 08:24):    >=3 organisms. Probable collection contamination.          MEDICATIONS:  MEDICATIONS  (STANDING):  benzonatate 100 milliGRAM(s) Oral three times a day  gabapentin 300 milliGRAM(s) Oral three times a day  influenza   Vaccine 0.5 milliLiter(s) IntraMuscular once  polyethylene glycol 3350 17 Gram(s) Oral two times a day  senna 2 Tablet(s) Oral at bedtime    MEDICATIONS  (PRN):  acetaminophen     Tablet .. 650 milliGRAM(s) Oral every 6 hours PRN Temp greater or equal to 38C (100.4F), Mild Pain (1 - 3)  aluminum hydroxide/magnesium hydroxide/simethicone Suspension 30 milliLiter(s) Oral every 4 hours PRN Dyspepsia  bisacodyl 5 milliGRAM(s) Oral every 12 hours PRN Constipation  melatonin 3 milliGRAM(s) Oral at bedtime PRN Insomnia  ondansetron Injectable 4 milliGRAM(s) IV Push every 8 hours PRN Nausea and/or Vomiting  
                          Patient: MERT NAIR [id number] 63y Female                            Hospitalist Attending Note    C/o constipation x 4 days.  + flatus  No Abdominal pain, nausea, vomiting  No fever / chills / pain.     ____________________PHYSICAL EXAM:  GENERAL:  NAD Alert and Oriented x 3   HEENT: NCAT  CARDIOVASCULAR:  S1, S2  LUNGS: CTAB  ABDOMEN:  soft, (-) tenderness, (-) distension, (+) bowel sounds, (-) guarding, (-) rebound (-) rigidity  EXTREMITIES:  no cyanosis / clubbing / edema.   ____________________      VITALS:  Vital Signs Last 24 Hrs  T(C): 36.7 (23 Nov 2023 11:10), Max: 37.5 (22 Nov 2023 23:41)  T(F): 98 (23 Nov 2023 11:10), Max: 99.5 (22 Nov 2023 23:41)  HR: 102 (23 Nov 2023 11:10) (85 - 102)  BP: 120/78 (23 Nov 2023 11:10) (105/72 - 125/77)  BP(mean): --  RR: 17 (23 Nov 2023 11:10) (17 - 18)  SpO2: 97% (23 Nov 2023 11:10) (97% - 99%)    Parameters below as of 23 Nov 2023 11:10  Patient On (Oxygen Delivery Method): room air     Daily     Daily   CAPILLARY BLOOD GLUCOSE        I&O's Summary    22 Nov 2023 07:01  -  23 Nov 2023 07:00  --------------------------------------------------------  IN: 0 mL / OUT: 900 mL / NET: -900 mL        LABS:                        14.7   3.85  )-----------( 210      ( 23 Nov 2023 06:50 )             45.7     11-23    137  |  104  |  14  ----------------------------<  108<H>  5.0   |  29  |  0.70    Ca    9.1      23 Nov 2023 06:50          Urinalysis Basic - ( 23 Nov 2023 06:50 )    Color: x / Appearance: x / SG: x / pH: x  Gluc: 108 mg/dL / Ketone: x  / Bili: x / Urobili: x   Blood: x / Protein: x / Nitrite: x   Leuk Esterase: x / RBC: x / WBC x   Sq Epi: x / Non Sq Epi: x / Bacteria: x              MEDICATIONS:  acetaminophen     Tablet .. 650 milliGRAM(s) Oral every 6 hours PRN  aluminum hydroxide/magnesium hydroxide/simethicone Suspension 30 milliLiter(s) Oral every 4 hours PRN  benzonatate 100 milliGRAM(s) Oral three times a day  bisacodyl 5 milliGRAM(s) Oral every 12 hours PRN  enoxaparin Injectable 40 milliGRAM(s) SubCutaneous every 24 hours  gabapentin 300 milliGRAM(s) Oral three times a day  influenza   Vaccine 0.5 milliLiter(s) IntraMuscular once  melatonin 3 milliGRAM(s) Oral at bedtime PRN  ondansetron Injectable 4 milliGRAM(s) IV Push every 8 hours PRN  polyethylene glycol 3350 17 Gram(s) Oral two times a day  senna 2 Tablet(s) Oral at bedtime    
Patient is a 63y old  Female who presents with a chief complaint of Physical deconditioning (2023 12:55)    INTERVAL HPI/OVERNIGHT EVENTS:    MEDICATIONS  (STANDING):  benzonatate 100 milliGRAM(s) Oral three times a day  gabapentin 300 milliGRAM(s) Oral three times a day  influenza   Vaccine 0.5 milliLiter(s) IntraMuscular once    MEDICATIONS  (PRN):  acetaminophen     Tablet .. 650 milliGRAM(s) Oral every 6 hours PRN Temp greater or equal to 38C (100.4F), Mild Pain (1 - 3)  aluminum hydroxide/magnesium hydroxide/simethicone Suspension 30 milliLiter(s) Oral every 4 hours PRN Dyspepsia  melatonin 3 milliGRAM(s) Oral at bedtime PRN Insomnia  ondansetron Injectable 4 milliGRAM(s) IV Push every 8 hours PRN Nausea and/or Vomiting    Allergies    penicillin (Hives)    Intolerances      REVIEW OF SYSTEMS:  All other systems reviewed and are negative    Vital Signs Last 24 Hrs  T(C): 36.6 (2023 05:17), Max: 37.1 (2023 23:50)  T(F): 97.9 (2023 05:17), Max: 98.8 (2023 23:50)  HR: 86 (2023 05:17) (70 - 106)  BP: 137/70 (2023 05:17) (108/71 - 137/70)  BP(mean): --  RR: 16 (2023 05:17) (16 - 18)  SpO2: 98% (2023 05:17) (94% - 98%)    Parameters below as of 2023 05:17  Patient On (Oxygen Delivery Method): room air      Daily     Daily Weight in k.8 (2023 05:17)  I&O's Summary    2023 07:01  -  2023 07:00  --------------------------------------------------------  IN: 480 mL / OUT: 650 mL / NET: -170 mL      CAPILLARY BLOOD GLUCOSE        PHYSICAL EXAM:  GENERAL: NAD,    HEAD:  Atraumatic, Normocephalic  EYES: EOMI, PERRLA, conjunctiva and sclera clear  ENMT: No tonsillar erythema, exudates, or enlargement; Moist mucous membranes, Good dentition, No lesions  NECK: Supple, No JVD, Normal thyroid  NERVOUS SYSTEM:  Alert & Oriented X3, Good concentration; Motor Strength 5/5 B/L upper and lower extremities; DTRs 2+ intact and symmetric  CHEST/LUNG: Clear to percussion bilaterally; No rales, rhonchi, wheezing, or rubs  HEART: Regular rate and rhythm; No murmurs, rubs, or gallops  ABDOMEN: Soft, Nontender, Nondistended; Bowel sounds present  EXTREMITIES:  2+ Peripheral Pulses, No clubbing, cyanosis, or edema  LYMPH: No lymphadenopathy noted  SKIN: No rashes or lesions    Labs                          15.8   3.98  )-----------( 206      ( 2023 13:20 )             48.1         138  |  107  |  18  ----------------------------<  102<H>  4.7   |  25  |  0.76    Ca    9.1      2023 13:20    TPro  8.0  /  Alb  3.7  /  TBili  0.9  /  DBili  x   /  AST  53<H>  /  ALT  59  /  AlkPhos  109            Urinalysis Basic - ( 2023 18:07 )    Color: Dark Yellow / Appearance: Cloudy / S.027 / pH: x  Gluc: x / Ketone: 80 mg/dL  / Bili: Negative / Urobili: 1.0 mg/dL   Blood: x / Protein: 30 mg/dL / Nitrite: Negative   Leuk Esterase: Negative / RBC: Negative /HPF / WBC 0-2 /HPF   Sq Epi: x / Non Sq Epi: x / Bacteria: Many /HPF        Culture - Urine (collected 2023 18:07)  Source: Clean Catch Clean Catch (Midstream)  Final Report (2023 08:24):    >=3 organisms. Probable collection contamination.                DVT prophylaxis: > Lovenox 40mg SQ daily  > Heparin   > SCD's
                          Patient: MERT NAIR [id number] 63y Female                            Hospitalist Attending Note    Sister oLre at bedside.  No complaints.   No Abdominal pain, nausea, vomiting, diarrhea, nor constipation.   Tolerating po intake    ____________________PHYSICAL EXAM:  GENERAL:  NAD Alert and Oriented x 3   HEENT: NCAT  CARDIOVASCULAR:  S1, S2  LUNGS: CTAB  ABDOMEN:  soft, (-) tenderness, (-) distension, (+) bowel sounds, (-) guarding, (-) rebound (-) rigidity  EXTREMITIES:  no cyanosis / clubbing / edema.   ____________________    VITALS:  Vital Signs Last 24 Hrs  T(C): 36.9 (27 Nov 2023 11:47), Max: 37.1 (26 Nov 2023 17:24)  T(F): 98.4 (27 Nov 2023 11:47), Max: 98.7 (26 Nov 2023 17:24)  HR: 92 (27 Nov 2023 11:47) (81 - 92)  BP: 105/66 (27 Nov 2023 11:47) (105/66 - 115/77)  BP(mean): --  RR: 17 (27 Nov 2023 11:47) (17 - 17)  SpO2: 98% (27 Nov 2023 11:47) (98% - 100%)    Parameters below as of 27 Nov 2023 11:47  Patient On (Oxygen Delivery Method): room air     Daily     Daily   CAPILLARY BLOOD GLUCOSE        I&O's Summary    26 Nov 2023 07:01  -  27 Nov 2023 07:00  --------------------------------------------------------  IN: 450 mL / OUT: 550 mL / NET: -100 mL        LABS:                        13.6   6.80  )-----------( 314      ( 27 Nov 2023 06:47 )             42.6     11-27    139  |  107  |  14  ----------------------------<  113<H>  3.7   |  26  |  0.58    Ca    8.7      27 Nov 2023 06:47          Urinalysis Basic - ( 27 Nov 2023 06:47 )    Color: x / Appearance: x / SG: x / pH: x  Gluc: 113 mg/dL / Ketone: x  / Bili: x / Urobili: x   Blood: x / Protein: x / Nitrite: x   Leuk Esterase: x / RBC: x / WBC x   Sq Epi: x / Non Sq Epi: x / Bacteria: x              MEDICATIONS:  acetaminophen     Tablet .. 650 milliGRAM(s) Oral every 6 hours PRN  aluminum hydroxide/magnesium hydroxide/simethicone Suspension 30 milliLiter(s) Oral every 4 hours PRN  benzonatate 100 milliGRAM(s) Oral three times a day  bisacodyl 5 milliGRAM(s) Oral every 12 hours PRN  enoxaparin Injectable 40 milliGRAM(s) SubCutaneous every 24 hours  gabapentin 300 milliGRAM(s) Oral three times a day  influenza   Vaccine 0.5 milliLiter(s) IntraMuscular once  melatonin 3 milliGRAM(s) Oral at bedtime PRN  ondansetron Injectable 4 milliGRAM(s) IV Push every 8 hours PRN  polyethylene glycol 3350 17 Gram(s) Oral two times a day  senna 2 Tablet(s) Oral at bedtime    
                          Patient: MERT NAIR [id number] 63y Female                            Hospitalist Attending Note    No complaints.   No Abdominal pain, nausea, vomiting, diarrhea, nor constipation.   Tolerating po intake    ____________________PHYSICAL EXAM:  GENERAL:  NAD Alert and Oriented x 3   HEENT: NCAT  CARDIOVASCULAR:  S1, S2  LUNGS: CTAB  ABDOMEN:  soft, (-) tenderness, (-) distension, (+) bowel sounds, (-) guarding, (-) rebound (-) rigidity  EXTREMITIES:  no cyanosis / clubbing / edema.   ____________________    VITALS:  Vital Signs Last 24 Hrs  T(C): 36.3 (01 Dec 2023 11:58), Max: 37.1 (30 Nov 2023 17:17)  T(F): 97.3 (01 Dec 2023 11:58), Max: 98.8 (30 Nov 2023 17:17)  HR: 85 (01 Dec 2023 11:58) (63 - 99)  BP: 103/65 (01 Dec 2023 11:58) (103/65 - 115/77)  BP(mean): --  RR: 18 (01 Dec 2023 11:58) (17 - 18)  SpO2: 95% (01 Dec 2023 11:58) (95% - 98%)    Parameters below as of 01 Dec 2023 11:58  Patient On (Oxygen Delivery Method): room air     Daily     Daily   CAPILLARY BLOOD GLUCOSE        I&O's Summary    30 Nov 2023 07:01  -  01 Dec 2023 07:00  --------------------------------------------------------  IN: 2880 mL / OUT: 2801 mL / NET: 79 mL        LABS:                        14.4   6.34  )-----------( 345      ( 30 Nov 2023 06:21 )             43.4     11-30    141  |  108  |  13  ----------------------------<  103<H>  4.1   |  26  |  0.54    Ca    8.9      30 Nov 2023 06:21          Urinalysis Basic - ( 30 Nov 2023 06:21 )    Color: x / Appearance: x / SG: x / pH: x  Gluc: 103 mg/dL / Ketone: x  / Bili: x / Urobili: x   Blood: x / Protein: x / Nitrite: x   Leuk Esterase: x / RBC: x / WBC x   Sq Epi: x / Non Sq Epi: x / Bacteria: x              MEDICATIONS:  acetaminophen     Tablet .. 650 milliGRAM(s) Oral every 6 hours PRN  aluminum hydroxide/magnesium hydroxide/simethicone Suspension 30 milliLiter(s) Oral every 4 hours PRN  benzonatate 100 milliGRAM(s) Oral three times a day  bisacodyl 5 milliGRAM(s) Oral every 12 hours PRN  enoxaparin Injectable 40 milliGRAM(s) SubCutaneous every 24 hours  gabapentin 300 milliGRAM(s) Oral three times a day  influenza   Vaccine 0.5 milliLiter(s) IntraMuscular once  melatonin 3 milliGRAM(s) Oral at bedtime PRN  ondansetron Injectable 4 milliGRAM(s) IV Push every 8 hours PRN    
                          Patient: MERT NAIR [id number] 63y Female                            Hospitalist Attending Note  no complaints.  No SOB / fever / chills / cough.   No Abdominal pain, nausea, vomiting, diarrhea, nor constipation.   Tolerating po intake    ____________________PHYSICAL EXAM:  GENERAL:  NAD Alert and Oriented x 3   HEENT: NCAT  CARDIOVASCULAR:  S1, S2  LUNGS: CTAB  ABDOMEN:  soft, (-) tenderness, (-) distension, (+) bowel sounds, (-) guarding, (-) rebound (-) rigidity  EXTREMITIES:  no cyanosis / clubbing / edema.   ____________________    VITALS:  Vital Signs Last 24 Hrs  T(C): 36.5 (25 Nov 2023 11:04), Max: 37.1 (24 Nov 2023 23:32)  T(F): 97.7 (25 Nov 2023 11:04), Max: 98.8 (24 Nov 2023 23:32)  HR: 90 (25 Nov 2023 11:04) (78 - 90)  BP: 118/76 (25 Nov 2023 11:04) (111/74 - 129/74)  BP(mean): --  RR: 18 (25 Nov 2023 11:04) (16 - 18)  SpO2: 100% (25 Nov 2023 11:04) (98% - 100%)    Parameters below as of 25 Nov 2023 11:04  Patient On (Oxygen Delivery Method): room air     Daily     Daily   CAPILLARY BLOOD GLUCOSE        I&O's Summary    24 Nov 2023 07:01  -  25 Nov 2023 07:00  --------------------------------------------------------  IN: 730 mL / OUT: 1625 mL / NET: -895 mL        LABS:                        14.9   3.87  )-----------( 234      ( 24 Nov 2023 06:40 )             45.8     11-24    138  |  105  |  10  ----------------------------<  110<H>  4.0   |  26  |  0.67    Ca    8.8      24 Nov 2023 06:40          Urinalysis Basic - ( 24 Nov 2023 06:40 )    Color: x / Appearance: x / SG: x / pH: x  Gluc: 110 mg/dL / Ketone: x  / Bili: x / Urobili: x   Blood: x / Protein: x / Nitrite: x   Leuk Esterase: x / RBC: x / WBC x   Sq Epi: x / Non Sq Epi: x / Bacteria: x              MEDICATIONS:  acetaminophen     Tablet .. 650 milliGRAM(s) Oral every 6 hours PRN  aluminum hydroxide/magnesium hydroxide/simethicone Suspension 30 milliLiter(s) Oral every 4 hours PRN  benzonatate 100 milliGRAM(s) Oral three times a day  bisacodyl 5 milliGRAM(s) Oral every 12 hours PRN  enoxaparin Injectable 40 milliGRAM(s) SubCutaneous every 24 hours  gabapentin 300 milliGRAM(s) Oral three times a day  influenza   Vaccine 0.5 milliLiter(s) IntraMuscular once  lactulose Syrup 10 Gram(s) Oral daily  melatonin 3 milliGRAM(s) Oral at bedtime PRN  ondansetron Injectable 4 milliGRAM(s) IV Push every 8 hours PRN  polyethylene glycol 3350 17 Gram(s) Oral two times a day  saline laxative (FLEET) Rectal Enema 1 Enema Rectal once  senna 2 Tablet(s) Oral at bedtime    
Patient is a 63y old  Female who presents with a chief complaint of Physical deconditioning (2023 17:35)    INTERVAL HPI/OVERNIGHT EVENTS:    MEDICATIONS  (STANDING):  benzonatate 100 milliGRAM(s) Oral three times a day  gabapentin 300 milliGRAM(s) Oral three times a day  influenza   Vaccine 0.5 milliLiter(s) IntraMuscular once    MEDICATIONS  (PRN):  acetaminophen     Tablet .. 650 milliGRAM(s) Oral every 6 hours PRN Temp greater or equal to 38C (100.4F), Mild Pain (1 - 3)  aluminum hydroxide/magnesium hydroxide/simethicone Suspension 30 milliLiter(s) Oral every 4 hours PRN Dyspepsia  melatonin 3 milliGRAM(s) Oral at bedtime PRN Insomnia  ondansetron Injectable 4 milliGRAM(s) IV Push every 8 hours PRN Nausea and/or Vomiting    Allergies    penicillin (Hives)    Intolerances      REVIEW OF SYSTEMS:  All other systems reviewed and are negative    Vital Signs Last 24 Hrs  T(C): 36.7 (2023 11:21), Max: 36.9 (2023 10:53)  T(F): 98.1 (2023 11:21), Max: 98.5 (2023 10:53)  HR: 70 (2023 11:21) (70 - 106)  BP: 133/76 (2023 11:21) (113/75 - 133/76)  BP(mean): --  RR: 18 (2023 11:21) (17 - 18)  SpO2: 94% (2023 11:21) (94% - 98%)    Parameters below as of 2023 11:21  Patient On (Oxygen Delivery Method): room air      Daily     Daily Weight in k.8 (2023 05:06)  I&O's Summary    2023 07:01  -  2023 07:00  --------------------------------------------------------  IN: 0 mL / OUT: 500 mL / NET: -500 mL      CAPILLARY BLOOD GLUCOSE        PHYSICAL EXAM:  GENERAL: NAD,    HEAD:  Atraumatic, Normocephalic  EYES: EOMI, PERRLA, conjunctiva and sclera clear  ENMT: No tonsillar erythema, exudates, or enlargement; Moist mucous membranes, Good dentition, No lesions  NECK: Supple, No JVD, Normal thyroid  NERVOUS SYSTEM:  Alert & Oriented X3, Good concentration; Motor Strength 5/5 B/L upper and lower extremities; DTRs 2+ intact and symmetric  CHEST/LUNG: Clear to percussion bilaterally; No rales, rhonchi, wheezing, or rubs  HEART: Regular rate and rhythm; No murmurs, rubs, or gallops  ABDOMEN: Soft, Nontender, Nondistended; Bowel sounds present  EXTREMITIES:  2+ Peripheral Pulses, No clubbing, cyanosis, or edema  LYMPH: No lymphadenopathy noted  SKIN: No rashes or lesions    Labs                          15.8   3.98  )-----------( 206      ( 2023 13:20 )             48.1         138  |  107  |  18  ----------------------------<  102<H>  4.7   |  25  |  0.76    Ca    9.1      2023 13:20    TPro  8.0  /  Alb  3.7  /  TBili  0.9  /  DBili  x   /  AST  53<H>  /  ALT  59  /  AlkPhos  109            Urinalysis Basic - ( 2023 18:07 )    Color: Dark Yellow / Appearance: Cloudy / S.027 / pH: x  Gluc: x / Ketone: 80 mg/dL  / Bili: Negative / Urobili: 1.0 mg/dL   Blood: x / Protein: 30 mg/dL / Nitrite: Negative   Leuk Esterase: Negative / RBC: Negative /HPF / WBC 0-2 /HPF   Sq Epi: x / Non Sq Epi: x / Bacteria: Many /HPF                  DVT prophylaxis: > Lovenox 40mg SQ daily  > Heparin   > SCD's

## 2023-12-02 NOTE — PROGRESS NOTE ADULT - PROVIDER SPECIALTY LIST ADULT
Internal Medicine
Hospitalist
Internal Medicine
Hospitalist

## 2023-12-02 NOTE — PROGRESS NOTE ADULT - ASSESSMENT
Tomasa Hooks is a 63 year old female with PMHx of multiple sclerosis who presented to the ED on 11/19/23 for complaints of generalized weakness and admitted for physical deconditioning, found to be influenza positive.  Now asymptomatic.      # Influenza infection - RVP positive. on admission.  Supportive care, symptoms resolved.  # Physical deconditioning, suspect secondary to viral illness.  OOB with PT - recommended [facility] placement however, pt uninsured.  Plan for regular PT with ultimately d/c home [facility].  Pt most recently ambulated 10' x 2.    # Multiple sclerosis: receives ocrelizumab infusion every six months, last dose was almost six months ago, follows outpatient with neurology.  supportive care.  # Constipation - Resolved.  d/c Laxatives as pt with watery stool.    # Inpatient DVT Prophylaxis - Lovenox subcut      Encourage OOB with PT, aides.     d/c home [facility] tomorrow. pt has an aid and lives with sister who will help with ADLs

## 2023-12-02 NOTE — PROGRESS NOTE ADULT - REASON FOR ADMISSION
Physical deconditioning

## 2023-12-03 VITALS
OXYGEN SATURATION: 98 % | DIASTOLIC BLOOD PRESSURE: 57 MMHG | SYSTOLIC BLOOD PRESSURE: 108 MMHG | RESPIRATION RATE: 18 BRPM | HEART RATE: 88 BPM | TEMPERATURE: 98 F

## 2023-12-03 PROCEDURE — 99239 HOSP IP/OBS DSCHRG MGMT >30: CPT

## 2023-12-03 RX ADMIN — Medication 100 MILLIGRAM(S): at 05:52

## 2023-12-03 RX ADMIN — ENOXAPARIN SODIUM 40 MILLIGRAM(S): 100 INJECTION SUBCUTANEOUS at 18:04

## 2023-12-03 RX ADMIN — Medication 100 MILLIGRAM(S): at 14:33

## 2023-12-03 RX ADMIN — GABAPENTIN 300 MILLIGRAM(S): 400 CAPSULE ORAL at 14:33

## 2023-12-03 RX ADMIN — GABAPENTIN 300 MILLIGRAM(S): 400 CAPSULE ORAL at 05:51

## 2023-12-03 NOTE — DISCHARGE NOTE PROVIDER - HOSPITAL COURSE
Tomasa Hooks is a 63 year old female with PMHx of multiple sclerosis who presented to the ED on 11/19/23 for complaints of generalized weakness and admitted for physical deconditioning, found to be influenza positive.  Now asymptomatic.      # Influenza infection - RVP positive. on admission.  Supportive care, symptoms resolved.  # Physical deconditioning, suspect secondary to viral illness.  OOB with PT - recommended [facility] placement however, pt uninsured.  Plan for regular PT with ultimately d/c home [facility].  Pt most recently ambulated 10' x 2.    # Multiple sclerosis: receives ocrelizumab infusion every six months, last dose was almost six months ago, follows outpatient with neurology.  supportive care.  # Constipation - Resolved.  d/c Laxatives as pt with watery stool.    # Inpatient DVT Prophylaxis - Lovenox subcut      Encourage OOB with PT, aides.     [facility] home [facility]. pt has an aid and lives with sister who will help with

## 2023-12-03 NOTE — DISCHARGE NOTE NURSING/CASE MANAGEMENT/SOCIAL WORK - PATIENT PORTAL LINK FT
You can access the FollowMyHealth Patient Portal offered by [facility] by registering at the following website: [email or web address]. By joining [facility]’s FollowMyHealth portal, you will also be able to view your health information using other applications (apps) compatible with our system. You can access the FollowMyHealth Patient Portal offered by [facility] by registering at the following website: [email or web address]. By joining [employer] [facility]’s FollowMyHealth portal, you will also be able to view your health information using other applications (apps) compatible with our system.

## 2023-12-03 NOTE — DISCHARGE NOTE PROVIDER - NSDCCPCAREPLAN_GEN_ALL_CORE_FT
PRINCIPAL DISCHARGE DIAGNOSIS  Diagnosis: Influenza  Assessment and Plan of Treatment: follow up with doctor and physical therapy      SECONDARY DISCHARGE DIAGNOSES  Diagnosis: Unsteady gait  Assessment and Plan of Treatment:

## 2023-12-03 NOTE — DISCHARGE NOTE NURSING/CASE MANAGEMENT/SOCIAL WORK - NSDCPEFALRISK_GEN_ALL_CORE
For information on Fall & Injury Prevention, visit: [email or web address] OR  [email or web address] OR  https://www.cdc.gov/steadi/patient.html For information on Fall & Injury Prevention, visit: [email or web address]/[email or web address] OR  [email or web address]/[email or web address]-tips-[email or web address]-injury OR  https://www.cdc.gov/steadi/patient.html

## 2023-12-03 NOTE — DISCHARGE NOTE PROVIDER - PROVIDER TOKENS
Continue on same dose of Zoloft.  Follow-up in 6 months.  
Follow-up in person for annual exam in 6 months.  Continue same dose of Adderall for now.  
Reviewed labs from 2021.  Recommended that she follow-up again with gastro.  
FREE:[LAST:[pcp],PHONE:[(   )    -],FAX:[(   )    -]]

## 2023-12-06 DIAGNOSIS — G35 MULTIPLE SCLEROSIS: ICD-10-CM

## 2023-12-06 DIAGNOSIS — J11.1 INFLUENZA DUE TO UNIDENTIFIED INFLUENZA VIRUS WITH OTHER RESPIRATORY MANIFESTATIONS: ICD-10-CM

## 2023-12-06 DIAGNOSIS — R26.81 UNSTEADINESS ON FEET: ICD-10-CM

## 2023-12-06 DIAGNOSIS — K59.00 CONSTIPATION, UNSPECIFIED: ICD-10-CM

## 2023-12-06 DIAGNOSIS — Z88.0 ALLERGY STATUS TO PENICILLIN: ICD-10-CM

## 2023-12-06 DIAGNOSIS — Z71.3 DIETARY COUNSELING AND SURVEILLANCE: ICD-10-CM

## 2023-12-06 DIAGNOSIS — R29.898 OTHER SYMPTOMS AND SIGNS INVOLVING THE MUSCULOSKELETAL SYSTEM: ICD-10-CM

## 2023-12-06 DIAGNOSIS — Z72.3 LACK OF PHYSICAL EXERCISE: ICD-10-CM
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
## 2023-12-28 NOTE — ED PROVIDER NOTE - CARDIAC, MLM
Problem: Discharge Planning  Goal: Discharge to home or other facility with appropriate resources  12/28/2023 0018 by Ashvin Blanco RN  Outcome: Progressing  12/27/2023 1042 by Zora Castro RN  Outcome: Progressing     Problem: Chronic Conditions and Co-morbidities  Goal: Patient's chronic conditions and co-morbidity symptoms are monitored and maintained or improved  12/28/2023 0018 by Ashvin Blanco RN  Outcome: Progressing  12/27/2023 1042 by Zora Castro RN  Outcome: Progressing     Problem: ABCDS Injury Assessment  Goal: Absence of physical injury  12/28/2023 0018 by Ashvin Blanco RN  Outcome: Progressing  12/27/2023 1042 by Zora Castro RN  Outcome: Progressing Strong pulse.

## 2024-02-13 NOTE — OCCUPATIONAL THERAPY INITIAL EVALUATION ADULT - LEVEL OF INDEPENDENCE, REHAB EVAL
I don't see any appt lakeisha.    Does he need anything?    Dulce Duarte, ANP-BC   moderate assist (50% patients effort)

## 2024-03-09 RX ORDER — GABAPENTIN 400 MG/1
0 CAPSULE ORAL
Refills: 0 | DISCHARGE

## 2024-08-21 NOTE — ED ADULT TRIAGE NOTE - BMI (KG/M2)
Spoke with patient regarding canceling [facility] Appointment with  on 9/11/24 as patient was seen today 8/21/24 with provider for a sooner appointment. Patient was agreeable to canceling 9/11/24 appointment.-Per Patient    34.4

## 2024-09-27 NOTE — ED ADULT TRIAGE NOTE - BANDS:
Allergy;

## 2024-11-23 ENCOUNTER — INPATIENT (INPATIENT)
Facility: HOSPITAL | Age: 64
LOS: 3 days | Discharge: HOME | End: 2024-11-27
Attending: HOSPITALIST | Admitting: HOSPITALIST
Payer: MEDICAID

## 2024-11-23 VITALS
HEART RATE: 88 BPM | WEIGHT: 175.05 LBS | HEIGHT: 63 IN | TEMPERATURE: 98 F | SYSTOLIC BLOOD PRESSURE: 119 MMHG | RESPIRATION RATE: 16 BRPM | OXYGEN SATURATION: 100 % | DIASTOLIC BLOOD PRESSURE: 79 MMHG

## 2024-11-23 DIAGNOSIS — Z98.891 HISTORY OF UTERINE SCAR FROM PREVIOUS SURGERY: Chronic | ICD-10-CM

## 2024-11-23 PROCEDURE — 99285 EMERGENCY DEPT VISIT HI MDM: CPT

## 2024-11-23 PROCEDURE — 71045 X-RAY EXAM CHEST 1 VIEW: CPT | Mod: 26

## 2024-11-23 RX ORDER — FAMOTIDINE 20 MG/1
20 TABLET, FILM COATED ORAL ONCE
Refills: 0 | Status: COMPLETED | OUTPATIENT
Start: 2024-11-23 | End: 2024-11-24

## 2024-11-23 RX ORDER — MAGNESIUM, ALUMINUM HYDROXIDE 200-225/5
30 SUSPENSION, ORAL (FINAL DOSE FORM) ORAL ONCE
Refills: 0 | Status: COMPLETED | OUTPATIENT
Start: 2024-11-23 | End: 2024-11-24

## 2024-11-23 NOTE — ED ADULT TRIAGE NOTE - CHIEF COMPLAINT QUOTE
Pt complains of R sided, non radiating, stabbing chest pain that started about 36 hours ago after eating pizza. History of MS. EMS gave 324 mg of ASA. Pt complains of R sided, non radiating, stabbing chest pain that started about 36 hours ago after eating pizza. History of MS unable to ambulate. EMS gave 324 mg of ASA.

## 2024-11-23 NOTE — ED PROVIDER NOTE - WR ORDER ID 1
Kathy Harrington P Neu Nurse Msg Pool (supporting Chas III, Michael VIRK [id number]

## 2024-11-23 NOTE — ED PROVIDER NOTE - CLINICAL SUMMARY MEDICAL DECISION MAKING FREE TEXT BOX
Patient presents emergency department with any chest pain.  Patient is hemodynamically stable afebrile presentation.  Patient physical exam unremarkable at this time.  Patient EKG shows no acute signs of ST segment elevation or T wave inversions.  Given patient presentation and history return lab work and imaging to evaluate for acute pathologies.

## 2024-11-23 NOTE — ED PROVIDER NOTE - OBJECTIVE STATEMENT
Patient is a 64-year-old female with a past medical history of MS bedbound who presents emergency department complaining of chest pain.  Patient states chest pain started yesterday.  Patient states he has been consistent in nature.  Patient describes the pain as substernal nonradiating.  Denies any fevers, chills, shortness of breath, palpitations.  Patient states the pain is worse with deep inspiration.  Patient denies any history of cardiac issues.  Patient denies any history of seeing a cardiologist.

## 2024-11-24 DIAGNOSIS — R07.9 CHEST PAIN, UNSPECIFIED: ICD-10-CM

## 2024-11-24 DIAGNOSIS — G35 MULTIPLE SCLEROSIS: ICD-10-CM

## 2024-11-24 DIAGNOSIS — R29.898 OTHER SYMPTOMS AND SIGNS INVOLVING THE MUSCULOSKELETAL SYSTEM: ICD-10-CM

## 2024-11-24 DIAGNOSIS — M79.89 OTHER SPECIFIED SOFT TISSUE DISORDERS: ICD-10-CM

## 2024-11-24 DIAGNOSIS — R09.89 OTHER SPECIFIED SYMPTOMS AND SIGNS INVOLVING THE CIRCULATORY AND RESPIRATORY SYSTEMS: ICD-10-CM

## 2024-11-24 DIAGNOSIS — R05.9 COUGH, UNSPECIFIED: ICD-10-CM

## 2024-11-24 DIAGNOSIS — Z29.9 ENCOUNTER FOR PROPHYLACTIC MEASURES, UNSPECIFIED: ICD-10-CM

## 2024-11-24 LAB
A1C WITH ESTIMATED AVERAGE GLUCOSE RESULT: 5.2 % — SIGNIFICANT CHANGE UP (ref 4–5.6)
ADD ON TEST-SPECIMEN IN LAB: SIGNIFICANT CHANGE UP
ALBUMIN SERPL ELPH-MCNC: 4.1 G/DL — SIGNIFICANT CHANGE UP (ref 3.3–5)
ALBUMIN SERPL ELPH-MCNC: 4.3 G/DL — SIGNIFICANT CHANGE UP (ref 3.3–5)
ALP SERPL-CCNC: 87 U/L — SIGNIFICANT CHANGE UP (ref 40–120)
ALP SERPL-CCNC: 94 U/L — SIGNIFICANT CHANGE UP (ref 40–120)
ALT FLD-CCNC: 30 U/L — SIGNIFICANT CHANGE UP (ref 4–33)
ALT FLD-CCNC: 34 U/L — HIGH (ref 4–33)
ANION GAP SERPL CALC-SCNC: 13 MMOL/L — SIGNIFICANT CHANGE UP (ref 7–14)
ANION GAP SERPL CALC-SCNC: 15 MMOL/L — HIGH (ref 7–14)
AST SERPL-CCNC: 23 U/L — SIGNIFICANT CHANGE UP (ref 4–32)
AST SERPL-CCNC: 30 U/L — SIGNIFICANT CHANGE UP (ref 4–32)
BASOPHILS # BLD AUTO: 0.04 K/UL — SIGNIFICANT CHANGE UP (ref 0–0.2)
BASOPHILS # BLD AUTO: 0.05 K/UL — SIGNIFICANT CHANGE UP (ref 0–0.2)
BASOPHILS NFR BLD AUTO: 0.8 % — SIGNIFICANT CHANGE UP (ref 0–2)
BASOPHILS NFR BLD AUTO: 1 % — SIGNIFICANT CHANGE UP (ref 0–2)
BILIRUB SERPL-MCNC: 0.6 MG/DL — SIGNIFICANT CHANGE UP (ref 0.2–1.2)
BILIRUB SERPL-MCNC: 0.6 MG/DL — SIGNIFICANT CHANGE UP (ref 0.2–1.2)
BUN SERPL-MCNC: 13 MG/DL — SIGNIFICANT CHANGE UP (ref 7–23)
BUN SERPL-MCNC: 13 MG/DL — SIGNIFICANT CHANGE UP (ref 7–23)
CALCIUM SERPL-MCNC: 10 MG/DL — SIGNIFICANT CHANGE UP (ref 8.4–10.5)
CALCIUM SERPL-MCNC: 9.5 MG/DL — SIGNIFICANT CHANGE UP (ref 8.4–10.5)
CHLORIDE SERPL-SCNC: 104 MMOL/L — SIGNIFICANT CHANGE UP (ref 98–107)
CHLORIDE SERPL-SCNC: 105 MMOL/L — SIGNIFICANT CHANGE UP (ref 98–107)
CHOLEST SERPL-MCNC: 213 MG/DL — HIGH
CO2 SERPL-SCNC: 20 MMOL/L — LOW (ref 22–31)
CO2 SERPL-SCNC: 23 MMOL/L — SIGNIFICANT CHANGE UP (ref 22–31)
CREAT SERPL-MCNC: 0.55 MG/DL — SIGNIFICANT CHANGE UP (ref 0.5–1.3)
CREAT SERPL-MCNC: 0.59 MG/DL — SIGNIFICANT CHANGE UP (ref 0.5–1.3)
D DIMER BLD IA.RAPID-MCNC: <150 NG/ML DDU — SIGNIFICANT CHANGE UP
EGFR: 101 ML/MIN/1.73M2 — SIGNIFICANT CHANGE UP
EGFR: 102 ML/MIN/1.73M2 — SIGNIFICANT CHANGE UP
EOSINOPHIL # BLD AUTO: 0.21 K/UL — SIGNIFICANT CHANGE UP (ref 0–0.5)
EOSINOPHIL # BLD AUTO: 0.23 K/UL — SIGNIFICANT CHANGE UP (ref 0–0.5)
EOSINOPHIL NFR BLD AUTO: 4.2 % — SIGNIFICANT CHANGE UP (ref 0–6)
EOSINOPHIL NFR BLD AUTO: 4.8 % — SIGNIFICANT CHANGE UP (ref 0–6)
ESTIMATED AVERAGE GLUCOSE: 103 — SIGNIFICANT CHANGE UP
FLUAV AG NPH QL: SIGNIFICANT CHANGE UP
FLUBV AG NPH QL: SIGNIFICANT CHANGE UP
GLUCOSE SERPL-MCNC: 102 MG/DL — HIGH (ref 70–99)
GLUCOSE SERPL-MCNC: 119 MG/DL — HIGH (ref 70–99)
HCT VFR BLD CALC: 43.5 % — SIGNIFICANT CHANGE UP (ref 34.5–45)
HCT VFR BLD CALC: 45.4 % — HIGH (ref 34.5–45)
HDLC SERPL-MCNC: 56 MG/DL — SIGNIFICANT CHANGE UP
HGB BLD-MCNC: 14.2 G/DL — SIGNIFICANT CHANGE UP (ref 11.5–15.5)
HGB BLD-MCNC: 14.9 G/DL — SIGNIFICANT CHANGE UP (ref 11.5–15.5)
IANC: 2.53 K/UL — SIGNIFICANT CHANGE UP (ref 1.8–7.4)
IANC: 2.59 K/UL — SIGNIFICANT CHANGE UP (ref 1.8–7.4)
IMM GRANULOCYTES NFR BLD AUTO: 0.6 % — SIGNIFICANT CHANGE UP (ref 0–0.9)
IMM GRANULOCYTES NFR BLD AUTO: 0.8 % — SIGNIFICANT CHANGE UP (ref 0–0.9)
LIPID PNL WITH DIRECT LDL SERPL: 130 MG/DL — HIGH
LYMPHOCYTES # BLD AUTO: 1.54 K/UL — SIGNIFICANT CHANGE UP (ref 1–3.3)
LYMPHOCYTES # BLD AUTO: 1.65 K/UL — SIGNIFICANT CHANGE UP (ref 1–3.3)
LYMPHOCYTES # BLD AUTO: 32 % — SIGNIFICANT CHANGE UP (ref 13–44)
LYMPHOCYTES # BLD AUTO: 32.6 % — SIGNIFICANT CHANGE UP (ref 13–44)
MAGNESIUM SERPL-MCNC: 2.1 MG/DL — SIGNIFICANT CHANGE UP (ref 1.6–2.6)
MCHC RBC-ENTMCNC: 27.9 PG — SIGNIFICANT CHANGE UP (ref 27–34)
MCHC RBC-ENTMCNC: 28 PG — SIGNIFICANT CHANGE UP (ref 27–34)
MCHC RBC-ENTMCNC: 32.6 G/DL — SIGNIFICANT CHANGE UP (ref 32–36)
MCHC RBC-ENTMCNC: 32.8 G/DL — SIGNIFICANT CHANGE UP (ref 32–36)
MCV RBC AUTO: 84.9 FL — SIGNIFICANT CHANGE UP (ref 80–100)
MCV RBC AUTO: 85.8 FL — SIGNIFICANT CHANGE UP (ref 80–100)
MONOCYTES # BLD AUTO: 0.42 K/UL — SIGNIFICANT CHANGE UP (ref 0–0.9)
MONOCYTES # BLD AUTO: 0.54 K/UL — SIGNIFICANT CHANGE UP (ref 0–0.9)
MONOCYTES NFR BLD AUTO: 10.7 % — SIGNIFICANT CHANGE UP (ref 2–14)
MONOCYTES NFR BLD AUTO: 8.7 % — SIGNIFICANT CHANGE UP (ref 2–14)
NEUTROPHILS # BLD AUTO: 2.53 K/UL — SIGNIFICANT CHANGE UP (ref 1.8–7.4)
NEUTROPHILS # BLD AUTO: 2.59 K/UL — SIGNIFICANT CHANGE UP (ref 1.8–7.4)
NEUTROPHILS NFR BLD AUTO: 51.1 % — SIGNIFICANT CHANGE UP (ref 43–77)
NEUTROPHILS NFR BLD AUTO: 52.7 % — SIGNIFICANT CHANGE UP (ref 43–77)
NON HDL CHOLESTEROL: 157 MG/DL — HIGH
NRBC # BLD: 0 /100 WBCS — SIGNIFICANT CHANGE UP (ref 0–0)
NRBC # BLD: 0 /100 WBCS — SIGNIFICANT CHANGE UP (ref 0–0)
NRBC # FLD: 0 K/UL — SIGNIFICANT CHANGE UP (ref 0–0)
NRBC # FLD: 0 K/UL — SIGNIFICANT CHANGE UP (ref 0–0)
NT-PROBNP SERPL-SCNC: <36 PG/ML — SIGNIFICANT CHANGE UP
PHOSPHATE SERPL-MCNC: 3.6 MG/DL — SIGNIFICANT CHANGE UP (ref 2.5–4.5)
PLATELET # BLD AUTO: 247 K/UL — SIGNIFICANT CHANGE UP (ref 150–400)
PLATELET # BLD AUTO: 265 K/UL — SIGNIFICANT CHANGE UP (ref 150–400)
POTASSIUM SERPL-MCNC: 3.9 MMOL/L — SIGNIFICANT CHANGE UP (ref 3.5–5.3)
POTASSIUM SERPL-MCNC: 4.2 MMOL/L — SIGNIFICANT CHANGE UP (ref 3.5–5.3)
POTASSIUM SERPL-SCNC: 3.9 MMOL/L — SIGNIFICANT CHANGE UP (ref 3.5–5.3)
POTASSIUM SERPL-SCNC: 4.2 MMOL/L — SIGNIFICANT CHANGE UP (ref 3.5–5.3)
PROT SERPL-MCNC: 6.8 G/DL — SIGNIFICANT CHANGE UP (ref 6–8.3)
PROT SERPL-MCNC: 7.3 G/DL — SIGNIFICANT CHANGE UP (ref 6–8.3)
RBC # BLD: 5.07 M/UL — SIGNIFICANT CHANGE UP (ref 3.8–5.2)
RBC # BLD: 5.35 M/UL — HIGH (ref 3.8–5.2)
RBC # FLD: 12.8 % — SIGNIFICANT CHANGE UP (ref 10.3–14.5)
RBC # FLD: 12.8 % — SIGNIFICANT CHANGE UP (ref 10.3–14.5)
RSV RNA NPH QL NAA+NON-PROBE: SIGNIFICANT CHANGE UP
SARS-COV-2 RNA SPEC QL NAA+PROBE: SIGNIFICANT CHANGE UP
SODIUM SERPL-SCNC: 140 MMOL/L — SIGNIFICANT CHANGE UP (ref 135–145)
SODIUM SERPL-SCNC: 140 MMOL/L — SIGNIFICANT CHANGE UP (ref 135–145)
TRIGL SERPL-MCNC: 133 MG/DL — SIGNIFICANT CHANGE UP
TROPONIN T, HIGH SENSITIVITY RESULT: 6 NG/L — SIGNIFICANT CHANGE UP
TROPONIN T, HIGH SENSITIVITY RESULT: 8 NG/L — SIGNIFICANT CHANGE UP
TSH SERPL-MCNC: 5.24 UIU/ML — HIGH (ref 0.27–4.2)
WBC # BLD: 4.81 K/UL — SIGNIFICANT CHANGE UP (ref 3.8–10.5)
WBC # BLD: 5.06 K/UL — SIGNIFICANT CHANGE UP (ref 3.8–10.5)
WBC # FLD AUTO: 4.81 K/UL — SIGNIFICANT CHANGE UP (ref 3.8–10.5)
WBC # FLD AUTO: 5.06 K/UL — SIGNIFICANT CHANGE UP (ref 3.8–10.5)

## 2024-11-24 PROCEDURE — 99254 IP/OBS CNSLTJ NEW/EST MOD 60: CPT

## 2024-11-24 PROCEDURE — 70496 CT ANGIOGRAPHY HEAD: CPT | Mod: 26,MC

## 2024-11-24 PROCEDURE — 93010 ELECTROCARDIOGRAM REPORT: CPT

## 2024-11-24 PROCEDURE — 0042T: CPT | Mod: MC

## 2024-11-24 PROCEDURE — 70450 CT HEAD/BRAIN W/O DYE: CPT | Mod: 26,MC,59

## 2024-11-24 PROCEDURE — 12345: CPT | Mod: NC

## 2024-11-24 PROCEDURE — 70498 CT ANGIOGRAPHY NECK: CPT | Mod: 26,MC

## 2024-11-24 PROCEDURE — 99223 1ST HOSP IP/OBS HIGH 75: CPT

## 2024-11-24 PROCEDURE — 93970 EXTREMITY STUDY: CPT | Mod: 26

## 2024-11-24 RX ORDER — ACETAMINOPHEN 500MG 500 MG/1
1000 TABLET, COATED ORAL ONCE
Refills: 0 | Status: COMPLETED | OUTPATIENT
Start: 2024-11-24 | End: 2024-11-24

## 2024-11-24 RX ORDER — ENOXAPARIN SODIUM 30 MG/.3ML
40 INJECTION SUBCUTANEOUS EVERY 24 HOURS
Refills: 0 | Status: DISCONTINUED | OUTPATIENT
Start: 2024-11-24 | End: 2024-11-27

## 2024-11-24 RX ORDER — GABAPENTIN 300 MG/1
1 CAPSULE ORAL
Refills: 0 | DISCHARGE

## 2024-11-24 RX ORDER — GABAPENTIN 300 MG/1
300 CAPSULE ORAL THREE TIMES A DAY
Refills: 0 | Status: DISCONTINUED | OUTPATIENT
Start: 2024-11-24 | End: 2024-11-27

## 2024-11-24 RX ORDER — OCRELIZUMAB 300 MG/10ML
600 INJECTION INTRAVENOUS
Refills: 0 | DISCHARGE

## 2024-11-24 RX ORDER — INFLUENZA VIRUS VACCINE 15; 15; 15; 15 UG/.5ML; UG/.5ML; UG/.5ML; UG/.5ML
0.5 SUSPENSION INTRAMUSCULAR ONCE
Refills: 0 | Status: DISCONTINUED | OUTPATIENT
Start: 2024-11-24 | End: 2024-11-27

## 2024-11-24 RX ADMIN — GABAPENTIN 300 MILLIGRAM(S): 300 CAPSULE ORAL at 13:40

## 2024-11-24 RX ADMIN — Medication 81 MILLIGRAM(S): at 13:40

## 2024-11-24 RX ADMIN — GABAPENTIN 300 MILLIGRAM(S): 300 CAPSULE ORAL at 21:09

## 2024-11-24 RX ADMIN — Medication 80 MILLIGRAM(S): at 21:09

## 2024-11-24 RX ADMIN — ACETAMINOPHEN 500MG 400 MILLIGRAM(S): 500 TABLET, COATED ORAL at 04:52

## 2024-11-24 RX ADMIN — ACETAMINOPHEN 500MG 400 MILLIGRAM(S): 500 TABLET, COATED ORAL at 21:09

## 2024-11-24 NOTE — H&P ADULT - NSHPSOCIALHISTORY_GEN_ALL_CORE
Lives with family  Needs 1 person assist for all of her ADLs, ambulatory with wheelchair or walker for short intervals  Denies tobacco, EtOH, or illicit substance use

## 2024-11-24 NOTE — H&P ADULT - PROBLEM SELECTOR PLAN 4
- Reports having L ankle swelling at baseline, now with new L calf swelling  - On exam noted to have b/l non-pitting edema but worse on L and +TTP on L  - No erythema/warmth to suggest cellulitis  - D-Dimer in ED negative    -> Check US duplex venous for DVT r/o  -> Encourage leg elevation

## 2024-11-24 NOTE — PHYSICAL THERAPY INITIAL EVALUATION ADULT - PERTINENT HX OF CURRENT PROBLEM, REHAB EVAL
Patient is a 64 year old female with a past medical history of Multiple Sclerosis, presents to the hospital initially with complaints of new onset midsternal chest pain x1 day with course complicated by new onset Left leg weakness/numbness concerning for possible CVA vs MS flare.

## 2024-11-24 NOTE — OCCUPATIONAL THERAPY INITIAL EVALUATION ADULT - DIAGNOSIS, OT EVAL
Pt with impaired strength and ROM in bilateral upper extremities, impaired balance and endurance impacting ability to complete ADLs, IADLs, functional mobility/transfers.

## 2024-11-24 NOTE — PHYSICAL THERAPY INITIAL EVALUATION ADULT - GROSSLY INTACT, SENSORY
Patient reports paresisthesia in hands and left lateral thigh region, otherwise sensation is grossly intact/Grossly Intact Patient reports paraesthesia in hands and left lateral thigh region, otherwise sensation is grossly intact/Grossly Intact

## 2024-11-24 NOTE — DISCHARGE NOTE PROVIDER - CARE PROVIDERS DIRECT ADDRESSES
,[email or web address]

## 2024-11-24 NOTE — SWALLOW BEDSIDE ASSESSMENT ADULT - COMMENTS
As per H&P "This is a 64F with history of Multiple Sclerosis currently on Ocrevus (next dose due on 11/25/24) who presents to the hospital initially with complaints of new onset midsternal chest pain x1 day. Reports that she developed a soreness in the middle of her chest on Friday that had persisted and therefore had come to the ED for evaluation. Reports palpitations associated with the chest pain but denies SOB, dizziness, or LOC. States that the pain is worse with movements. Denies a pleuritic component to the pain, denies radiation of the pain. Does report having a cough for the past 1 week with minimal sputum production. Denies fevers/chills, denies URI complaints. Also reports new edema of the L lower leg, usually has swelling of the L ankle but now has swelling of the L calf which is new for her."    11/23 CXR "Clear lungs"  11/23 CTH "No acute intracranial hemorrhage, mass effect, or midline shift."    Patient seen in [facility] for bedside swallow evaluation, received upright in bed, alert and oriented to person, place, time and situation. Patient reported history of dysphagia, reporting a choking episode ~2-3 years ago in which the Heimlich was required. Patient reported she feels some foods "don't go down well".

## 2024-11-24 NOTE — PROGRESS NOTE ADULT - PROBLEM SELECTOR PLAN 3
- Cough x1 week with minimal sputum, no SOB, no hemoptysis, no fevers/chills  - Is on ocrevus which can lower the immune system but currently no septic vitals, no leukocytosis, afebrile here  - CXR with clear lungs    PLAN  -> Check COVID/Flu/RSV swab  -> Monitor symptomatically for now

## 2024-11-24 NOTE — ED ADULT NURSE REASSESSMENT NOTE - NS ED NURSE REASSESS COMMENT FT1
Break RN: Pt resting in stretcher, A&O x 4, offers complaint of neck pain and intermittent numbness and tingling to left side lower extremity, neuro check as noted, medicated as per EMAR, RR equal and unlabored, safety maintained, comfort provided care plan ongoing.
LAURA RN: pt received as code stroke to CTscan, 20gIV to R FA, flushes well with no signs of infiltration. pt brought back to spot 3a post CTscan. pt unsteady on ambulation trial. pt endorsing L sided arm and leg weakness and numbness that began 1hr prior to code stroke being called. on assessment with neurology pt has decreased sensation to LLE and LUE. pt denies vision changes, dizziness/lightheadedness. no slurred speech noted. dysphagia screen passed. rpt vs per flowsheet. fsg noted to be 91 provider aware. primary RN Nereyda made aware. safety measures maintained throughout pt care.

## 2024-11-24 NOTE — PATIENT PROFILE ADULT - NSPROPTRIGHTCAREGIVER_GEN_A_NUR
Current well managed on Xanax 0 25 mg p r n    No refill needed at this time
Most recent A1c of 6 2   - Continue on metformin 500 mg twice a day  - diabetic foot exam performed in office today with normal results  - repeat A1c in 3 months 
Moved to 2 cigarettes a day  She is still planning on quitting hopefully by the new year  Discussed likely that it is more behavioral issue and not a nicotine addiction   - Patient will attempt chewing gum during the 2 times a day she smokes a half cigarettes  - Drected the patient to contact us if she would like assistance of any degree 
Patient is actively engaged in intensive dietary and exercise changes  - directed continued follow-up with dietician   - Follow-up in 3-6 months 
Will managed  Initial blood pressure of 130/80, 2nd blood pressure of 130/86   - continue lisinopril hydrochlorothiazide   - Requested bring BP log at FU visit 
yes

## 2024-11-24 NOTE — OCCUPATIONAL THERAPY INITIAL EVALUATION ADULT - ADDITIONAL COMMENTS
Pt lives in a private home with her sisters with 2 steps to enter and a full flight inside the home going upstairs to pt's bedroom. Pt's bathroom has a walk-in stall shower with grab bars and a shower chair. Pt has a home health aide 7 days a week who is there all day until 6PM. Pt reports home health aide assists with transfers and all ADLs. Pt reports her sister comes home at 6PM and is able to assist with all ADLs and transfers during the night. Pt lives in a private home with her sisters with 2 steps to enter and a full flight inside the home going upstairs to pt's bedroom. Pt's bathroom has a walk-in stall shower with grab bars and a shower chair. Pt has a home health aide 7 days a week who is there all day until 6PM. Pt reports home health aide assists with transfers and all ADLs. Pt reports her sister comes home at 6PM and is able to assist with all ADLs and transfers during the night. Pt was ambulating inside the home with a rolling walker and cane and used a wheelchair when leaving her home prior to admission.   Patient left semisupine in bed in NAD, HOB 30 degrees. All lines intact and bed alarm on.

## 2024-11-24 NOTE — SWALLOW BEDSIDE ASSESSMENT ADULT - ASR SWALLOW RECOMMEND DIAG
Cinesophagram to objectively assess swallow mechanism given complaint of globus sensation and history of dysphagia

## 2024-11-24 NOTE — ED ADULT NURSE NOTE - OBJECTIVE STATEMENT
Pt received on stretcher c/o riht side chest pain that started yesterday, as per pt she noticed that after eating a meal her chest started to hurt. Pt denies any dizziness, SOB, nausea or vomiting, pt denies any abdominal pain. Pt received on stretcher c/o right side chest pain that started yesterday, as per pt she noticed that after eating a meal her chest started to hurt. Pt denies any dizziness, SOB, nausea or vomiting, pt denies any abdominal pain.

## 2024-11-24 NOTE — OCCUPATIONAL THERAPY INITIAL EVALUATION ADULT - GENERAL OBSERVATIONS, REHAB EVAL
Pt found supine in bed +tele +pulse ox +primafit in NAD, all lines intact. Pt OK to be seen for OT per KARRI Puentes. HR 69 BPM, SpO2 100% on RA.

## 2024-11-24 NOTE — H&P ADULT - PROBLEM SELECTOR PLAN 5
- On ocrevus infusions q6 months, was supposed to get her next infusion tomorrow (11/25)  - Also on gabapentin 300mg TID for neuropathic pain    -> Advised patient to contact her neurologist and likely have her ocrevus infusion rescheduled  -> c/w gabapentin

## 2024-11-24 NOTE — PROGRESS NOTE ADULT - SUBJECTIVE AND OBJECTIVE BOX
PROGRESS NOTE:   Authored by Dr. Pao Graham MD (PGY-1).     Patient is a 64y old  Female who presents with a chief complaint of Chest pain, new onset L leg numbness and weakness (24 Nov 2024 07:58)      SUBJECTIVE / OVERNIGHT EVENTS:  Seen at bedside, still endorses left leg numbness and weakness, improvement in chest pain    ADDITIONAL REVIEW OF SYSTEMS:  Patient denies fevers, chills, chest pain, shortness of breath, nausea, abdominal pain, diarrhea, constipation, dysuria, leg swelling, headache, light headedness.    MEDICATIONS  (STANDING):  aspirin enteric coated 81 milliGRAM(s) Oral daily  atorvastatin 80 milliGRAM(s) Oral at bedtime  enoxaparin Injectable 40 milliGRAM(s) SubCutaneous every 24 hours  gabapentin 300 milliGRAM(s) Oral three times a day  influenza   Vaccine 0.5 milliLiter(s) IntraMuscular once    MEDICATIONS  (PRN):      CAPILLARY BLOOD GLUCOSE      POCT Blood Glucose.: 91 mg/dL (24 Nov 2024 03:13)    I&O's Summary      PHYSICAL EXAM:  Vital Signs Last 24 Hrs  T(C): 36.5 (24 Nov 2024 07:00), Max: 36.7 (24 Nov 2024 05:21)  T(F): 97.7 (24 Nov 2024 07:00), Max: 98 (24 Nov 2024 05:21)  HR: 68 (24 Nov 2024 07:00) (68 - 88)  BP: 124/84 (24 Nov 2024 07:00) (119/79 - 130/68)  BP(mean): 96 (24 Nov 2024 07:00) (90 - 96)  RR: 18 (24 Nov 2024 07:00) (16 - 18)  SpO2: 100% (24 Nov 2024 07:00) (98% - 100%)    Parameters below as of 24 Nov 2024 07:00  Patient On (Oxygen Delivery Method): room air        CONSTITUTIONAL: NAD, well-developed  RESPIRATORY: Normal respiratory effort; lungs are clear to auscultation bilaterally  CARDIOVASCULAR: Regular rate and rhythm, normal S1 and S2, no murmur/rub/gallop; No lower extremity edema; Peripheral pulses are 2+ bilaterally  ABDOMEN: Nontender to palpation, normoactive bowel sounds, no rebound/guarding; No hepatosplenomegaly  MSK: no clubbing or cyanosis of digits; no joint swelling or tenderness to palpation  PSYCH: A+O to person, place, and time; affect appropriate  EXT: left leg mildly cold to touch compared to right leg, pulses 2+ intact b/l     LABS:                        14.2   4.81  )-----------( 247      ( 24 Nov 2024 06:11 )             43.5     11-24    140  |  105  |  13  ----------------------------<  119[H]  3.9   |  20[L]  |  0.59    Ca    9.5      24 Nov 2024 06:11  Phos  3.6     11-24  Mg     2.10     11-24    TPro  6.8  /  Alb  4.1  /  TBili  0.6  /  DBili  x   /  AST  23  /  ALT  30  /  AlkPhos  87  11-24          Urinalysis Basic - ( 24 Nov 2024 06:11 )    Color: x / Appearance: x / SG: x / pH: x  Gluc: 119 mg/dL / Ketone: x  / Bili: x / Urobili: x   Blood: x / Protein: x / Nitrite: x   Leuk Esterase: x / RBC: x / WBC x   Sq Epi: x / Non Sq Epi: x / Bacteria: x          Tele Reviewed:    RADIOLOGY & ADDITIONAL TESTS:  Results Reviewed:   Imaging Personally Reviewed:  Electrocardiogram Personally Reviewed:

## 2024-11-24 NOTE — OCCUPATIONAL THERAPY INITIAL EVALUATION ADULT - NSOTDISCHREC_GEN_A_CORE
Recommend supervision/assist with functional activities which pt reports home health aide and sister can provide./Home OT

## 2024-11-24 NOTE — DISCHARGE NOTE PROVIDER - NSDCMRMEDTOKEN_GEN_ALL_CORE_FT
gabapentin 300 mg oral capsule: 1 cap(s) orally 3 times a day  Ocrevus 300 mg/10 mL intravenous solution: 600 milligram(s) intravenously every 6 months Every 6 months   gabapentin 300 mg oral capsule: 1 cap(s) orally 3 times a day  MiraLax oral powder for reconstitution: 17 gram(s) orally once a day as needed for  constipation  Ocrevus 300 mg/10 mL intravenous solution: 600 milligram(s) intravenously every 6 months Every 6 months  PT 2-3x per week, ICD G35: PT 2-3x per week, ICD G35  Tylenol Extra Strength 500 mg oral tablet: 2 tab(s) orally 3 times a day

## 2024-11-24 NOTE — PHYSICAL THERAPY INITIAL EVALUATION ADULT - ADDITIONAL COMMENTS
Patient lives in a private home with her sister and has an aide that comes a couple of hours for 7 days a week, +2 steps to enter, +1 flight of steps to negotiate to get to bedroom, patient owns and uses a rolling walker and cane for household ambulation and a wheel chair for community ambulation.    Patient left semi-supine in bed, all lines intact, NAD, with all needs within reach. heart rate 72 beats per minute Patient lives in a private home with her sister and has an aide that comes a couple of hours daily x 7 days a week, +2 steps to enter, +1 flight of steps to negotiate to get to bedroom, patient owns and uses a rolling walker and cane for household ambulation and a wheel chair for community ambulation.    Patient left semi-supine in bed, all lines intact, NAD, with all needs within reach. heart rate 72 beats per minute Patient lives in a private home with her sister and has an aide that comes a couple of hours daily x 7 days a week, +2 steps to enter, +1 flight of steps to negotiate to get to bedroom, patient owns and uses a rolling walker and cane for household ambulation and a wheel chair for community ambulation. Pt reports requiring assistance for all aspects of mobility, including transfers, ambulation and stair negotiation.     Patient left semi-supine in bed, all lines intact, NAD, with all needs within reach. heart rate 72 beats per minute. KARRI olvera.

## 2024-11-24 NOTE — CONSULT NOTE ADULT - ATTENDING COMMENTS
She reports only left leg weakness and no arm weakness.   Reflexes 2 throughout except knees 3, B.  B upgoing toes.     A/P  Ms. Hooks is a 63 yo woman with a h/o multiple sclerosis with worsening left leg weakness.  The differential diagnosis includes: MS exacerbation, MS pseudoexacerbation, stroke.   MRI brain, cervical and thoracic spine w/wo.   I agree with work up and management as above.   D/W patient.   Thank you. She reports only left leg weakness and no arm weakness.   Reflexes 2 throughout except knees 3, B.  B upgoing toes.     A/P  Ms. Hooks is a 65 yo woman with a h/o multiple sclerosis with worsening left leg weakness.  Last dose of ocrelizumab was April.    The differential diagnosis includes: MS exacerbation, MS pseudoexacerbation, stroke.   MRI brain, cervical and thoracic spine w/wo.   I agree with work up and management as above.   D/W patient.   Thank you.

## 2024-11-24 NOTE — H&P ADULT - NSHPPHYSICALEXAM_GEN_ALL_CORE
Vital Signs Last 24 Hrs  T(C): 36.5 (24 Nov 2024 07:00), Max: 36.7 (24 Nov 2024 05:21)  T(F): 97.7 (24 Nov 2024 07:00), Max: 98 (24 Nov 2024 05:21)  HR: 68 (24 Nov 2024 07:00) (68 - 88)  BP: 124/84 (24 Nov 2024 07:00) (119/79 - 130/68)  BP(mean): 96 (24 Nov 2024 07:00) (90 - 96)  RR: 18 (24 Nov 2024 07:00) (16 - 18)  SpO2: 100% (24 Nov 2024 07:00) (98% - 100%)    Parameters below as of 24 Nov 2024 07:00  Patient On (Oxygen Delivery Method): room air    GENERAL: No acute distress, well-developed  EYES/ENT: EOMI, PERRL, conjunctiva and sclera clear, Neck supple, No JVD, moist mucosa  CHEST/LUNG: Clear to auscultation bilaterally; No wheeze, equal breath sounds bilaterally   BACK: No spinal tenderness  HEART: Regular rate and rhythm; No murmurs, rubs, or gallops  ABDOMEN: Soft, Nontender, Nondistended; Bowel sounds present  EXTREMITIES: +DP/PT/Radial pulses b/l, +non-pitting edema b/l with L > R, +TTP of the LLE but no warmth/erythema noted  PSYCH: Nl behavior, nl affect  NEUROLOGY: AAOx3; speech fluent, face symmetric, tongue midline, EOMI/PERRL, SILT b/l V1-V3, shoulder shrug intact b/l; Decreased SILT to L arm and L leg; 5/5 strength b/l LE and RLE, 4+/5 strength of the L LE at hip/knee/foot; no drift of either upper or lower extremity but L leg is weaker antigravity, FTN intact b/l  SKIN: Normal color, No rashes or lesions

## 2024-11-24 NOTE — OCCUPATIONAL THERAPY INITIAL EVALUATION ADULT - GROSSLY INTACT, SENSORY
Pt reports occasional numbness in Bilateral hands not new. Pt reports new occasional numbness in left lower extremity.

## 2024-11-24 NOTE — PROGRESS NOTE ADULT - PROBLEM SELECTOR PLAN 2
- New onset L leg numbness and weakness while in ED, here on examination has decreased sensation to light touch of the L arm and L leg, also with weakness of the L leg at hip/knee/foot when compared to the R side  - CTH and CTA H/N without acute abnormalities  - Neuro evaluated patient -> concern for CVA vs MS flare, patient was offered tenecteplase but she/family refused it after discussion with neurology  -passed dysphagia screen in ED-->minced and moist per SS    PLAN  - CTM on telemetry  -> neurology following, recs appreciated  -> Permissive HTN to 220/120 for 48 hrs followed by gradual normotension  -> Neuro checks and vitals q4h  -> f/u TTE with bubble study, MR Head/Cervical Spine w/wo ordered  -> A1C, lipid profile, TSH as above  -  c/w low dose aspirin and atorvastatin 80mg  -> f/u PT/OT eval  -> Obtain collateral from patient's outpatient neurologist if able - New onset L leg numbness and weakness while in ED, here on examination has decreased sensation to light touch of the L arm and L leg, also with weakness of the L leg at hip/knee/foot when compared to the R side  - CTH and CTA H/N without acute abnormalities  - Neuro evaluated patient -> concern for CVA vs MS flare, patient was offered tenecteplase but she/family refused it after discussion with neurology  -passed dysphagia screen in ED-->minced and moist per SS  A1c: 5.2%    PLAN  - CTM on telemetry  -> neurology following, recs appreciated  -> Permissive HTN to 220/120 for 48 hrs followed by gradual normotension  -> Neuro checks and vitals q4h  -> f/u TTE with bubble study, MR Head/Cervical Spine w/wo ordered  -  c/w low dose aspirin and atorvastatin 80mg  -> f/u PT/OT eval  -> Obtain collateral from patient's outpatient neurologist if able

## 2024-11-24 NOTE — H&P ADULT - PROBLEM SELECTOR PLAN 2
- New onset L leg numbness and weakness while in ED, here on examination has decreased sensation to light touch of the L arm and L leg, also with weakness of the L leg at hip/knee/foot when compared to the R side  - CTH and CTA H/N without acute abnormalities  - Neuro evaluated patient -> concern for CVA vs MS flare, patient was offered tenecteplase but she/family refused it after discussion with neurology    -> Will monitor on telemetry  -> Permissive HTN to 220/120 for 48 hrs followed by gradual normotension  -> Neuro checks and vitals q4h  -> TTE with bubble study, MR Head/Cervical Spine w/wo ordered  -> A1C, lipid profile, TSH as above  -> Started on low dose aspirin and atorvastatin 80mg  -> passed dysphagia screen in ED -> will place on soft and bite sized diet pending S&S eval  -> PT/OT evals ordered  -> Obtain collateral from patient's outpatient neurologist if able  -> f/u further neurology reccs

## 2024-11-24 NOTE — STROKE CODE NOTE - NIH STROKE SCALE: 1B. LOC QUESTIONS, QM
[facility] VAD/Heart Failure Progress Note Admit Date: 10/29/2019 POD:  * No surgery found * Procedure:      
 
 Subjective:  
Mild dyspnea, fatigue, and weakness; abx's for pump infection; workign on dispo Objective:  
Vitals: 
Pulse 80, temperature 98 °F (36.7 °C), resp. rate 16, height 5' 10\" (1.778 m), weight 251 lb 1.7 oz (113.9 kg), SpO2 98 %. Temp (24hrs), Av.1 °F (36.7 °C), Min:98 °F (36.7 °C), Max:98.2 °F (36.8 °C) Hemodynamics: 
 CO:   
 CI:   
 CVP: CVP (mmHg): 5 mmHg (19 1000) SVR:   
 PAP Systolic:   
 PAP Diastolic:   
 PVR:   
 VP96:   
 SCV02:   
 
VAD Interrogation: LVAD (Heartmate) Pump Speed (RPM): 96869 Pump Flow (LPM): 4.9 PI (Pulsitility Index): 4.7 Power: 8.3  Test: No 
Back Up  at Bedside & Labeled: Yes Power Module Test: No 
Driveline Site Care: No 
Driveline Dressing: Clean, Dry, and Intact EKG/Rhythm:   
 
Extubation Date / Time:  
 
CT Output:  
 
Ventilator: 
  
 
Oxygen Therapy: 
Oxygen Therapy O2 Sat (%): 98 % (19 0746) Pulse via Oximetry: 80 beats per minute (11/10/19 0034) O2 Device: Room air (19 0820) O2 Flow Rate (L/min): 2 l/min (19) FIO2 (%): 98 % (19 1455) CXR: 
 
Admission Weight: Last Weight Weight: 269 lb 10 oz (122.3 kg) Weight: 251 lb 1.7 oz (113.9 kg) Intake / Output / Drain: 
Current Shift:  0701 -  1900 In: 240 [P.O.:240] Out: - Last 24 hrs.:  
 
Intake/Output Summary (Last 24 hours) at 2019 1451 Last data filed at 2019 1101 Gross per 24 hour Intake 540 ml Output 325 ml Net 215 ml No results for input(s): CPK, CKMB, TROIQ in the last 72 hours. No results for input(s): NA, K, CO2, BUN, CREA, GLU, PHOS, MG, WBC, HGB, HCT, PLT, HGBEXT, HCTEXT, PLTEXT in the last 72 hours. No lab exists for component:  ALB No results for input(s): INR, PTP, APTT, INREXT in the last 72 hours. No lab exists for component: PBNP Current Facility-Administered Medications:  
  scopolamine (TRANSDERM-SCOP) 1 mg over 3 days 1 Patch, 1 Patch, TransDERmal, Q72H, Guillaume Jamison NP, 1 Patch at 11/14/19 1042   morphine injection 2 mg, 2 mg, IntraVENous, Q2H PRN, Isabelle Sharma MD, 2 mg at 11/13/19 0320   LORazepam (ATIVAN) injection 1 mg, 1 mg, IntraVENous, Q15MIN PRN, Isabelle Sharma MD 
  glycopyrrolate (ROBINUL) injection 0.2 mg, 0.2 mg, IntraVENous, Q4H PRN, Maryuri KAUFFMAN MD, 0.2 mg at 11/08/19 1354   saline peripheral flush soln 5-40 mL, 5-40 mL, InterCATHeter, PRN, Veronica Alicea MD, 10 mL at 11/10/19 0010   acetaminophen (TYLENOL) tablet 650 mg, 650 mg, Oral, Q6H PRN, Guillaume Jamison NP, 650 mg at 11/11/19 1096   balsam peru-castor oil (VENELEX) ointment, , Topical, Q8H, Guillaume Jamison NP 
  magnesium oxide (MAG-OX) tablet 800 mg, 800 mg, Oral, TID, Will, Preethi B, NP, 800 mg at 11/14/19 2441   hydrALAZINE (APRESOLINE) 20 mg/mL injection 10 mg, 10 mg, IntraVENous, Q6H PRN, Dafne Rose NP, 10 mg at 11/04/19 2207   pantoprazole (PROTONIX) tablet 40 mg, 40 mg, Oral, ACB&D, AlmJerome levin MD, 40 mg at 11/14/19 8998   ondansetron (ZOFRAN) injection 4 mg, 4 mg, IntraVENous, Q4H PRN, AsgedMona burch MD 
  bacitracin 500 unit/gram packet 1 Packet, 1 Packet, Topical, PRN, Neetu Moore MD, 1 Packet at 11/01/19 1222   oxyCODONE IR (ROXICODONE) tablet 10 mg, 10 mg, Oral, Q8H, Preethi Solis NP, 10 mg at 11/14/19 1405   levothyroxine (SYNTHROID) tablet 100 mcg, 100 mcg, Oral, ACB, Giana Bobo MD, 100 mcg at 11/14/19 9937   fluconazole (DIFLUCAN) tablet 200 mg, 200 mg, Oral, DAILY, Giana Bobo MD, 200 mg at 11/14/19 6820   senna-docusate (PERICOLACE) 8.6-50 mg per tablet 1 Tab, 1 Tab, Oral, DAILY, Preethi Solis NP, Stopped at 11/11/19 0900   albuterol-ipratropium (DUO-NEB) 2.5 MG-0.5 MG/3 ML, 3 mL, Nebulization, Q4H PRN, Randy Bobo MD 
  mexiletine (MEXITIL) capsule 200 mg, 200 mg, Oral, Q8H, AsBraden king MD, 200 mg at 11/14/19 1405   dextrose 10% infusion 0-250 mL, 0-250 mL, IntraVENous, PRN, Ofe Jamison NP, Last Rate: 750 mL/hr at 11/01/19 0705, 125 mL at 11/01/19 0705 
 
  
A/P 
  
S/P LVAD - good flows Drive-line infection - abx's 
Possible stroke - monitor Acute kidney disease - renal following 
  
Risk of morbidity and mortality - high Medical decision making - high complexity Signed By: Silvino Mullins MD 
 
 
 (0) Answers both questions correctly

## 2024-11-24 NOTE — DISCHARGE NOTE PROVIDER - NSDCFUADDAPPT_GEN_ALL_CORE_FT
APPTS ARE READY TO BE MADE: [ ] YES    Best Family or Patient Contact (if needed):    Additional Information about above appointments (if needed):    1: PCP Dr. Muller  2:   3:     Other comments or requests:    APPTS ARE READY TO BE MADE: [x] YES    Best Family or Patient Contact (if needed):    Additional Information about above appointments (if needed):    1: PCP Dr. Muller  2: Neuro Dr. Ramsay  3:     Other comments or requests:    APPTS ARE READY TO BE MADE: [x] YES    Best Family or Patient Contact (if needed):    Additional Information about above appointments (if needed):    1: PCP Dr. Muller  2: Neuro Dr. Ramsay  3:     Other comments or requests:       Appointment was scheduled by our team on the patient's behalf through the provider's office. Patient has an appointment with PCP KATI Renee. 12/6/24 at 4pm. [id number] [place] [address] APPTS ARE READY TO BE MADE: [x] YES    Best Family or Patient Contact (if needed):    Additional Information about above appointments (if needed):    1: PCP Dr. Muller  2: Neuro Dr. Ramsay  3:     Other comments or requests:       Appointment was scheduled by our team on the patient's behalf through the provider's office. Patient has an appointment with PCP KATI Renee. 12/6/24 at 4pm. [id number] [place] [address]    Provided patient with provider referral information, however patient prefers to schedule the appointment for Dr. Ramsay on their own.

## 2024-11-24 NOTE — PATIENT PROFILE ADULT - FALL HARM RISK - HARM RISK INTERVENTIONS
Assistance with ambulation/Assistance OOB with selected safe patient handling equipment/Communicate Risk of Fall with Harm to all staff/Discuss with provider need for PT consult/Monitor gait and stability/Reinforce activity limits and safety measures with patient and family/Tailored Fall Risk Interventions/Visual Cue: Yellow wristband and red socks/Bed in lowest position, wheels locked, appropriate side rails in place/Call bell, personal items and telephone in reach/Instruct patient to call for assistance before getting out of bed or chair/Non-slip footwear when patient is out of bed/[place] to call system/Physically safe environment - no spills, clutter or unnecessary equipment/Purposeful Proactive Rounding/Room/bathroom lighting operational, light cord in reach

## 2024-11-24 NOTE — H&P ADULT - HISTORY OF PRESENT ILLNESS
This is a 64F with history of Multiple Sclerosis currently on Ocrevus (next dose due on 11/25/24) who presents to the hospital initially with complaints of new onset midsternal chest pain x1 day. Reports that she developed a soreness in the middle of her chest on Friday that had persisted and therefore had come to the ED for evaluation. Reports palpitations associated with the chest pain but denies SOB, dizziness, or LOC. States that the pain is worse with movements. Denies a pleuritic component to the pain, denies radiation of the pain. Does report having a cough for the past 1 week with minimal sputum production. Denies fevers/chills, denies URI complaints. Also reports new edema of the L lower leg, usually has swelling of the L ankle but now has swelling of the L calf which is new for her.     On arrival to the ED her vitals were T 97.6, P 88, /79, RR 16, o2 sat 100% RA. Her initial lab work, imaging, and EKG did not show acute abnormalities.     While in the ED she started to have new onset L leg numbness and weakness for which a code stroke was called. She had a CTH and CTA H/N that did not show acute findings. Neurology had evaluated patient and offered tenecteplase but the patient and her family declined it. She was then admitted to medicine for further management. While in the ED she had received ofirmev 1g.

## 2024-11-24 NOTE — OCCUPATIONAL THERAPY INITIAL EVALUATION ADULT - PERTINENT HX OF CURRENT PROBLEM, REHAB EVAL
As per H&P: "  Admit dx: Chest pain, code stroke As per H&P: "64y (1960) woman with a PMHx significant for multiple sclerosis, migraines, lumbar radiculopathy who initially presented to the ED due to substernal nonradiating chest pain. Patient was loaded with aspirin and evaluated by ED team with unrevealing workup, and improvement of her chest pain. While in the ED at approximately 0130, patient complained to her sister of new onset of left arm and leg numbness, for which code stroke was called."  Admit dx: Chest pain, code stroke

## 2024-11-24 NOTE — PROGRESS NOTE ADULT - PROBLEM SELECTOR PLAN 4
- Reports having L ankle swelling at baseline, now with new L calf swelling  - On exam noted to have b/l non-pitting edema but worse on L and +TTP on l, cold compared to R  - No erythema/warmth to suggest cellulitis  - D-Dimer in ED negative    PLAN  -> Check US duplex venous for DVT r/o  -> Encourage leg elevation

## 2024-11-24 NOTE — CONSULT NOTE ADULT - ASSESSMENT
Assessment: 64F PMHx multiple sclerosis on ocrelizumab, migraines initially presented for substernal chest pain. While in ED, noted sudden onset left side numbness for which code stroke was called. Initial VS in ED: /73. Exam: Left upper and lower extremity sensory deficit to light touch, pinprick, vibration/temperature. Mild left lower extremity drift, with decreased strength in knee flexion/extension and dorsiflexion/plantarflexion. Hyporeflexia on left side compared to right.  Extensive discussion held with patient and family at the bedside regarding administration of tenecteplase. Patient and family are electing not to receive tenecteplase.    pre-mRS: 3  LKN: 11/24 @0130  NIHSS: 2 (L sensory, LLE drift)      Tenecteplase refusal: Risks, benefits, and adverse reactions associated with Tenecteplase including hemorrhagic stroke were discussed with patient and family members in detail. Patient and family members refused tenecteplase due to symptoms not significantly disabling to patient, likelihood that symptoms are due to MS flare which would not improve with tenecteplase treatment.  Not thrombectomy candidate due to: No large vessel occlusion    Impression: Left arm and leg sensory>motor deficit, which may be due to acute ischemic stroke vs MS flare.    Plan:    [ ] MRI brain and cervical spine w/ and without contrast:  [ ] Basic labs: CBC, CMP, coagulation panel and troponin,  HgbA1C, lipid panel, UA  [ ] Aspirin 81 mg daily for now (if MRI negative for stroke would discontinue)  [ ] Atorvastatin 80mg (titrate to LDL < 70) (if MRI negative for stroke and not indicated by ASCVD score would discontinue)  [] Lovenox SQ for DVT prophylaxis   [] NPO unless passes dysphagia screen; swallow eval if fails  [] Keep BP permissive up to 220/120 for 48 hours followed by gradual normotension over 2-3 days   [] Telemonitoring  [] Neurological checks and vital signs q4h  [] BGM goals 140-180  [] PT/OT    Case discussed with telestroke attending Dr. Wright. To be formally staffed with attending on AM rounds. Case and plan not final until Attending attestation.       Assessment: 64F PMHx multiple sclerosis on ocrelizumab, migraines initially presented for substernal chest pain. While in ED, noted sudden onset left side numbness for which code stroke was called. Initial VS in ED: /73. Exam: Decreased sensation to light touch, pinprick, temperature in left lower>upper extremities, distal>proximal. Mild left lower extremity drift, with decreased strength in knee flexion/extension and dorsiflexion/plantarflexion. Hyporeflexia on left side compared to right.   CT head and CTA/CTP are unremarkable.  Extensive discussion held with patient and family at the bedside regarding administration of tenecteplase. Patient and family are electing not to receive tenecteplase.    pre-mRS: 3  LKN: 11/24 @0130  NIHSS: 2 (L sensory, LLE drift)      Tenecteplase refusal: Risks, benefits, and adverse reactions associated with Tenecteplase including hemorrhagic stroke were discussed with patient and family members in detail. Patient and family members refused tenecteplase due to symptoms not significantly disabling to patient, likelihood that symptoms are due to MS flare which would not improve with tenecteplase treatment.  Not thrombectomy candidate due to: No large vessel occlusion    Impression: Left arm and leg sensory>motor deficit due to right brain dysfunction, which may be due to acute ischemic stroke vs MS flare.    Plan:    [ ] MRI brain and cervical spine w/ and without contrast:  [ ] Basic labs: CBC, CMP, coagulation panel and troponin,  HgbA1C, lipid panel, UA  [ ] Aspirin 81 mg daily for now (if MRI negative for stroke would discontinue)  [ ] Atorvastatin 80mg (titrate to LDL < 70) (if MRI negative for stroke and not indicated by ASCVD score would discontinue)  [] Lovenox SQ for DVT prophylaxis   [] NPO unless passes dysphagia screen; swallow eval if fails  [] Keep BP permissive up to 220/120 for 48 hours followed by gradual normotension over 2-3 days   [] Telemonitoring  [] Neurological checks and vital signs q4h  [] BGM goals 140-180  [] PT/OT  [] Attempt to obtain collateral from patient's neurologist when feasible    Case discussed with telestroke attending Dr. Wright. To be formally staffed with attending on AM rounds. Case and plan not final until Attending attestation.

## 2024-11-24 NOTE — ED ADULT NURSE NOTE - CHIEF COMPLAINT QUOTE
Pt complains of R sided, non radiating, stabbing chest pain that started about 36 hours ago after eating pizza. History of MS unable to ambulate. EMS gave 324 mg of ASA.

## 2024-11-24 NOTE — STROKE CODE NOTE - MRS SCORE
(3) Moderate disability. Requires some help, but able to walk unassisted. (4) Moderately severe disabilty.  Unable to attend to own bodily needs without assistance, and unable to walk unassisted.

## 2024-11-24 NOTE — H&P ADULT - PROBLEM SELECTOR PLAN 6
DVT ppx - Lovenox  Diet - DASH soft and bite sized diet pending S&S eval  Activity - OOB with assistance, increase as tolerated, PT/OT evals    Fall, aspiration, and seizure precautions

## 2024-11-24 NOTE — CONSULT NOTE ADULT - SUBJECTIVE AND OBJECTIVE BOX
Neurology - Consult Note    -  Spectra: [id number] ([facility]), [id number] ([facility]  -    HPI: Patient MERT NAIR is a 64y (1960) woman with a PMHx significant for multiple sclerosis, migraines who initially presented to the ED due to substernal nonradiating chest pain. Patient was loaded with aspirin and evaluated by ED team with unrevealing workup, and improvement of her chest pain. While in the ED at approximately 0130, patient complained to her sister of new onset of left arm and leg numbness, for which code stroke was called. Patient denies headache, vision changes, or left-sided weakness.     Patient has longstanding MS for which she follows with Dr. Remberto Ramsay at [facility]. Patient was last seen by her neurologist two days ago. She is on ocrelizumab infusions every 6 months, her last infusion was 4/2024. She has limited mobility at baseline due to her MS, ambulates with walker at home and wheelchair when going out of the home. She has a full time aide at home and lives with her sister who helps to look after her. She has no known recent MS flares, although reports occasional periods where she feels very weak for 24 hour period after which she returns to baseline. Per family, patient has never been admitted to the hospital for a flare. No prior history of unilateral weakness due to her MS.     Review of Systems:    CONSTITUTIONAL: No fevers or chills  EYES AND ENT: No visual changes or no throat pain   NECK: No pain or stiffness  RESPIRATORY: No hemoptysis or shortness of breath  CARDIOVASCULAR: No chest pain or palpitations  GASTROINTESTINAL: No melena or hematochezia  GENITOURINARY: No dysuria or hematuria  NEUROLOGICAL: +As stated in HPI above  SKIN: No itching, burning, rashes, or lesions   All other review of systems is negative unless indicated above.    Allergies:  penicillin (Hives)      PMHx/PSHx/Family Hx: As above, otherwise see below   MS (multiple sclerosis)    Migraine    MS (multiple sclerosis)        Social Hx:  No current use of tobacco, alcohol, or illicit drugs  Lives with sister. Has 24 hour aide. Ambulates with walker at home, uses a wheelchair for going out of the house.    Medications:  MEDICATIONS  (STANDING):  aluminum hydroxide/magnesium hydroxide/simethicone Suspension 30 milliLiter(s) Oral once    MEDICATIONS  (PRN):      Vitals:  T(C): 36.6 (11-24-24 @ 03:05), Max: 36.6 (11-24-24 @ 03:05)  HR: 71 (11-24-24 @ 03:05) (71 - 88)  BP: 123/73 (11-24-24 @ 03:05) (119/79 - 123/73)  RR: 16 (11-24-24 @ 03:05) (16 - 16)  SpO2: 99% (11-24-24 @ 03:05) (99% - 100%)    Physical Examination:   General - NAD, pleasant, cooperative   Extremities- Joint deformities noted in bilateral hands and R elbow    Neurologic Exam:  Mental status - Awake, Alert, Oriented to person, place, and time. Speech fluent, repetition and naming intact. Follows simple and complex commands.    Cranial nerves:  CN II: Visual fields are full to confrontation. Pupils are 4 mm and briskly reactive to light.   CN III, IV, VI: EOMI, no nystagmus, no ptosis  CN V: Facial sensation is intact to pinprick in all 3 divisions bilaterally.  CN VII: Face is symmetric with normal eye closure and smile.  CN VII: Hearing is normal to rubbing fingers  CN IX, X: Palate elevates symmetrically. Phonation is normal.  CN XI: Head turning and shoulder shrug are intact  CN XII: Tongue is midline with normal movements and no atrophy.    Motor - Normal bulk and tone throughout. No pronator drift of out-stretched arms.  Strength testing  Limited exam of LUE due to prior rotator cuff surgery            Deltoid      Biceps      Triceps     Wrist Extension    Wrist Flexion     Interossei         R            4+            5               5                     5                  5                        5                 5  L             4              5               5                     5                  5                        5                 5              Hip Flexion    Hip Extension    Knee Flexion    Knee Extension    Dorsiflexion    Plantar Flexion  R             4+                  4+                   4+                    4+                    4+                   4+  L              4+                  4+                   4-                    4                       4                     4    Sensation - Decreased sensation to light touch, pinprick, temperature in left upper and lower extremities. Proprioception intact.    DTR's -             Biceps      Triceps     Brachioradialis      Patellar    Ankle    Toes/plantar response  R             2+             2+                  2+               2+          2+                 Down  L              1+             1+                 1+               1+           1+                 Down    Coordination - There is no dysmetria on finger-to-nose. There are no abnormal or extraneous movements.     Gait and station - Patient has unsteadiness with standing, requires bilateral support. Steppage gait with very small steps.    NIHSS: 2 (L sensory, LLE drift)    Labs:                        14.9   5.06  )-----------( 265      ( 24 Nov 2024 00:06 )             45.4     11-24    140  |  104  |  13  ----------------------------<  102[H]  4.2   |  23  |  0.55    Ca    10.0      24 Nov 2024 00:06    TPro  7.3  /  Alb  4.3  /  TBili  0.6  /  DBili  x   /  AST  30  /  ALT  34[H]  /  AlkPhos  94  11-24    CAPILLARY BLOOD GLUCOSE      POCT Blood Glucose.: 91 mg/dL (24 Nov 2024 03:13)    LIVER FUNCTIONS - ( 24 Nov 2024 00:06 )  Alb: 4.3 g/dL / Pro: 7.3 g/dL / ALK PHOS: 94 U/L / ALT: 34 U/L / AST: 30 U/L / GGT: x             Radiology:  < from: CT Brain Stroke Protocol (11.24.24 @ 02:56) >  IMPRESSION:  No acute intracranial hemorrhage, mass effect, or midline shift.    < end of copied text >  < from: CT Brain Perfusion Maps Stroke (11.24.24 @ 02:57) >  IMPRESSION:    CT PERFUSION:  No evidence of acute core infarct or ischemic penumbra. MRI brain   recommended if symptoms persist.    CTA NECK:  No evidence of significant stenosis or occlusion.    CTA HEAD:  No large vessel occlusion, significant stenosis or vascular abnormality   identified.      < end of copied text >     Neurology - Consult Note    -  Spectra: [phone] ([facility]), [phone] ([facility])  -    HPI: Patient MERT NAIR is a 64y (1960) woman with a PMHx significant for multiple sclerosis, migraines who initially presented to the ED due to substernal nonradiating chest pain. Patient was loaded with aspirin and evaluated by ED team with unrevealing workup, and improvement of her chest pain. While in the ED at approximately 0130, patient complained to her sister of new onset of left arm and leg numbness, for which code stroke was called. Patient denies headache, vision changes, or left-sided weakness.     Patient has longstanding MS for which she follows with Dr. Remberto Ramsay at [facility]. Patient was last seen by her neurologist two days ago. She is on ocrelizumab infusions every 6 months, her last infusion was 4/2024. She has limited mobility at baseline due to her MS, ambulates with walker at home and wheelchair when going out of the home. She has a full time aide at home and lives with her sister who helps to look after her. She has no known recent MS flares, although reports occasional periods where she feels very weak for 24 hour period after which she returns to baseline. Per family, patient has never been admitted to the hospital for a flare. No prior history of unilateral weakness due to her MS.     Review of Systems:    CONSTITUTIONAL: No fevers or chills  EYES AND ENT: No visual changes or no throat pain   NECK: No pain or stiffness  RESPIRATORY: No hemoptysis or shortness of breath  CARDIOVASCULAR: No chest pain or palpitations  GASTROINTESTINAL: No melena or hematochezia  GENITOURINARY: No dysuria or hematuria  NEUROLOGICAL: +As stated in HPI above  SKIN: No itching, burning, rashes, or lesions   All other review of systems is negative unless indicated above.    Allergies:  penicillin (Hives)      PMHx/PSHx/Family Hx: As above, otherwise see below   MS (multiple sclerosis)    Migraine    MS (multiple sclerosis)        Social Hx:  No current use of tobacco, alcohol, or illicit drugs  Lives with sister. Has 24 hour aide. Ambulates with walker at home, uses a wheelchair for going out of the house.    Medications:  MEDICATIONS  (STANDING):  aluminum hydroxide/magnesium hydroxide/simethicone Suspension 30 milliLiter(s) Oral once    MEDICATIONS  (PRN):      Vitals:  T(C): 36.6 (11-24-24 @ 03:05), Max: 36.6 (11-24-24 @ 03:05)  HR: 71 (11-24-24 @ 03:05) (71 - 88)  BP: 123/73 (11-24-24 @ 03:05) (119/79 - 123/73)  RR: 16 (11-24-24 @ 03:05) (16 - 16)  SpO2: 99% (11-24-24 @ 03:05) (99% - 100%)    Physical Examination:   General - NAD, pleasant, cooperative   Extremities- Joint deformities noted in bilateral hands and R elbow    Neurologic Exam:  Mental status - Awake, Alert, Oriented to person, place, and time. Speech fluent, repetition and naming intact. Follows simple and complex commands.    Cranial nerves:  CN II: Visual fields are full to confrontation. Pupils are 4 mm and briskly reactive to light.   CN III, IV, VI: EOMI, no nystagmus, no ptosis  CN V: Facial sensation is intact to pinprick in all 3 divisions bilaterally.  CN VII: Face is symmetric with normal eye closure and smile.  CN VII: Hearing is normal to rubbing fingers  CN IX, X: Palate elevates symmetrically. Phonation is normal.  CN XI: Head turning and shoulder shrug are intact  CN XII: Tongue is midline with normal movements and no atrophy.    Motor - Normal bulk and tone throughout. No pronator drift of out-stretched arms.  Strength testing  Limited exam of LUE due to prior rotator cuff surgery            Deltoid      Biceps      Triceps     Wrist Extension    Wrist Flexion     Interossei         R            4+            5               5                     5                  5                        5                 5  L             4              5               5                     5                  5                        5                 5              Hip Flexion    Hip Extension    Knee Flexion    Knee Extension    Dorsiflexion    Plantar Flexion  R             4+                  4+                   4+                    4+                    4+                   4+  L              4+                  4+                   4-                    4                       4                     4    Sensation - Decreased sensation to light touch, pinprick, temperature in left lower>upper extremities, distal>proximal. Proprioception intact.    DTR's -             Biceps      Triceps     Brachioradialis      Patellar    Ankle    Toes/plantar response  R             2+             2+                  2+               2+          2+                 Down  L              1+             1+                 1+               1+           1+                 Down    Coordination - There is no dysmetria on finger-to-nose. There are no abnormal or extraneous movements.     Gait and station - Patient has unsteadiness with standing, requires bilateral support. Steppage gait with very small steps.    NIHSS: 2 (L sensory, LLE drift)    Labs:                        14.9   5.06  )-----------( 265      ( 24 Nov 2024 00:06 )             45.4     11-24    140  |  104  |  13  ----------------------------<  102[H]  4.2   |  23  |  0.55    Ca    10.0      24 Nov 2024 00:06    TPro  7.3  /  Alb  4.3  /  TBili  0.6  /  DBili  x   /  AST  30  /  ALT  34[H]  /  AlkPhos  94  11-24    CAPILLARY BLOOD GLUCOSE      POCT Blood Glucose.: 91 mg/dL (24 Nov 2024 03:13)    LIVER FUNCTIONS - ( 24 Nov 2024 00:06 )  Alb: 4.3 g/dL / Pro: 7.3 g/dL / ALK PHOS: 94 U/L / ALT: 34 U/L / AST: 30 U/L / GGT: x             Radiology:  < from: CT Brain Stroke Protocol (11.24.24 @ 02:56) >  IMPRESSION:  No acute intracranial hemorrhage, mass effect, or midline shift.    < end of copied text >  < from: CT Brain Perfusion Maps Stroke (11.24.24 @ 02:57) >  IMPRESSION:    CT PERFUSION:  No evidence of acute core infarct or ischemic penumbra. MRI brain   recommended if symptoms persist.    CTA NECK:  No evidence of significant stenosis or occlusion.    CTA HEAD:  No large vessel occlusion, significant stenosis or vascular abnormality   identified.      < end of copied text >     Neurology - Consult Note    -  Spectra: [phone] ([facility]), [phone] ([facility])  -    HPI: Patient MERT NAIR is a 64y (1960) woman with a PMHx significant for multiple sclerosis, migraines who initially presented to the ED due to substernal nonradiating chest pain. Patient was loaded with aspirin and evaluated by ED team with unrevealing workup, and improvement of her chest pain. While in the ED at approximately 0130, patient complained to her sister of new onset of left arm and leg numbness, for which code stroke was called. Patient denies headache, vision changes, or left-sided weakness.     Patient has longstanding MS for which she follows with Dr. Remberto Ramsay at [facility]. Patient was last seen by her neurologist two days ago. She is on ocrelizumab infusions every 6 months, her last infusion was 4/2024. She has limited mobility at baseline due to her MS, ambulates with walker at home and wheelchair when going out of the home. She has a full time aide at home and lives with her sister who helps to look after her. She has no known recent MS flares, although reports occasional periods where she feels very weak for 24 hour period after which she returns to baseline. Per family, patient has never been admitted to the hospital for a flare. No prior history of unilateral weakness due to her MS.     Per chart review: "Last MRI brain 4/17/24 "Moderate/extensive supra-, infra tentorial signal consistent with demyelination/MS. No definite active lesion."    Review of Systems:    CONSTITUTIONAL: No fevers or chills  EYES AND ENT: No visual changes or no throat pain   NECK: No pain or stiffness  RESPIRATORY: No hemoptysis or shortness of breath  CARDIOVASCULAR: No chest pain or palpitations  GASTROINTESTINAL: No melena or hematochezia  GENITOURINARY: No dysuria or hematuria  NEUROLOGICAL: +As stated in HPI above  SKIN: No itching, burning, rashes, or lesions   All other review of systems is negative unless indicated above.    Allergies:  penicillin (Hives)      PMHx/PSHx/Family Hx: As above, otherwise see below   MS (multiple sclerosis)    Migraine    MS (multiple sclerosis)        Social Hx:  No current use of tobacco, alcohol, or illicit drugs  Lives with sister. Has 24 hour aide. Ambulates with walker at home, uses a wheelchair for going out of the house.    Medications:  MEDICATIONS  (STANDING):  aluminum hydroxide/magnesium hydroxide/simethicone Suspension 30 milliLiter(s) Oral once    MEDICATIONS  (PRN):      Vitals:  T(C): 36.6 (11-24-24 @ 03:05), Max: 36.6 (11-24-24 @ 03:05)  HR: 71 (11-24-24 @ 03:05) (71 - 88)  BP: 123/73 (11-24-24 @ 03:05) (119/79 - 123/73)  RR: 16 (11-24-24 @ 03:05) (16 - 16)  SpO2: 99% (11-24-24 @ 03:05) (99% - 100%)    Physical Examination:   General - NAD, pleasant, cooperative   Extremities- Joint deformities noted in bilateral hands and R elbow    Neurologic Exam:  Mental status - Awake, Alert, Oriented to person, place, and time. Speech fluent, repetition and naming intact. Follows simple and complex commands.    Cranial nerves:  CN II: Visual fields are full to confrontation. Pupils are 4 mm and briskly reactive to light.   CN III, IV, VI: EOMI, no nystagmus, no ptosis  CN V: Facial sensation is intact to pinprick in all 3 divisions bilaterally.  CN VII: Face is symmetric with normal eye closure and smile.  CN VII: Hearing is normal to rubbing fingers  CN IX, X: Palate elevates symmetrically. Phonation is normal.  CN XI: Head turning and shoulder shrug are intact  CN XII: Tongue is midline with normal movements and no atrophy.    Motor - Normal bulk and tone throughout. No pronator drift of out-stretched arms.  Strength testing  Limited exam of LUE due to prior rotator cuff surgery            Deltoid      Biceps      Triceps     Wrist Extension    Wrist Flexion     Interossei         R            4+            5               5                     5                  5                        5                 5  L             4              5               5                     5                  5                        5                 5              Hip Flexion    Hip Extension    Knee Flexion    Knee Extension    Dorsiflexion    Plantar Flexion  R             4+                  4+                   4+                    4+                    4+                   4+  L              4+                  4+                   4-                    4                       4                     4    Sensation - Decreased sensation to light touch, pinprick, temperature in left lower>upper extremities, distal>proximal. Proprioception intact.    DTR's -             Biceps      Triceps     Brachioradialis      Patellar    Ankle    Toes/plantar response  R             2+             2+                  2+               2+          2+                 Down  L              1+             1+                 1+               1+           1+                 Down    Coordination - There is no dysmetria on finger-to-nose. There are no abnormal or extraneous movements.     Gait and station - Patient has unsteadiness with standing, requires bilateral support. Steppage gait with very small steps.    NIHSS: 2 (L sensory, LLE drift)    Labs:                        14.9   5.06  )-----------( 265      ( 24 Nov 2024 00:06 )             45.4     11-24    140  |  104  |  13  ----------------------------<  102[H]  4.2   |  23  |  0.55    Ca    10.0      24 Nov 2024 00:06    TPro  7.3  /  Alb  4.3  /  TBili  0.6  /  DBili  x   /  AST  30  /  ALT  34[H]  /  AlkPhos  94  11-24    CAPILLARY BLOOD GLUCOSE      POCT Blood Glucose.: 91 mg/dL (24 Nov 2024 03:13)    LIVER FUNCTIONS - ( 24 Nov 2024 00:06 )  Alb: 4.3 g/dL / Pro: 7.3 g/dL / ALK PHOS: 94 U/L / ALT: 34 U/L / AST: 30 U/L / GGT: x             Radiology:  < from: CT Brain Stroke Protocol (11.24.24 @ 02:56) >  IMPRESSION:  No acute intracranial hemorrhage, mass effect, or midline shift.    < end of copied text >  < from: CT Brain Perfusion Maps Stroke (11.24.24 @ 02:57) >  IMPRESSION:    CT PERFUSION:  No evidence of acute core infarct or ischemic penumbra. MRI brain   recommended if symptoms persist.    CTA NECK:  No evidence of significant stenosis or occlusion.    CTA HEAD:  No large vessel occlusion, significant stenosis or vascular abnormality   identified.      < end of copied text >     Neurology - Consult Note    -  Spectra: [phone] ([facility]), [phone] ([facility])  -    HPI: Patient MERT NAIR is a 64y (1960) woman with a PMHx significant for multiple sclerosis, migraines, lumbar radiculopathy who initially presented to the ED due to substernal nonradiating chest pain. Patient was loaded with aspirin and evaluated by ED team with unrevealing workup, and improvement of her chest pain. While in the ED at approximately 0130, patient complained to her sister of new onset of left arm and leg numbness, for which code stroke was called. Patient denies headache, vision changes, or left-sided weakness.     Patient has longstanding MS for which she follows with Dr. Remberto Ramsay at [facility]. Patient was last seen by her neurologist two days ago. She is on ocrelizumab infusions every 6 months, her last infusion was 4/2024. She has limited mobility at baseline due to her MS, ambulates with walker at home and wheelchair when going out of the home. She has a full time aide at home and lives with her sister who helps to look after her. She has no known recent MS flares, although reports occasional periods where she feels very weak for 24 hour period after which she returns to baseline. Per family, patient has never been admitted to the hospital for a flare. No prior history of unilateral weakness due to her MS.     Per chart review: "Last MRI brain 4/17/24 "Moderate/extensive supra-, infra tentorial signal consistent with demyelination/MS. No definite active lesion."    Review of Systems:    CONSTITUTIONAL: No fevers or chills  EYES AND ENT: No visual changes or no throat pain   NECK: No pain or stiffness  RESPIRATORY: No hemoptysis or shortness of breath  CARDIOVASCULAR: No chest pain or palpitations  GASTROINTESTINAL: No melena or hematochezia  GENITOURINARY: No dysuria or hematuria  NEUROLOGICAL: +As stated in HPI above  SKIN: No itching, burning, rashes, or lesions   All other review of systems is negative unless indicated above.    Allergies:  penicillin (Hives)      PMHx/PSHx/Family Hx: As above, otherwise see below   MS (multiple sclerosis)    Migraine    MS (multiple sclerosis)        Social Hx:  No current use of tobacco, alcohol, or illicit drugs  Lives with sister. Has 24 hour aide. Ambulates with walker at home, uses a wheelchair for going out of the house.    Medications:  MEDICATIONS  (STANDING):  aluminum hydroxide/magnesium hydroxide/simethicone Suspension 30 milliLiter(s) Oral once    MEDICATIONS  (PRN):      Vitals:  T(C): 36.6 (11-24-24 @ 03:05), Max: 36.6 (11-24-24 @ 03:05)  HR: 71 (11-24-24 @ 03:05) (71 - 88)  BP: 123/73 (11-24-24 @ 03:05) (119/79 - 123/73)  RR: 16 (11-24-24 @ 03:05) (16 - 16)  SpO2: 99% (11-24-24 @ 03:05) (99% - 100%)    Physical Examination:   General - NAD, pleasant, cooperative   Extremities- Joint deformities noted in bilateral hands and R elbow    Neurologic Exam:  Mental status - Awake, Alert, Oriented to person, place, and time. Speech fluent, repetition and naming intact. Follows simple and complex commands.    Cranial nerves:  CN II: Visual fields are full to confrontation. Pupils are 4 mm and briskly reactive to light.   CN III, IV, VI: EOMI, no nystagmus, no ptosis  CN V: Facial sensation is intact to pinprick in all 3 divisions bilaterally.  CN VII: Face is symmetric with normal eye closure and smile.  CN VII: Hearing is normal to rubbing fingers  CN IX, X: Palate elevates symmetrically. Phonation is normal.  CN XI: Head turning and shoulder shrug are intact  CN XII: Tongue is midline with normal movements and no atrophy.    Motor - Normal bulk and tone throughout. No pronator drift of out-stretched arms.  Strength testing  Limited exam of LUE due to prior rotator cuff surgery            Deltoid      Biceps      Triceps     Wrist Extension    Wrist Flexion     Interossei         R            4+            5               5                     5                  5                        5                 5  L             4              5               5                     5                  5                        5                 5              Hip Flexion    Hip Extension    Knee Flexion    Knee Extension    Dorsiflexion    Plantar Flexion  R             4+                  4+                   4+                    4+                    4+                   4+  L              4+                  4+                   4-                    4                       4                     4    Sensation - Decreased sensation to light touch, pinprick, temperature in left lower>upper extremities, distal>proximal. Proprioception intact.    DTR's -             Biceps      Triceps     Brachioradialis      Patellar    Ankle    Toes/plantar response  R             2+             2+                  2+               2+          2+                 Down  L              1+             1+                 1+               1+           1+                 Down    Coordination - There is no dysmetria on finger-to-nose. There are no abnormal or extraneous movements.     Gait and station - Patient has unsteadiness with standing, requires bilateral support. Steppage gait with very small steps.    NIHSS: 2 (L sensory, LLE drift)    Labs:                        14.9   5.06  )-----------( 265      ( 24 Nov 2024 00:06 )             45.4     11-24    140  |  104  |  13  ----------------------------<  102[H]  4.2   |  23  |  0.55    Ca    10.0      24 Nov 2024 00:06    TPro  7.3  /  Alb  4.3  /  TBili  0.6  /  DBili  x   /  AST  30  /  ALT  34[H]  /  AlkPhos  94  11-24    CAPILLARY BLOOD GLUCOSE      POCT Blood Glucose.: 91 mg/dL (24 Nov 2024 03:13)    LIVER FUNCTIONS - ( 24 Nov 2024 00:06 )  Alb: 4.3 g/dL / Pro: 7.3 g/dL / ALK PHOS: 94 U/L / ALT: 34 U/L / AST: 30 U/L / GGT: x             Radiology:  < from: CT Brain Stroke Protocol (11.24.24 @ 02:56) >  IMPRESSION:  No acute intracranial hemorrhage, mass effect, or midline shift.    < end of copied text >  < from: CT Brain Perfusion Maps Stroke (11.24.24 @ 02:57) >  IMPRESSION:    CT PERFUSION:  No evidence of acute core infarct or ischemic penumbra. MRI brain   recommended if symptoms persist.    CTA NECK:  No evidence of significant stenosis or occlusion.    CTA HEAD:  No large vessel occlusion, significant stenosis or vascular abnormality   identified.      < end of copied text >

## 2024-11-24 NOTE — H&P ADULT - NSHPREVIEWOFSYSTEMS_GEN_ALL_CORE
REVIEW OF SYSTEMS:    CONSTITUTIONAL: No weakness, fevers or chills  EYES: No visual changes or eye discharge  ENT: No rhinorrhea or sore throat  NECK: No pain or stiffness  RESPIRATORY: + cough with minimal sputum, No wheezing, No hemoptysis; No shortness of breath  CARDIOVASCULAR: +Midsternal non-radiating chest pain with associated palpitations; +L leg swelling  GASTROINTESTINAL: No abdominal or epigastric pain. No nausea, vomiting, or hematemesis; No diarrhea or constipation. No melena or hematochezia.  BACK: No back pain  GENITOURINARY: No dysuria, frequency or hematuria  NEUROLOGICAL: +L leg numbness and weakness, No other focal numbness or weakness  SKIN: No itching, burning, rashes, or lesions

## 2024-11-24 NOTE — PHYSICAL THERAPY INITIAL EVALUATION ADULT - GAIT DEVIATIONS NOTED, PT EVAL
decreased mark anthony/increased time in double stance/decreased step length/decreased stride length/decreased weight-shifting ability

## 2024-11-24 NOTE — H&P ADULT - PROBLEM SELECTOR PLAN 3
- Cough x1 week with minimal sputum, no SOB, no hemoptysis, no fevers/chills  - Is on ocrevus which can lower the immune system but currently no septic vitals, no leukocytosis, afebrile here  - CXR with clear lungs    -> Check COVID/Flu/RSV swab  -> Monitor symptomatically for now

## 2024-11-24 NOTE — PATIENT PROFILE ADULT - FUNCTIONAL ASSESSMENT - BASIC MOBILITY 6.
1-calculated by average/Not able to assess (calculate score using [facility] averaging method)

## 2024-11-24 NOTE — PHYSICAL THERAPY INITIAL EVALUATION ADULT - RANGE OF MOTION EXAMINATION, REHAB EVAL
bilateral upper extremity ROM was WFL (within functional limits)/Left LE ROM was WFL (within functional limits) bilateral upper extremity ROM was WFL (within functional limits)/bilateral lower extremity ROM was WFL (within functional limits)

## 2024-11-24 NOTE — DISCHARGE NOTE PROVIDER - NSDCCPTREATMENT_GEN_ALL_CORE_FT
PRINCIPAL PROCEDURE  Procedure: CT angiogram head and neck vessels w contrast  Findings and Treatment: TECHNIQUE: CT perfusion and CTA of the head and neck were performed   following the intravenous administration of IV contrast. MIP   reconstructions were performed on a separate workstation and reviewed.   RAPID software was utilized for perfusion analysis.  FINDINGS:  CT PERFUSION:  TECHNICAL LIMITATIONS: Mild motion artifact.  CBF<30%/CORE INFARCTION: 11 mL  TMAX>6s/UNDERPERFUSED TISSUE: 8 mL  MISMATCH VOLUME/TISSUE AT RISK: -3 mL  MISMATCH RATIO: 0.7.  MISCELLANEOUS: Patchy areas of signal abnormality present in the anterior   aspect of the frontal lobes does not correspond to any vascular territory   and is likely related to registration artifact from increased extra-axial   spaces in those regions.  CTA NECK:  AORTIC ARCH AND VISUALIZED GREAT VESSELS: Within normal limits.  RIGHT:  COMMON CAROTID ARTERY: No significant stenosis to the carotid bifurcation.  INTERNAL CAROTID ARTERY: No significant stenosis based on NASCET criteria.  VERTEBRAL ARTERY: Normal in course and caliber to the intracranial   circulation.  LEFT:  COMMON CAROTID ARTERY: No significant stenosis to the carotid bifurcation.  INTERNAL CAROTID ARTERY: No significant stenosis based on NASCET criteria.  VERTEBRAL ARTERY: Normal in course and caliber to the intracranial   circulation.  VISUALIZED LUNGS: Clear.  MISCELLANEOUS: None.  CAROTID STENOSIS REFERENCE: Percent (%) stenosis is expressed in terms of   NASCET Criteria. (NASCET = 100x1-(N/D)). N=greatest narrowing. D=normal   distal diameter - MILD = <50% stenosis. - MODERATE = 50-69% stenosis. -   SEVERE = 70-89% stenosis. - HAIRLINE/CRITICAL = 90-99% stenosis. -   OCCLUDED = 100% stenosis.  CTAHEAD:  INTERNAL CAROTID ARTERIES: Bilateral petrous, precavernous, cavernous,   and supraclinoid regions are patent without significant stenosis.  [place]: No aneurysm identified. Tiny aneurysms can be beyond   the resolution of CTA technique.  ANTERIOR CEREBRAL ARTERIES: No significant stenosis or occlusion.  MIDDLE CEREBRAL ARTERIES: No significant stenosis or oc      SECONDARY PROCEDURE  Procedure: MRI  Findings and Treatment:      PRINCIPAL PROCEDURE  Procedure: CT angiogram head and neck vessels w contrast  Findings and Treatment: TECHNIQUE: CT perfusion and CTA of the head and neck were performed   following the intravenous administration of IV contrast. MIP   reconstructions were performed on a separate workstation and reviewed.   RAPID software was utilized for perfusion analysis.  FINDINGS:  CT PERFUSION:  TECHNICAL LIMITATIONS: Mild motion artifact.  CBF<30%/CORE INFARCTION: 11 mL  TMAX>6s/UNDERPERFUSED TISSUE: 8 mL  MISMATCH VOLUME/TISSUE AT RISK: -3 mL  MISMATCH RATIO: 0.7.  MISCELLANEOUS: Patchy areas of signal abnormality present in the anterior   aspect of the frontal lobes does not correspond to any vascular territory   and is likely related to registration artifact from increased extra-axial   spaces in those regions.  CTA NECK:  AORTIC ARCH AND VISUALIZED GREAT VESSELS: Within normal limits.  RIGHT:  COMMON CAROTID ARTERY: No significant stenosis to the carotid bifurcation.  INTERNAL CAROTID ARTERY: No significant stenosis based on NASCET criteria.  VERTEBRAL ARTERY: Normal in course and caliber to the intracranial   circulation.  LEFT:  COMMON CAROTID ARTERY: No significant stenosis to the carotid bifurcation.  INTERNAL CAROTID ARTERY: No significant stenosis based on NASCET criteria.  VERTEBRAL ARTERY: Normal in course and caliber to the intracranial   circulation.  VISUALIZED LUNGS: Clear.  MISCELLANEOUS: None.  CAROTID STENOSIS REFERENCE: Percent (%) stenosis is expressed in terms of   NASCET Criteria. (NASCET = 100x1-(N/D)). N=greatest narrowing. D=normal   distal diameter - MILD = <50% stenosis. - MODERATE = 50-69% stenosis. -   SEVERE = 70-89% stenosis. - HAIRLINE/CRITICAL = 90-99% stenosis. -   OCCLUDED = 100% stenosis.  CTAHEAD:  INTERNAL CAROTID ARTERIES: Bilateral petrous, precavernous, cavernous,   and supraclinoid regions are patent without significant stenosis.  [place]: No aneurysm identified. Tiny aneurysms can be beyond   the resolution of CTA technique.  ANTERIOR CEREBRAL ARTERIES: No significant stenosis or occlusion.  MIDDLE CEREBRAL ARTERIES: No significant stenosis or oc      SECONDARY PROCEDURE  Procedure: MRI  Findings and Treatment: No acute infarct or other acute abnormality. There are numerous foci of   T2/FLAIR hyperintense signal throughout the periventricular white matter   and subcortical white matter as well as within the bilateral basal   ganglia, bilateral thalami, and bilateral cerebelli, may reflect a   combination of patient's known multiple sclerosis and/or chronic   microvascular ischemic changes, and comparison to prior outside imaging   would be helpful for further evaluation.  Rest of the chronic findings as above.     PRINCIPAL PROCEDURE  Procedure: CT angiogram head and neck vessels w contrast  Findings and Treatment: TECHNIQUE: CT perfusion and CTA of the head and neck were performed   following the intravenous administration of IV contrast. MIP   reconstructions were performed on a separate workstation and reviewed.   RAPID software was utilized for perfusion analysis.  FINDINGS:  CT PERFUSION:  TECHNICAL LIMITATIONS: Mild motion artifact.  CBF<30%/CORE INFARCTION: 11 mL  TMAX>6s/UNDERPERFUSED TISSUE: 8 mL  MISMATCH VOLUME/TISSUE AT RISK: -3 mL  MISMATCH RATIO: 0.7.  MISCELLANEOUS: Patchy areas of signal abnormality present in the anterior   aspect of the frontal lobes does not correspond to any vascular territory   and is likely related to registration artifact from increased extra-axial   spaces in those regions.  CTA NECK:  AORTIC ARCH AND VISUALIZED GREAT VESSELS: Within normal limits.  RIGHT:  COMMON CAROTID ARTERY: No significant stenosis to the carotid bifurcation.  INTERNAL CAROTID ARTERY: No significant stenosis based on NASCET criteria.  VERTEBRAL ARTERY: Normal in course and caliber to the intracranial   circulation.  LEFT:  COMMON CAROTID ARTERY: No significant stenosis to the carotid bifurcation.  INTERNAL CAROTID ARTERY: No significant stenosis based on NASCET criteria.  VERTEBRAL ARTERY: Normal in course and caliber to the intracranial   circulation.  VISUALIZED LUNGS: Clear.  MISCELLANEOUS: None.  CAROTID STENOSIS REFERENCE: Percent (%) stenosis is expressed in terms of   NASCET Criteria. (NASCET = 100x1-(N/D)). N=greatest narrowing. D=normal   distal diameter - MILD = <50% stenosis. - MODERATE = 50-69% stenosis. -   SEVERE = 70-89% stenosis. - HAIRLINE/CRITICAL = 90-99% stenosis. -   OCCLUDED = 100% stenosis.  CTAHEAD:  INTERNAL CAROTID ARTERIES: Bilateral petrous, precavernous, cavernous,   and supraclinoid regions are patent without significant stenosis.  [place]: No aneurysm identified. Tiny aneurysms can be beyond   the resolution of CTA technique.  ANTERIOR CEREBRAL ARTERIES: No significant stenosis or occlusion.  MIDDLE CEREBRAL ARTERIES: No significant stenosis or oc      SECONDARY PROCEDURE  Procedure: MRI  Findings and Treatment: No acute infarct or other acute abnormality. There are numerous foci of   T2/FLAIR hyperintense signal throughout the periventricular white matter   and subcortical white matter as well as within the bilateral basal   ganglia, bilateral thalami, and bilateral cerebelli, may reflect a   combination of patient's known multiple sclerosis and/or chronic   microvascular ischemic changes, and comparison to prior outside imaging   would be helpful for further evaluation.  CERVICAL SPINE  Alignment is maintained. There is mild decreased cervical lordosis.  Mild multilevel degenerative spinal changes. Mildly prominentposterior   disc bulging is seen at C3-C4 and C4-C5 with effacement of the thecal   sac. No spinal canal stenosis or neuroforaminal narrowing.  Small nerve root sleeve cysts are seen bilaterally at the C7-T1 level.  No bone marrow edema or suspicious marrow signal abnormality. No   paraspinous soft tissue abnormality or epidural fluid collection.  There are multiple T2 hyperintense foci within the cervical cord at at   the C3-C5 levels. No evidence of enhancement. No evidence for cord   compression.  THORACIC SPINE  Alignment is maintained. Normal thoracic kyphosis.  Mild chronic compression deformity seen involving the anterior aspect of   the T6 vertebral body.  Mild central posterior disc protrusion at T6-T7 with effacement of the   thecal sac. No spinal canal stenosis or neuroforaminal narrowing.  The thoracic cord is normal in size and morphology, without intrinsic   signal abnormality. The conus medullaris terminates at approximately the   L2 level on sagittal imaging. Partially visualized nerve roots of the   cauda equina are unremarkable.  There is no mass, fluid collection or edema in the paraspinous soft   tissues.  INCIDENTAL FINDINGS: Left renal 1.3 cm cyst.  IMPRESSION:  Multiple T2 hyperintense foci within the cervical cord, likely   representing demyelinating lesions related to patient's known multiple   sclerosis. No evidence of active disease.  Multilevel spinal d

## 2024-11-24 NOTE — PHYSICAL THERAPY INITIAL EVALUATION ADULT - IMPAIRMENTS FOUND, PT EVAL
aerobic capacity/endurance/gait, locomotion, and balance/muscle strength gait, locomotion, and balance/muscle strength

## 2024-11-24 NOTE — H&P ADULT - NSHPLABSRESULTS_GEN_ALL_CORE
LABS and ADDITIONAL STUDIES:                        14.2   4.81  )-----------( 247      ( 24 Nov 2024 06:11 )             43.5     140  |  105  |  13  ----------------------------<  119[H]     11-24  3.9   |  20[L]  |  0.59    Ca    9.5      24 Nov 2024 06:11  Phos  3.6     11-24  Mg     2.10     11-24    TPro  6.8  /  Alb  4.1  /  TBili  0.6  /  DBili  x   /  AST  23  /  ALT  30  /  AlkPhos  87  11-24    hs Troponin, T - 8 ng/L (11-24-24 @ 06:11)  hs Troponin, T - 6 ng/L (11-24-24 @ 00:06)    Pro-BNP: <36 (11-24-24 @ 00:06)    Hgb A1c: 5.2 (11-24-24 @ 06:11)    < from: Xray Chest 1 View- PORTABLE-Urgent (11.23.24 @ 23:14) >  ******PRELIMINARY REPORT******    FINDINGS:  The heart is normal in size.  The lungs are clear.  There is no pneumothorax or pleural effusion.  No acute bony abnormality.    IMPRESSION:  Clear lungs.  < end of copied text >    < from: CT Brain Stroke Protocol (11.24.24 @ 02:56) >  FINDINGS:  VENTRICLES AND SULCI: Atrophy out of proportion to patient's age.  INTRA-AXIAL: No mass effect, acute hemorrhage, or midline shift.  There are periventricular and subcortical white matter hypodensities, consistent with microvascular type changes.  EXTRA-AXIAL: No mass or fluid collection. Basal cisterns are normal in appearance.    VISUALIZED SINUSES:  Clear.  TYMPANOMASTOID CAVITIES:  Clear.  VISUALIZED ORBITS: Normal.  CALVARIUM: Intact.    MISCELLANEOUS: None.    IMPRESSION:  No acute intracranial hemorrhage, mass effect, or midline shift.  --- End of Report ---  < end of copied text >    < from: CT Brain Perfusion Maps Stroke (11.24.24 @ 02:57) >  IMPRESSION:  CT PERFUSION:  No evidence of acute core infarct or ischemic penumbra. MRI brain recommended if symptoms persist.    CTA NECK:  No evidence of significant stenosis or occlusion.    CTA HEAD:  No large vessel occlusion, significant stenosis or vascular abnormality identified.  --- End of Report ---  < end of copied text >    EKG - significant artifact but overall NSR at 72, QTc 440, no significant ST-T wave changes

## 2024-11-24 NOTE — DISCHARGE NOTE PROVIDER - PROVIDER TOKENS
PROVIDER:[TOKEN:[[id number]],FOLLOWUP:[1 week],ESTABLISHEDPATIENT:[T]] PROVIDER:[TOKEN:[[id number]],FOLLOWUP:[1 week],ESTABLISHEDPATIENT:[T]],PROVIDER:[TOKEN:[[id number]],FOLLOWUP:[1 week],ESTABLISHEDPATIENT:[T]]

## 2024-11-24 NOTE — DISCHARGE NOTE PROVIDER - NSDCCPCAREPLAN_GEN_ALL_CORE_FT
PRINCIPAL DISCHARGE DIAGNOSIS  Diagnosis: Chest pain  Assessment and Plan of Treatment: Chest Pain  Chest pain can be caused by many different conditions which may or may not be dangerous. Causes include heartburn, lung infections, heart attack, blood clot in lungs, skin infections, strain or damage to muscle, cartilage, or bones, etc. In addition to a history and physical examination, an electrocardiogram (ECG) or other lab tests may have been performed to determine the cause of your chest pain. Follow up with your primary care provider or with a cardiologist as instructed.   SEEK IMMEDIATE MEDICAL CARE IF YOU HAVE ANY OF THE FOLLOWING SYMPTOMS: worsening chest pain, coughing up blood, unexplained back/neck/jaw pain, severe abdominal pain, dizziness or lightheadedness, fainting, shortness of breath, sweaty or clammy skin, vomiting, or racing heart beat. These symptoms may represent a serious problem that is an emergency. Do not wait to see if the symptoms will go away. Get medical help right away. Call 911 and do not drive yourself to the hospital.         SECONDARY DISCHARGE DIAGNOSES  Diagnosis: Leg numbness  Assessment and Plan of Treatment:      PRINCIPAL DISCHARGE DIAGNOSIS  Diagnosis: Chest pain  Assessment and Plan of Treatment: Chest Pain  Chest pain can be caused by many different conditions which may or may not be dangerous. Causes include heartburn, lung infections, heart attack, blood clot in lungs, skin infections, strain or damage to muscle, cartilage, or bones, etc. In addition to a history and physical examination, an electrocardiogram (ECG) or other lab tests may have been performed to determine the cause of your chest pain. Follow up with your primary care provider or with a cardiologist as instructed.   SEEK IMMEDIATE MEDICAL CARE IF YOU HAVE ANY OF THE FOLLOWING SYMPTOMS: worsening chest pain, coughing up blood, unexplained back/neck/jaw pain, severe abdominal pain, dizziness or lightheadedness, fainting, shortness of breath, sweaty or clammy skin, vomiting, or racing heart beat. These symptoms may represent a serious problem that is an emergency. Do not wait to see if the symptoms will go away. Get medical help right away. Call 911 and do not drive yourself to the hospital.         SECONDARY DISCHARGE DIAGNOSES  Diagnosis: Leg numbness  Assessment and Plan of Treatment: You experienced leg numbess while you were in the hospital. A thorough workup was performed and was unremarkable. Please follow up with your neurologist in 1-2 wks.

## 2024-11-24 NOTE — PROGRESS NOTE ADULT - PROBLEM SELECTOR PLAN 1
- Midsternal chest pain, non-radiating, with associated palpitations, worsens with movement, no pleuritic component  - Here work up shows her to have negative troponin x2 and EKG with NSR  - Lipid profile abnormal (started on atorvastatin), A1C wnl, TSH mildly elevated  - Does report having a new cough for the past week but with minimal sputum and CXR with clear lungs    PLAN  - on tele, follow up TTE, with possible cardiology evaluation pending results of her TTE  - c/w atorvastatin for her elevated LDL/TChol  - f/u free T4 and total T3

## 2024-11-24 NOTE — H&P ADULT - PROBLEM SELECTOR PLAN 1
This document is complete and the patient is ready for discharge. - Midsternal chest pain, non-radiating, with associated palpitations, worsens with movement, no pleuritic component  - Here work up shows her to have negative troponin x2 and EKG with NSR  - Lipid profile abnormal (started on atorvastatin), A1C wnl, TSH mildly elevated  - Does report having a new cough for the past week but with minimal sputum and CXR with clear lungs    -> Unclear on cause of her chest pain, for now will monitor on telemetry, check TTE  -> Started on atorvastatin for her elevated LDL/TChol  -> Check free T4 and total T3 for her elevated TSH  -> Possible cardiology evaluation pending results of her TTE

## 2024-11-24 NOTE — DISCHARGE NOTE PROVIDER - CARE PROVIDER_API CALL
KATI SY  [address] [place] [address]  Phone: [phone]  Established Patient  Follow Up Time: 1 week   KATI SY  [address] [place] [address]  Phone: [phone]  Established Patient  Follow Up Time: 1 week    EILEEN AGUIRRE  [facility] [address] [place] [id number]  Phone: [phone]  Fax: ([phone]  Established Patient  Follow Up Time: 1 week

## 2024-11-24 NOTE — H&P ADULT - ASSESSMENT
This is a 64F with history of Multiple Sclerosis currently on Ocrevus (next dose due on 11/25/24) who presents to the hospital initially with complaints of new onset midsternal chest pain x1 day with course c/b new onset L leg weakness/numbness concerning for possible CVA vs MS flare.

## 2024-11-24 NOTE — PHYSICAL THERAPY INITIAL EVALUATION ADULT - GENERAL OBSERVATIONS, REHAB EVAL
Patient received semi-supine in bed, +telemetry, +external catheter to wall suction, NAD. PT agreeable to PT evaluation Patient received semi-supine in bed, +telemetry, +primafit, in NAD. PT agreeable to PT evaluation

## 2024-11-24 NOTE — STROKE CODE NOTE - IV ALTEPLASE ADMINISTRATION
Please note the updated address and phone number for the Neurology Clinic:     [address], [address] [place] [address]  [address]   [phone]        For Your Information     Â· If you are in need of a medication refill please use one of the following options:  1. Call your pharmacy if there are refills remaining. 2. Alana Wallace- [email or web address]  3. Call your providers office    PLEASE BE AWARE OF WHAT MEDICATIONS YOU ARE CURRENTLY TAKING, INCLUDING OVER-THE-COUNTER MEDICATIONS AND SUPPLEMENTS. REVIEW THE LIST INCLUDED WITH THIS PACKET AND BE SURE TO NOTIFY THE PROVIDER OF ANY CHANGES THAT NEED TO BE MADE. Â· If your provider ordered testing today, you will be notified of your test results within 3-5 business days unless specified otherwise. If you have not received your results within 5 business days please call your provider's office. Â· You may be receiving a survey via mail or e-mail following your visit. Please take the time to complete this, as your feedback is very important to us! We strive to make your experience exceptional and your comments help us with that goal.  We look forward to hearing from you    Â· For future visits, please arrive 15 minutes earlier than your scheduled appointment time to allow the nurse enough time to update your chart prior to seeing the provider. This ensures you receive the full amount of visit time with your provider. Thank you! No

## 2024-11-24 NOTE — DISCHARGE NOTE PROVIDER - HOSPITAL COURSE
Admission Date: 11/24/2024    Discharge Date:    Reason for Admission: chest pain    Discharge Diagnosis:    Hospital Course  64F with history of Multiple Sclerosis currently on Ocrevus and gabapentin with full time HHA, ambulates with cane and walker presented to the hospital initially with complaints of new onset midsternal chest pain x1 day. ACS workup in the ED was negative. No prior cardiac history. While in the ED, developed new onset left leg numbness/weakness and stroke code was called. CTH and CTA was negative with no acute intracranial pathology. Pt was offered tenecteplase and patient refused. Placed on aspirin and statin. Additionally, had left calf swelling and DVT study performed was negative. Pt received an MRI head, cervical, and thoracic which showed XXXX.    On day of discharge, patient is clinically stable with no new exam findings or acute symptoms compared to prior. The patient was seen by the attending physician on the date of discharge and deemed stable and acceptable for discharge. The patient's chronic medical conditions were treated accordingly per the patient's home medication regimen. The patient's medication reconciliation (with changes made to chronic medications), follow up appointments, discharge orders, instructions, and significant lab and diagnostic studies are as noted.     Discharge follow up action items:     1. Follow up with NA  2. Follow up labs, path, & imaging: NA  3. Medication changes:  maybe add aspirin and statin based on MRI and neuro   4. On hold medications:  NA    Patient's ordered code status: Full    Patient Disposition: Home with home PT Admission Date: 11/24/2024    Discharge Date: 11/26/24    Reason for Admission: chest pain    Discharge Diagnosis:    Hospital Course  64F with history of Multiple Sclerosis currently on Ocrevus and gabapentin with full time HHA, ambulates with cane and walker presented to the hospital initially with complaints of new onset midsternal chest pain x1 day. ACS workup in the ED was negative. No prior cardiac history. While in the ED, developed new onset left leg numbness/weakness and stroke code was called. CTH and CTA was negative with no acute intracranial pathology. Pt was offered tenecteplase and patient refused. Placed on aspirin and statin. Additionally, had left calf swelling and DVT study performed was negative. Pt received an MRI head, cervical, and thoracic which showed XXXX.    For chest pain, TTE was performed, EF was 72%, no RWMA, no further w/u needed.    On day of discharge, patient is clinically stable with no new exam findings or acute symptoms compared to prior. The patient was seen by the attending physician on the date of discharge and deemed stable and acceptable for discharge. The patient's chronic medical conditions were treated accordingly per the patient's home medication regimen. The patient's medication reconciliation (with changes made to chronic medications), follow up appointments, discharge orders, instructions, and significant lab and diagnostic studies are as noted.     Discharge follow up action items:   1. Follow up with neurology    Patient's ordered code status: Full    Patient Disposition: Home with home PT Admission Date: 11/24/2024    Discharge Date: 11/26/2024    Reason for Admission: chest pain    Discharge Diagnosis: chest pain, leg weakness     Hospital Course  64F with history of Multiple Sclerosis currently on Ocrevus and gabapentin with full time HHA, ambulates with cane and walker presented to the hospital initially with complaints of new onset midsternal chest pain x1 day. ACS workup in the ED was negative. No prior cardiac history. While in the ED, developed new onset left leg numbness/weakness and stroke code was called. CTH and CTA was negative with no acute intracranial pathology. Pt was offered tenecteplase and patient refused. Placed on aspirin and statin. Additionally, had left calf swelling and DVT study performed was negative. Pt received an MRI head, cervical, and thoracic which were non concerning, demyelinating lesions from known MS, no active lesions.    For chest pain, TTE was performed, EF was 72%, no RWMA, no further w/u needed.    On day of discharge, patient is clinically stable with no new exam findings or acute symptoms compared to prior. The patient was seen by the attending physician on the date of discharge and deemed stable and acceptable for discharge. The patient's chronic medical conditions were treated accordingly per the patient's home medication regimen. The patient's medication reconciliation (with changes made to chronic medications), follow up appointments, discharge orders, instructions, and significant lab and diagnostic studies are as noted.     Discharge follow up action items:   1. Follow up with neurology    Patient's ordered code status: Full    Patient Disposition: Home with home PT Admission Date: 11/24/2024    Discharge Date: 11/26/2024    Reason for Admission: chest pain    Discharge Diagnosis: chest pain, leg weakness     Hospital Course  64F with history of Multiple Sclerosis currently on Ocrevus and gabapentin with full time HHA, ambulates with cane and walker presented to the hospital initially with complaints of new onset midsternal chest pain x1 day. ACS workup in the ED was negative. No prior cardiac history. While in the ED, developed new onset left leg numbness/weakness and stroke code was called. CTH and CTA was negative with no acute intracranial pathology. Pt was offered tenecteplase and patient refused. Placed on aspirin and statin. Additionally, had left calf swelling and DVT study performed was negative. Pt received an MRI head, cervical, and thoracic which were non concerning, demyelinating lesions from known MS, no active lesions. Pt's MS medication (was due to be given during hospitalization) was held after discussion with outpatient neurologist.    For chest pain, TTE was performed, EF was 72%, no RWMA, no further w/u needed.    On day of discharge, patient is clinically stable with no new exam findings or acute symptoms compared to prior. The patient was seen by the attending physician on the date of discharge and deemed stable and acceptable for discharge. The patient's chronic medical conditions were treated accordingly per the patient's home medication regimen. The patient's medication reconciliation (with changes made to chronic medications), follow up appointments, discharge orders, instructions, and significant lab and diagnostic studies are as noted.     Discharge follow up action items:   1. Follow up with neurology    Patient's ordered code status: Full    Patient Disposition: Home with home PT Admission Date: 11/24/2024    Discharge Date: 11/27/2024    Reason for Admission: chest pain    Discharge Diagnosis: chest pain, leg weakness     Hospital Course  64F with history of Multiple Sclerosis currently on Ocrevus and gabapentin with full time HHA, ambulates with cane and walker presented to the hospital initially with complaints of new onset midsternal chest pain x1 day. ACS workup in the ED was negative. No prior cardiac history. While in the ED, developed new onset left leg numbness/weakness and stroke code was called. CTH and CTA was negative with no acute intracranial pathology. Pt was offered tenecteplase and patient refused. Placed on aspirin and statin. Additionally, had left calf swelling and DVT study performed was negative. Pt received an MRI head, cervical, and thoracic which were non concerning, demyelinating lesions from known MS, no active lesions. Pt's MS medication (was due to be given during hospitalization) was held after discussion with outpatient neurologist.    For chest pain, TTE was performed, EF was 72%, no RWMA, no further w/u needed.    On day of discharge, patient is clinically stable with no new exam findings or acute symptoms compared to prior. The patient was seen by the attending physician on the date of discharge and deemed stable and acceptable for discharge. The patient's chronic medical conditions were treated accordingly per the patient's home medication regimen. The patient's medication reconciliation (with changes made to chronic medications), follow up appointments, discharge orders, instructions, and significant lab and diagnostic studies are as noted.     Discharge follow up action items:   1. Follow up with neurology    Patient's ordered code status: Full    Patient Disposition: Home with home PT

## 2024-11-24 NOTE — OCCUPATIONAL THERAPY INITIAL EVALUATION ADULT - RANGE OF MOTION EXAMINATION, UPPER EXTREMITY
Pt reports PMH of shoulder pain at times. Active ROM was WFL/bilateral UE Active ROM was WFL  (within functional limits)

## 2024-11-24 NOTE — SWALLOW BEDSIDE ASSESSMENT ADULT - SWALLOW EVAL: DIAGNOSIS
1. Moderate oral dysphagia for soft and bite sized characterized by adequate oral aperture and bolus collection with incomplete mastication and breakdown of bolus with delayed A-P transport. 2. Functional oral phase for thin liquids, mildly thick liquids, pureed and minced and moist characterized by adequate oral aperture and bolus collection with functional A-P transport. 3. Functional pharyngeal phase for thin liquids, mildly thick liquids, pureed, and minced and moist characterized by initiation of the pharyngeal swallow with hyolaryngeal elevation and excursion upon digital palpation without evidence of airway invasion. 4. Moderate pharyngeal dysphagia for soft and bite sized characterized by initiation of the pharyngeal swallow with hyolaryngeal elevation and excursion upon digital palpation. There was an immediate cough response, in which the patient reported "it feels like it went down the wrong way". Patient also reported "stuck" sensation.

## 2024-11-25 LAB — TROPONIN T, HIGH SENSITIVITY RESULT: 8 NG/L — SIGNIFICANT CHANGE UP

## 2024-11-25 PROCEDURE — 99232 SBSQ HOSP IP/OBS MODERATE 35: CPT | Mod: GC

## 2024-11-25 PROCEDURE — 72156 MRI NECK SPINE W/O & W/DYE: CPT | Mod: 26

## 2024-11-25 PROCEDURE — 72157 MRI CHEST SPINE W/O & W/DYE: CPT | Mod: 26

## 2024-11-25 PROCEDURE — 70553 MRI BRAIN STEM W/O & W/DYE: CPT | Mod: 26

## 2024-11-25 PROCEDURE — 74230 X-RAY XM SWLNG FUNCJ C+: CPT | Mod: 26

## 2024-11-25 RX ORDER — LIDOCAINE 40 MG/G
1 CREAM TOPICAL ONCE
Refills: 0 | Status: COMPLETED | OUTPATIENT
Start: 2024-11-25 | End: 2024-11-25

## 2024-11-25 RX ADMIN — Medication 81 MILLIGRAM(S): at 12:05

## 2024-11-25 RX ADMIN — GABAPENTIN 300 MILLIGRAM(S): 300 CAPSULE ORAL at 21:49

## 2024-11-25 RX ADMIN — LIDOCAINE 1 PATCH: 40 CREAM TOPICAL at 01:55

## 2024-11-25 RX ADMIN — ENOXAPARIN SODIUM 40 MILLIGRAM(S): 30 INJECTION SUBCUTANEOUS at 13:07

## 2024-11-25 RX ADMIN — GABAPENTIN 300 MILLIGRAM(S): 300 CAPSULE ORAL at 05:21

## 2024-11-25 RX ADMIN — Medication 80 MILLIGRAM(S): at 21:49

## 2024-11-25 RX ADMIN — GABAPENTIN 300 MILLIGRAM(S): 300 CAPSULE ORAL at 13:09

## 2024-11-25 NOTE — PROGRESS NOTE ADULT - PROBLEM SELECTOR PLAN 3
- Cough x1 week with minimal sputum, no SOB, no hemoptysis, no fevers/chills  - Is on ocrevus which can lower the immune system but currently no septic vitals, no leukocytosis, afebrile here  - CXR with clear lungs    PLAN  -> Check COVID/Flu/RSV swab  -> Monitor symptomatically for now - Cough x1 week with minimal sputum, no SOB, no hemoptysis, no fevers/chills  - Is on ocrevus which can lower the immune system but currently no septic vitals, no leukocytosis, afebrile here  - CXR with clear lungs    PLAN  -> Check COVID/Flu/RSV swab--> negative   -> Monitor symptomatically for now

## 2024-11-25 NOTE — PROGRESS NOTE ADULT - SUBJECTIVE AND OBJECTIVE BOX
PROGRESS NOTE:   Authored by Dr. Pao Graham MD (PGY-1).     Patient is a 64y old  Female who presents with a chief complaint of Chest pain, new onset L leg numbness and weakness (24 Nov 2024 16:03)      SUBJECTIVE / OVERNIGHT EVENTS:  No acute events overnight.     ADDITIONAL REVIEW OF SYSTEMS:  Patient denies fevers, chills, chest pain, shortness of breath, nausea, abdominal pain, diarrhea, constipation, dysuria, leg swelling, headache, light headedness.    MEDICATIONS  (STANDING):  aspirin enteric coated 81 milliGRAM(s) Oral daily  atorvastatin 80 milliGRAM(s) Oral at bedtime  enoxaparin Injectable 40 milliGRAM(s) SubCutaneous every 24 hours  gabapentin 300 milliGRAM(s) Oral three times a day  influenza   Vaccine 0.5 milliLiter(s) IntraMuscular once    MEDICATIONS  (PRN):      CAPILLARY BLOOD GLUCOSE        I&O's Summary      PHYSICAL EXAM:  Vital Signs Last 24 Hrs  T(C): 36.4 (25 Nov 2024 05:20), Max: 36.8 (24 Nov 2024 13:35)  T(F): 97.6 (25 Nov 2024 05:20), Max: 98.2 (24 Nov 2024 13:35)  HR: 73 (25 Nov 2024 05:20) (65 - 77)  BP: 131/77 (25 Nov 2024 05:20) (117/64 - 141/78)  BP(mean): --  RR: 17 (25 Nov 2024 05:20) (16 - 18)  SpO2: 100% (25 Nov 2024 05:20) (99% - 100%)    Parameters below as of 25 Nov 2024 05:20  Patient On (Oxygen Delivery Method): room air        CONSTITUTIONAL: NAD, well-developed  RESPIRATORY: Normal respiratory effort; lungs are clear to auscultation bilaterally  CARDIOVASCULAR: Regular rate and rhythm, normal S1 and S2, no murmur/rub/gallop; No lower extremity edema; Peripheral pulses are 2+ bilaterally  ABDOMEN: Nontender to palpation, normoactive bowel sounds, no rebound/guarding; No hepatosplenomegaly  MSK: no clubbing or cyanosis of digits; no joint swelling or tenderness to palpation  PSYCH: A+O to person, place, and time; affect appropriate    LABS:                        14.2   4.81  )-----------( 247      ( 24 Nov 2024 06:11 )             43.5     11-24    140  |  105  |  13  ----------------------------<  119[H]  3.9   |  20[L]  |  0.59    Ca    9.5      24 Nov 2024 06:11  Phos  3.6     11-24  Mg     2.10     11-24    TPro  6.8  /  Alb  4.1  /  TBili  0.6  /  DBili  x   /  AST  23  /  ALT  30  /  AlkPhos  87  11-24          Urinalysis Basic - ( 24 Nov 2024 06:11 )    Color: x / Appearance: x / SG: x / pH: x  Gluc: 119 mg/dL / Ketone: x  / Bili: x / Urobili: x   Blood: x / Protein: x / Nitrite: x   Leuk Esterase: x / RBC: x / WBC x   Sq Epi: x / Non Sq Epi: x / Bacteria: x          Tele Reviewed:    RADIOLOGY & ADDITIONAL TESTS:  Results Reviewed:   Imaging Personally Reviewed:  Electrocardiogram Personally Reviewed:     PROGRESS NOTE:   Authored by Dr. Pao Graham MD (PGY-1).     Patient is a 64y old  Female who presents with a chief complaint of Chest pain, new onset L leg numbness and weakness (24 Nov 2024 16:03)      SUBJECTIVE / OVERNIGHT EVENTS:  Overnight had 10/10 neck pain associated left ear and eye pain. ACS workup performed was negative. Received tylenol and lidocaine patch. No blurry vision or slurred speech. Seen at bedside this AM, neck pain present but intermittent. Leg numbness/weakness same.    ADDITIONAL REVIEW OF SYSTEMS:  Patient denies fevers, chills, chest pain, shortness of breath, nausea, abdominal pain, diarrhea, constipation, dysuria, leg swelling, headache, light headedness.    MEDICATIONS  (STANDING):  aspirin enteric coated 81 milliGRAM(s) Oral daily  atorvastatin 80 milliGRAM(s) Oral at bedtime  enoxaparin Injectable 40 milliGRAM(s) SubCutaneous every 24 hours  gabapentin 300 milliGRAM(s) Oral three times a day  influenza   Vaccine 0.5 milliLiter(s) IntraMuscular once    MEDICATIONS  (PRN):      CAPILLARY BLOOD GLUCOSE        I&O's Summary      PHYSICAL EXAM:  Vital Signs Last 24 Hrs  T(C): 36.4 (25 Nov 2024 05:20), Max: 36.8 (24 Nov 2024 13:35)  T(F): 97.6 (25 Nov 2024 05:20), Max: 98.2 (24 Nov 2024 13:35)  HR: 73 (25 Nov 2024 05:20) (65 - 77)  BP: 131/77 (25 Nov 2024 05:20) (117/64 - 141/78)  BP(mean): --  RR: 17 (25 Nov 2024 05:20) (16 - 18)  SpO2: 100% (25 Nov 2024 05:20) (99% - 100%)    Parameters below as of 25 Nov 2024 05:20  Patient On (Oxygen Delivery Method): room air        CONSTITUTIONAL: NAD, well-developed  RESPIRATORY: Normal respiratory effort; lungs are clear to auscultation bilaterally  CARDIOVASCULAR: Regular rate and rhythm, normal S1 and S2, no murmur/rub/gallop; No lower extremity edema; Peripheral pulses are 2+ bilaterally  ABDOMEN: Nontender to palpation, normoactive bowel sounds, no rebound/guarding; No hepatosplenomegaly  MSK: no clubbing or cyanosis of digits; no joint swelling or tenderness to palpation  PSYCH: A+O to person, place, and time; affect appropriate  NEURO: cervical paraspinal tenderness and left trapezius tenderness, +lidocaine. PERRLA, mild left eye injection    LABS:                        14.2   4.81  )-----------( 247      ( 24 Nov 2024 06:11 )             43.5     11-24    140  |  105  |  13  ----------------------------<  119[H]  3.9   |  20[L]  |  0.59    Ca    9.5      24 Nov 2024 06:11  Phos  3.6     11-24  Mg     2.10     11-24    TPro  6.8  /  Alb  4.1  /  TBili  0.6  /  DBili  x   /  AST  23  /  ALT  30  /  AlkPhos  87  11-24          Urinalysis Basic - ( 24 Nov 2024 06:11 )    Color: x / Appearance: x / SG: x / pH: x  Gluc: 119 mg/dL / Ketone: x  / Bili: x / Urobili: x   Blood: x / Protein: x / Nitrite: x   Leuk Esterase: x / RBC: x / WBC x   Sq Epi: x / Non Sq Epi: x / Bacteria: x          Tele Reviewed:    RADIOLOGY & ADDITIONAL TESTS:  Results Reviewed:   Imaging Personally Reviewed:  Electrocardiogram Personally Reviewed:

## 2024-11-25 NOTE — SWALLOW VFSS/MBS ASSESSMENT ADULT - ORAL PHASE
repetitive tongue motion for piecemeal transfer/deglutition x2-3/Incomplete tongue to palate contact/Uncontrolled bolus / spillover in hypopharynx/Laryngeal penetration before swallow - silent repetitive tongue motion for piecemeal transfer/deglutition x2/Incomplete tongue to palate contact/Uncontrolled bolus / spillover in hypopharynx Uncontrolled bolus / spillover in hypopharynx repetitive tongue motion for piecemeal transfer/deglutition x2-3

## 2024-11-25 NOTE — SWALLOW VFSS/MBS ASSESSMENT ADULT - COMMENTS
Internal Medicine 11/25, "This is a 64F with history of Multiple Sclerosis currently on Ocrevus (next dose due on 11/25/24) who presents to the hospital initially with complaints of new onset midsternal chest pain x1 day with course c/b new onset L leg weakness/numbness concerning for possible CVA vs MS flare."     CT Brain 11/24: IMPRESSION: No acute intracranial hemorrhage, mass effect, or midline shift.    CXR 11/24: IMPRESSION: Clear lungs.    Of Note: Patient known to this service, seen for a bedside swallow evaluation on 11/24 with recommendations for "Minced and moist solids with thin liquids as tolerated; Cinesophagram to objectively assess swallow mechanism given complaint of globus sensation and history of dysphagia" (see note for details)    Patient received in Radiology Suite for a Cinesophagram. Patient was transferred to a specialized seating unit with lateral projection. Patient reports history of choking requiring the Heimlich Maneuver in the past and was brought to "another hospital." Patient denies having completed an objective swallowing test in the past.

## 2024-11-25 NOTE — SWALLOW VFSS/MBS ASSESSMENT ADULT - DIAGNOSTIC IMPRESSIONS
1. Mild oral dysphagia for puree, regular solids, moderately thick liquids, mildly thick liquids and thin liquids characterized by adequate acceptance and containment, adequate mastication of regular solids, repetitive tongue motion for piecemeal transfer/deglutition x2-3 across consistencies with premature spillage to the hypopharynx (varying the vallecula/ lateral surface of the epiglottis/ pyriforms) for liquids (thin liquids/ mildly thick liquids> moderately thick liquids). There was adequate oral clearance. 2. Mild pharyngeal dysphagia for puree, regular solids, moderately thick liquids, mildly thick liquids and thin liquids characterized by delayed initiation of the pharyngeal swallow (bolus head at the vallecula for liquids and varying the vallecula/ pyriforms for regular solids), adequate base of tongue retraction, adequate hyolaryngeal excursion, adequate epiglottic deflection, adequate laryngeal vestibular closure and adequate pharyngeal contractility. There was adequate pharyngeal clearance post swallow. There was One Episode of Laryngeal Penetration BEFORE the swallow (along the lateral surface of the epiglottis) exacerbated by oral stage deficits (premature spillage) which was retrieved during the swallow with airway protection maintained for Large Cup Sip of Mildly Thick Liquids. There was No Aspiration before, during or after the swallow for puree, regular solids, moderately thick liquids, mildly thick liquids and thin liquids.

## 2024-11-25 NOTE — SWALLOW VFSS/MBS ASSESSMENT ADULT - RECOMMENDED CONSISTENCY
1. Regular Solids with Thin Liquids  2. Aspiration precautions  3. Feeding and Swallowing Guidelines: Small bites; Small sips; reswallow x1 per bite/ sip; slow rate of intake; upright with PO and at least 30 minutes following  4. Oral Hygiene  5. Reflux precautions

## 2024-11-25 NOTE — SWALLOW VFSS/MBS ASSESSMENT ADULT - ADDITIONAL RECOMMENDATIONS
1. This service to follow for diet tolerance as schedule permits. 2. Medical team advised to reconsult this service if patient is with a change in medical status or change in tolerance of recommended PO. 3. Patient may benefit from swallowing therapy pending discharge plans (i.e., Rehab center vs. Homecare vs. Outpatient at [facility] [phone])

## 2024-11-25 NOTE — SWALLOW VFSS/MBS ASSESSMENT ADULT - DEMONSTRATES NEED FOR REFERRAL TO ANOTHER SERVICE
Consider GI consult given patient reports dysphagia with adequate pharyngeal clearance to rule out an esophageal dysphagia component

## 2024-11-25 NOTE — PROGRESS NOTE ADULT - PROBLEM SELECTOR PLAN 4
- Reports having L ankle swelling at baseline, now with new L calf swelling  - On exam noted to have b/l non-pitting edema but worse on L and +TTP on l, cold compared to R  - No erythema/warmth to suggest cellulitis  - D-Dimer in ED negative    PLAN  -> Check US duplex venous for DVT r/o  -> Encourage leg elevation - Reports having L ankle swelling at baseline, now with new L calf swelling  - On exam noted to have b/l non-pitting edema but worse on L and +TTP on l, cold compared to R  - No erythema/warmth to suggest cellulitis  - D-Dimer in ED negative  - US duplex negative     PLAN  -> Encourage leg elevation

## 2024-11-25 NOTE — PROGRESS NOTE ADULT - PROBLEM SELECTOR PLAN 2
- New onset L leg numbness and weakness while in ED, here on examination has decreased sensation to light touch of the L arm and L leg, also with weakness of the L leg at hip/knee/foot when compared to the R side  - CTH and CTA H/N without acute abnormalities  - Neuro evaluated patient -> concern for CVA vs MS flare, patient was offered tenecteplase but she/family refused it after discussion with neurology  -passed dysphagia screen in ED-->minced and moist per SS  A1c: 5.2%    PLAN  - CTM on telemetry  -> neurology following, recs appreciated  -> Permissive HTN to 220/120 for 48 hrs followed by gradual normotension  -> Neuro checks and vitals q4h  -> f/u TTE with bubble study, MR Head/Cervical Spine w/wo ordered  -  c/w low dose aspirin and atorvastatin 80mg  -> f/u PT/OT eval  -> Obtain collateral from patient's outpatient neurologist if able

## 2024-11-25 NOTE — CHART NOTE - NSCHARTNOTEFT_GEN_A_CORE
Spoke with pt's outpatient neurologist, Dr. Remberto Ramsay (office number: [phone]) and he said that it was okay that pt's infusion is rescheduled to a later date.

## 2024-11-26 LAB
CRP SERPL-MCNC: <3 MG/L — SIGNIFICANT CHANGE UP
ERYTHROCYTE [SEDIMENTATION RATE] IN BLOOD: 2 MM/HR — LOW (ref 4–25)

## 2024-11-26 PROCEDURE — 99233 SBSQ HOSP IP/OBS HIGH 50: CPT | Mod: GC

## 2024-11-26 PROCEDURE — 93306 TTE W/DOPPLER COMPLETE: CPT | Mod: 26

## 2024-11-26 PROCEDURE — 99231 SBSQ HOSP IP/OBS SF/LOW 25: CPT | Mod: GC

## 2024-11-26 RX ORDER — ACETAMINOPHEN 500MG 500 MG/1
650 TABLET, COATED ORAL ONCE
Refills: 0 | Status: COMPLETED | OUTPATIENT
Start: 2024-11-26 | End: 2024-11-26

## 2024-11-26 RX ORDER — SENNOSIDES 8.6 MG
2 TABLET ORAL ONCE
Refills: 0 | Status: COMPLETED | OUTPATIENT
Start: 2024-11-26 | End: 2024-11-26

## 2024-11-26 RX ORDER — ACETAMINOPHEN 500MG 500 MG/1
2 TABLET, COATED ORAL
Qty: 42 | Refills: 0
Start: 2024-11-26 | End: 2024-12-02

## 2024-11-26 RX ORDER — POLYETHYLENE GLYCOL 3350 17 G/17G
17 POWDER, FOR SOLUTION ORAL
Qty: 1 | Refills: 0
Start: 2024-11-26

## 2024-11-26 RX ORDER — MAGNESIUM CITRATE
296 SOLUTION, ORAL ORAL ONCE
Refills: 0 | Status: COMPLETED | OUTPATIENT
Start: 2024-11-26 | End: 2024-11-26

## 2024-11-26 RX ORDER — POLYETHYLENE GLYCOL 3350 17 G/17G
17 POWDER, FOR SOLUTION ORAL ONCE
Refills: 0 | Status: COMPLETED | OUTPATIENT
Start: 2024-11-26 | End: 2024-11-26

## 2024-11-26 RX ORDER — CHLORHEXIDINE GLUCONATE 1.2 MG/ML
1 RINSE ORAL
Refills: 0 | Status: DISCONTINUED | OUTPATIENT
Start: 2024-11-26 | End: 2024-11-27

## 2024-11-26 RX ORDER — ACETAMINOPHEN 500MG 500 MG/1
1000 TABLET, COATED ORAL ONCE
Refills: 0 | Status: COMPLETED | OUTPATIENT
Start: 2024-11-26 | End: 2024-11-27

## 2024-11-26 RX ORDER — ACETAMINOPHEN 500MG 500 MG/1
1000 TABLET, COATED ORAL ONCE
Refills: 0 | Status: DISCONTINUED | OUTPATIENT
Start: 2024-11-26 | End: 2024-11-26

## 2024-11-26 RX ADMIN — ACETAMINOPHEN 500MG 650 MILLIGRAM(S): 500 TABLET, COATED ORAL at 18:37

## 2024-11-26 RX ADMIN — ACETAMINOPHEN 500MG 650 MILLIGRAM(S): 500 TABLET, COATED ORAL at 22:10

## 2024-11-26 RX ADMIN — GABAPENTIN 300 MILLIGRAM(S): 300 CAPSULE ORAL at 13:41

## 2024-11-26 RX ADMIN — POLYETHYLENE GLYCOL 3350 17 GRAM(S): 17 POWDER, FOR SOLUTION ORAL at 13:30

## 2024-11-26 RX ADMIN — Medication 80 MILLIGRAM(S): at 21:48

## 2024-11-26 RX ADMIN — ACETAMINOPHEN 500MG 650 MILLIGRAM(S): 500 TABLET, COATED ORAL at 18:58

## 2024-11-26 RX ADMIN — GABAPENTIN 300 MILLIGRAM(S): 300 CAPSULE ORAL at 21:48

## 2024-11-26 RX ADMIN — ACETAMINOPHEN 500MG 650 MILLIGRAM(S): 500 TABLET, COATED ORAL at 21:10

## 2024-11-26 RX ADMIN — GABAPENTIN 300 MILLIGRAM(S): 300 CAPSULE ORAL at 06:06

## 2024-11-26 RX ADMIN — CHLORHEXIDINE GLUCONATE 1 APPLICATION(S): 1.2 RINSE ORAL at 12:09

## 2024-11-26 RX ADMIN — Medication 81 MILLIGRAM(S): at 12:08

## 2024-11-26 RX ADMIN — ENOXAPARIN SODIUM 40 MILLIGRAM(S): 30 INJECTION SUBCUTANEOUS at 12:08

## 2024-11-26 NOTE — DISCHARGE NOTE NURSING/CASE MANAGEMENT/SOCIAL WORK - FINANCIAL ASSISTANCE
[facility] provides services at a reduced cost to those who are determined to be eligible through [facility]’s financial assistance program. Information regarding [facility]’s financial assistance program can be found by going to [email or web address] or by calling [phone].

## 2024-11-26 NOTE — PROGRESS NOTE ADULT - SUBJECTIVE AND OBJECTIVE BOX
PROGRESS NOTE:   Authored by Dr. Pao Graham MD (PGY-1).     Patient is a 64y old  Female who presents with a chief complaint of Chest pain, new onset L leg numbness and weakness (25 Nov 2024 08:26)      SUBJECTIVE / OVERNIGHT EVENTS:  No acute events overnight.     ADDITIONAL REVIEW OF SYSTEMS:  Patient denies fevers, chills, chest pain, shortness of breath, nausea, abdominal pain, diarrhea, constipation, dysuria, leg swelling, headache, light headedness.    MEDICATIONS  (STANDING):  aspirin enteric coated 81 milliGRAM(s) Oral daily  atorvastatin 80 milliGRAM(s) Oral at bedtime  enoxaparin Injectable 40 milliGRAM(s) SubCutaneous every 24 hours  gabapentin 300 milliGRAM(s) Oral three times a day  influenza   Vaccine 0.5 milliLiter(s) IntraMuscular once    MEDICATIONS  (PRN):      CAPILLARY BLOOD GLUCOSE        I&O's Summary      PHYSICAL EXAM:  Vital Signs Last 24 Hrs  T(C): 36.6 (26 Nov 2024 01:45), Max: 36.7 (25 Nov 2024 21:45)  T(F): 97.9 (26 Nov 2024 01:45), Max: 98 (25 Nov 2024 21:45)  HR: 75 (26 Nov 2024 01:45) (75 - 83)  BP: 120/59 (26 Nov 2024 01:45) (120/59 - 132/77)  BP(mean): --  RR: 18 (26 Nov 2024 01:45) (18 - 18)  SpO2: 98% (26 Nov 2024 01:45) (98% - 100%)    Parameters below as of 26 Nov 2024 01:45  Patient On (Oxygen Delivery Method): room air        CONSTITUTIONAL: NAD, well-developed  RESPIRATORY: Normal respiratory effort; lungs are clear to auscultation bilaterally  CARDIOVASCULAR: Regular rate and rhythm, normal S1 and S2, no murmur/rub/gallop; No lower extremity edema; Peripheral pulses are 2+ bilaterally  ABDOMEN: Nontender to palpation, normoactive bowel sounds, no rebound/guarding; No hepatosplenomegaly  MSK: no clubbing or cyanosis of digits; no joint swelling or tenderness to palpation  PSYCH: A+O to person, place, and time; affect appropriate    LABS:                      Tele Reviewed:    RADIOLOGY & ADDITIONAL TESTS:  Results Reviewed:   Imaging Personally Reviewed:  Electrocardiogram Personally Reviewed:     PROGRESS NOTE:   Authored by Dr. Pao Graham MD (PGY-1).     Patient is a 64y old  Female who presents with a chief complaint of Chest pain, new onset L leg numbness and weakness (25 Nov 2024 08:26)      SUBJECTIVE / OVERNIGHT EVENTS:  No acute events overnight. Still have stable leg numbness/weakness. Intermittent neck pain with associated eye pain elicited by movement.    ADDITIONAL REVIEW OF SYSTEMS:  Patient denies fevers, chills, chest pain, shortness of breath, nausea, abdominal pain, diarrhea, constipation, dysuria, leg swelling, headache, light headedness.    MEDICATIONS  (STANDING):  aspirin enteric coated 81 milliGRAM(s) Oral daily  atorvastatin 80 milliGRAM(s) Oral at bedtime  enoxaparin Injectable 40 milliGRAM(s) SubCutaneous every 24 hours  gabapentin 300 milliGRAM(s) Oral three times a day  influenza   Vaccine 0.5 milliLiter(s) IntraMuscular once    MEDICATIONS  (PRN):      CAPILLARY BLOOD GLUCOSE        I&O's Summary      PHYSICAL EXAM:  Vital Signs Last 24 Hrs  T(C): 36.6 (26 Nov 2024 01:45), Max: 36.7 (25 Nov 2024 21:45)  T(F): 97.9 (26 Nov 2024 01:45), Max: 98 (25 Nov 2024 21:45)  HR: 75 (26 Nov 2024 01:45) (75 - 83)  BP: 120/59 (26 Nov 2024 01:45) (120/59 - 132/77)  BP(mean): --  RR: 18 (26 Nov 2024 01:45) (18 - 18)  SpO2: 98% (26 Nov 2024 01:45) (98% - 100%)    Parameters below as of 26 Nov 2024 01:45  Patient On (Oxygen Delivery Method): room air        CONSTITUTIONAL: NAD, well-developed  RESPIRATORY: Normal respiratory effort; lungs are clear to auscultation bilaterally  CARDIOVASCULAR: Regular rate and rhythm, normal S1 and S2, no murmur/rub/gallop; No lower extremity edema; Peripheral pulses are 2+ bilaterally  ABDOMEN: Nontender to palpation, normoactive bowel sounds, no rebound/guarding; No hepatosplenomegaly  MSK: no clubbing or cyanosis of digits; no joint swelling or tenderness to palpation  PSYCH: A+O to person, place, and time; affect appropriate  NEURO: left leg strength 4/5 reduced compared to right, PERRLA, no focal deficits    LABS:                      Tele Reviewed:    RADIOLOGY & ADDITIONAL TESTS:  Results Reviewed:   Imaging Personally Reviewed:  Electrocardiogram Personally Reviewed:

## 2024-11-26 NOTE — PROGRESS NOTE ADULT - PROBLEM SELECTOR PLAN 3
- Cough x1 week with minimal sputum, no SOB, no hemoptysis, no fevers/chills  - Is on ocrevus which can lower the immune system but currently no septic vitals, no leukocytosis, afebrile here  - CXR with clear lungs    PLAN  -> Check COVID/Flu/RSV swab--> negative   -> Monitor symptomatically for now

## 2024-11-26 NOTE — CHART NOTE - NSCHARTNOTEFT_GEN_A_CORE
As patient was preparing to discharge, complained of 10/10 headache. Stated that this was similar in quality to her chronic headaches, but more severe. Also endorsed dizziness and mild nausea. Multiple family members at bedside stated that they would like to cancel the discharge due to these symptoms. Initially gave patient 650mg tylenol (2nd dose; first given by primary team) and checked on patient later. However, patient endorsing worsening dizziness and headache. In the setting of worsening dizziness and family concern, discharge cancel. Will signout and defer to day team.

## 2024-11-26 NOTE — DISCHARGE NOTE NURSING/CASE MANAGEMENT/SOCIAL WORK - NSDCFUADDAPPT_GEN_ALL_CORE_FT
APPTS ARE READY TO BE MADE: [x] YES    Best Family or Patient Contact (if needed):    Additional Information about above appointments (if needed):    1: PCP Dr. Muller  2: Neuro Dr. Ramsay  3:     Other comments or requests:

## 2024-11-26 NOTE — PROGRESS NOTE ADULT - SUBJECTIVE AND OBJECTIVE BOX
Neurology Progress Note    SUBJECTIVE/OBJECTIVE/INTERVAL EVENTS: Patient seen and examined at bedside w/ neuro attending and team.     INTERVAL HISTORY: No acute events. MRs obtained - no enhancing lesions.    REVIEW OF SYSTEMS: Few questions of a 10-system ROS was performed and is negative except for those items noted above and/or in the HPI.    VITALS & EXAMINATION:  Vital Signs Last 24 Hrs  T(C): 36.2 (26 Nov 2024 13:09), Max: 36.7 (25 Nov 2024 21:45)  T(F): 97.2 (26 Nov 2024 13:09), Max: 98 (25 Nov 2024 21:45)  HR: 77 (26 Nov 2024 13:09) (66 - 83)  BP: 115/60 (26 Nov 2024 13:09) (115/60 - 133/65)  BP(mean): --  RR: 17 (26 Nov 2024 13:09) (17 - 18)  SpO2: 98% (26 Nov 2024 13:09) (98% - 100%)    Parameters below as of 26 Nov 2024 13:09  Patient On (Oxygen Delivery Method): room air    Physical Examination:   General - NAD, pleasant, cooperative   Extremities- Joint deformities noted in bilateral hands and R elbow    Neurologic Exam:  Mental status - Awake, Alert, Oriented to person, place, and time. Speech fluent, repetition and naming intact. Follows simple commands.  Cranial nerves:   CN II: Visual fields are full to confrontation. Pupils are 4 mm and briskly reactive to light.   CN III, IV, VI: EOMI, no nystagmus, no ptosis  CN V: Facial sensation is intact to pinprick in all 3 divisions bilaterally.  CN VII: Face is symmetric with normal eye closure and smile.  CN VII: Hearing is normal to rubbing fingers  CN IX, X: Palate elevates symmetrically. Phonation is normal.  CN XI: Head turning and shoulder shrug are intact  CN XII: Tongue is midline with normal movements and no atrophy.  Motor - Normal bulk and tone throughout. No pronator drift of out-stretched arms.  Strength testing maintains b/l UE antigravity for 10s without drift, RLE antigravity 5s without drift, LLE no drift but limited antigravity range  Sensation - decreased sensation to light touch left lower>upper extremities, distal>proximal.  Coordination - not assessed  Gait and station - not assessed      LABORATORY:  CBC   Chem       LFTs   Coagulopathy   Lipid Panel 11-24 Chol 213[H] LDL -- HDL 56 Trig 133  A1c   Cardiac enzymes     U/A   CSF  Immunological      Other    STUDIES & IMAGING: (EEG, CT, MR, U/S, TTE/ELMER): Neurology Progress Note    SUBJECTIVE/OBJECTIVE/INTERVAL EVENTS: Patient seen and examined at bedside w/ neuro attending and team.     INTERVAL HISTORY: No acute events. MRs obtained - no enhancing lesions.    REVIEW OF SYSTEMS: Few questions of a 10-system ROS was performed and is negative except for those items noted above and/or in the HPI.    VITALS & EXAMINATION:  Vital Signs Last 24 Hrs  T(C): 36.2 (26 Nov 2024 13:09), Max: 36.7 (25 Nov 2024 21:45)  T(F): 97.2 (26 Nov 2024 13:09), Max: 98 (25 Nov 2024 21:45)  HR: 77 (26 Nov 2024 13:09) (66 - 83)  BP: 115/60 (26 Nov 2024 13:09) (115/60 - 133/65)  BP(mean): --  RR: 17 (26 Nov 2024 13:09) (17 - 18)  SpO2: 98% (26 Nov 2024 13:09) (98% - 100%)    Parameters below as of 26 Nov 2024 13:09  Patient On (Oxygen Delivery Method): room air    Physical Examination:   General - NAD, pleasant, cooperative   Extremities- Joint deformities noted in bilateral hands and R elbow    Neurologic Exam:  Mental status - Awake, Alert, Oriented to person, place, and time. Speech fluent, repetition and naming intact. Follows simple commands.  Cranial nerves:   CN II: Visual fields are full to confrontation. Pupils are 4 mm and briskly reactive to light.   CN III, IV, VI: EOMI, no nystagmus, no ptosis  CN V: Facial sensation is intact to pinprick in all 3 divisions bilaterally.  CN VII: Face is symmetric with normal eye closure and smile.  CN VII: Hearing is normal to rubbing fingers  CN IX, X: Palate elevates symmetrically. Phonation is normal.  CN XI: Head turning and shoulder shrug are intact  CN XII: Tongue is midline with normal movements and no atrophy.  Motor - Normal bulk and tone throughout. No pronator drift of out-stretched arms.  Strength testing maintains b/l UE antigravity for 10s without drift, RLE antigravity 5s without drift, LLE no drift but limited antigravity range  Sensation - decreased sensation to light touch left lower>upper extremities, distal>proximal.  Coordination - not assessed  Gait and station - not assessed      LABORATORY:  CBC   Chem       LFTs   Coagulopathy   Lipid Panel 11-24 Chol 213[H] LDL -- HDL 56 Trig 133  A1c   Cardiac enzymes     U/A   CSF  Immunological      Other    STUDIES & IMAGING: (EEG, CT, MR, U/S, TTE/ELMER):    CT PERFUSION:  No evidence of acute core infarct or ischemic penumbra. MRI brain   recommended if symptoms persist.    CTA NECK:  No evidence of significant stenosis or occlusion.    CTA HEAD:  No large vessel occlusion, significant stenosis or vascular abnormality   identified.    MR brain w/w/o IV contrast 11/25  IMPRESSION:  No acute infarct or other acute abnormality. There are numerous foci of   T2/FLAIR hyperintense signal throughout the periventricular white matter   and subcortical white matter as well as within the bilateral basal   ganglia, bilateral thalami, and bilateral cerebelli, may reflect a   combination of patient's known multiple sclerosis and/or chronic   microvascular ischemic changes, and comparison to prior outside imaging   would be helpful for further evaluation.    Rest of the chronic findings as above.    MR C/T spine w/w/o IV contrast 11/25/24  IMPRESSION:  Multiple T2 hyperintense foci within the cervical cord, likely   representing demyelinating lesions related to patient's known multiple   sclerosis. No evidence of active disease.    Multilevel spinal degenerative changes as described above.

## 2024-11-26 NOTE — PROGRESS NOTE ADULT - PROBLEM SELECTOR PLAN 6
DVT ppx - Lovenox  Diet - minced and moist  Code: Full  Dispo: pending hospital course DVT ppx - Lovenox  Diet - regular   Code: Full  Dispo: Home PT

## 2024-11-26 NOTE — PROGRESS NOTE ADULT - PROBLEM SELECTOR PLAN 4
- Reports having L ankle swelling at baseline, now with new L calf swelling  - On exam noted to have b/l non-pitting edema but worse on L and +TTP on l, cold compared to R  - No erythema/warmth to suggest cellulitis  - D-Dimer in ED negative  - US duplex negative     PLAN  -> Encourage leg elevation

## 2024-11-26 NOTE — PROGRESS NOTE ADULT - PROBLEM SELECTOR PLAN 1
- Midsternal chest pain, non-radiating, with associated palpitations, worsens with movement, no pleuritic component  - Here work up shows her to have negative troponin x2 and EKG with NSR  - Lipid profile abnormal (started on atorvastatin), A1C wnl, TSH mildly elevated  - Does report having a new cough for the past week but with minimal sputum and CXR with clear lungs    PLAN  - on tele, follow up TTE, with possible cardiology evaluation pending results of her TTE  - c/w atorvastatin for her elevated LDL/TChol  - f/u free T4 and total T3 - Midsternal chest pain, non-radiating, with associated palpitations, worsens with movement, no pleuritic component  - Here work up shows her to have negative troponin x2 and EKG with NSR  - Lipid profile abnormal (started on atorvastatin), A1C wnl, TSH mildly elevated  - Does report having a new cough for the past week but with minimal sputum and CXR with clear lungs  - TTE bubble study negative, EF 72%    PLAN  - c/w atorvastatin for her elevated LDL/TChol

## 2024-11-26 NOTE — PROGRESS NOTE ADULT - PROBLEM SELECTOR PLAN 2
- New onset L leg numbness and weakness while in ED, here on examination has decreased sensation to light touch of the L arm and L leg, also with weakness of the L leg at hip/knee/foot when compared to the R side  - CTH and CTA H/N without acute abnormalities  - Neuro evaluated patient -> concern for CVA vs MS flare, patient was offered tenecteplase but she/family refused it after discussion with neurology  -passed dysphagia screen in ED-->minced and moist per SS  A1c: 5.2%    PLAN  - CTM on telemetry  -> neurology following, recs appreciated  -> Permissive HTN to 220/120 for 48 hrs followed by gradual normotension  -> Neuro checks and vitals q4h  -> f/u TTE with bubble study, MR Head/Cervical Spine w/wo ordered  -  c/w low dose aspirin and atorvastatin 80mg  -> f/u PT/OT eval  -> Obtain collateral from patient's outpatient neurologist if able - New onset L leg numbness and weakness while in ED, here on examination has decreased sensation to light touch of the L arm and L leg, also with weakness of the L leg at hip/knee/foot when compared to the R side  - CTH and CTA H/N without acute abnormalities  - Neuro evaluated patient -> concern for CVA vs MS flare, patient was offered tenecteplase but she/family refused it after discussion with neurology  advanced diet to regular  A1c: 5.2%  Home PT    PLAN  -  CTM on telemetry  -> neurology following, recs appreciated  -> Permissive HTN to 220/120 for 48 hrs followed by gradual normotension  -  c/w low dose aspirin and atorvastatin 80mg  -> Obtain collateral from patient's outpatient neurologist if able-->ok to reschedule infusion

## 2024-11-26 NOTE — DISCHARGE NOTE NURSING/CASE MANAGEMENT/SOCIAL WORK - PATIENT PORTAL LINK FT
You can access the FollowMyHealth Patient Portal offered by [facility] by registering at the following website: [email or web address]. By joining [facility]’s FollowMyHealth portal, you will also be able to view your health information using other applications (apps) compatible with our system.

## 2024-11-26 NOTE — PROGRESS NOTE ADULT - ASSESSMENT
Assessment: 64F PMHx multiple sclerosis on ocrelizumab, migraines initially presented for substernal chest pain. While in ED, noted sudden onset left side numbness for which code stroke was called. Initial VS in ED: /73. Exam: Decreased sensation to light touch, pinprick, temperature in left lower>upper extremities, distal>proximal. Mild left lower extremity drift, with decreased strength in knee flexion/extension and dorsiflexion/plantarflexion. Hyporeflexia on left side compared to right.   CT head and CTA/CTP are unremarkable. MR brain/C/T spine without active enhancing lesions.    Impression: Left arm and leg sensory>motor deficit, MR brain/C/T spine without active lesions. Possible peripheral etiology.     Plan:    [x] MRI brain and cervical spine w/ and without contrast:  [] Basic labs: CBC, CMP, coagulation panel and troponin,  HgbA1C (5.2), lipid panel (), UA  [] Aspirin 81 mg daily for now (if MRI negative for stroke would discontinue) -> please discontinue  [] atorvastatin if indicated by ASCVD score  [] Lovenox SQ for DVT prophylaxis   [x] b/l LE duplex 11/24/24 no DVTs   [] normotension  [] requires outpatient follow up with her neurologist for EMG/NCS for c/f possible peripheral etiology.  [] encourage supplementation of Vitamin D and calcium    Seen with attending on rounds. Case and plan not final until Attending attestation.     Assessment: 64F PMHx multiple sclerosis on ocrelizumab, migraines initially presented for substernal chest pain. While in ED, noted sudden onset left side numbness for which code stroke was called. Initial VS in ED: /73. Exam: Decreased sensation to light touch, pinprick, temperature in left lower>upper extremities, distal>proximal. Mild left lower extremity drift, with decreased strength in knee flexion/extension and dorsiflexion/plantarflexion. Hyporeflexia on left side compared to right.   CT head and CTA/CTP are unremarkable. MR brain/C/T spine without active enhancing lesions.    Impression: Left arm and leg sensory>motor deficit, MR brain/C/T spine without active lesions. Possible peripheral etiology.     Plan:    [x] MRI brain and cervical spine w/ and without contrast:  [] Basic labs: CBC, CMP, coagulation panel and troponin,  HgbA1C (5.2), lipid panel (), UA  [] please discontinue aspirin as MRI negative for stroke  [] atorvastatin if indicated by ASCVD score  [] Lovenox SQ for DVT prophylaxis   [x] b/l LE duplex 11/24/24 no DVTs   [] normotension  [] requires outpatient follow up with her neurologist for EMG/NCS for c/f possible peripheral etiology.  [] encourage supplementation of Vitamin D and calcium    Seen with attending on rounds. Case and plan not final until Attending attestation.

## 2024-11-27 VITALS
OXYGEN SATURATION: 100 % | RESPIRATION RATE: 18 BRPM | DIASTOLIC BLOOD PRESSURE: 77 MMHG | TEMPERATURE: 98 F | HEART RATE: 64 BPM | SYSTOLIC BLOOD PRESSURE: 131 MMHG

## 2024-11-27 PROCEDURE — 99239 HOSP IP/OBS DSCHRG MGMT >30: CPT

## 2024-11-27 RX ORDER — MECLIZINE HCL 12.5 MG
12.5 TABLET ORAL ONCE
Refills: 0 | Status: COMPLETED | OUTPATIENT
Start: 2024-11-27 | End: 2024-11-27

## 2024-11-27 RX ORDER — CAFFEINE 200 MG
200 TABLET ORAL ONCE
Refills: 0 | Status: COMPLETED | OUTPATIENT
Start: 2024-11-27 | End: 2024-11-27

## 2024-11-27 RX ORDER — MAGNESIUM CITRATE
296 SOLUTION, ORAL ORAL ONCE
Refills: 0 | Status: COMPLETED | OUTPATIENT
Start: 2024-11-27 | End: 2024-11-27

## 2024-11-27 RX ORDER — KETOROLAC TROMETHAMINE 30 MG/ML
15 INJECTION INTRAMUSCULAR; INTRAVENOUS ONCE
Refills: 0 | Status: DISCONTINUED | OUTPATIENT
Start: 2024-11-27 | End: 2024-11-27

## 2024-11-27 RX ORDER — 0.9 % SODIUM CHLORIDE 0.9 %
500 INTRAVENOUS SOLUTION INTRAVENOUS ONCE
Refills: 0 | Status: DISCONTINUED | OUTPATIENT
Start: 2024-11-27 | End: 2024-11-27

## 2024-11-27 RX ORDER — 0.9 % SODIUM CHLORIDE 0.9 %
500 INTRAVENOUS SOLUTION INTRAVENOUS ONCE
Refills: 0 | Status: COMPLETED | OUTPATIENT
Start: 2024-11-27 | End: 2024-11-27

## 2024-11-27 RX ADMIN — CHLORHEXIDINE GLUCONATE 1 APPLICATION(S): 1.2 RINSE ORAL at 05:04

## 2024-11-27 RX ADMIN — GABAPENTIN 300 MILLIGRAM(S): 300 CAPSULE ORAL at 05:05

## 2024-11-27 RX ADMIN — GABAPENTIN 300 MILLIGRAM(S): 300 CAPSULE ORAL at 13:44

## 2024-11-27 RX ADMIN — ACETAMINOPHEN 500MG 1000 MILLIGRAM(S): 500 TABLET, COATED ORAL at 07:20

## 2024-11-27 RX ADMIN — Medication 500 MILLILITER(S): at 08:55

## 2024-11-27 RX ADMIN — ENOXAPARIN SODIUM 40 MILLIGRAM(S): 30 INJECTION SUBCUTANEOUS at 13:45

## 2024-11-27 RX ADMIN — Medication 200 MILLIGRAM(S): at 10:15

## 2024-11-27 RX ADMIN — KETOROLAC TROMETHAMINE 15 MILLIGRAM(S): 30 INJECTION INTRAMUSCULAR; INTRAVENOUS at 10:15

## 2024-11-27 RX ADMIN — Medication 296 MILLILITER(S): at 10:14

## 2024-11-27 RX ADMIN — Medication 12.5 MILLIGRAM(S): at 10:15

## 2024-11-27 RX ADMIN — ACETAMINOPHEN 500MG 400 MILLIGRAM(S): 500 TABLET, COATED ORAL at 06:20

## 2024-11-27 NOTE — PROGRESS NOTE ADULT - PROBLEM SELECTOR PLAN 1
- Midsternal chest pain, non-radiating, with associated palpitations, worsens with movement, no pleuritic component  - Here work up shows her to have negative troponin x2 and EKG with NSR  - Lipid profile abnormal (started on atorvastatin), A1C wnl, TSH mildly elevated  - Does report having a new cough for the past week but with minimal sputum and CXR with clear lungs  - TTE bubble study negative, EF 72%    PLAN  - dc aspirin and atorvastatin

## 2024-11-27 NOTE — PROGRESS NOTE ADULT - ATTENDING COMMENTS
Patient seen and examined at bedside.  During my evaluation, patient's wife was present and I appreciate that.    Ms. Bennett is a 64 year old woman with PMHx multiple sclerosis on ocrelizumab, migraines initially presented for substernal chest pain. Neurology consulted due to the worsening of left lower extremity weakness.   Neuroimaging MRI Brain, C and T spine did not showed any active lesions but consistent chronic multiple lesions throughout brain and spinal cord.       Outpatient MRI L spine and EMG/NCS for rule out peripheral etiology.   Vit D supplement   Continue medical management, neuro- check and fall precaution.  GI and DVT prophylaxis.  I discussed the diagnosis and treatment plan with the patient.  All questions and concerns were addressed. The patient demonstrated good understanding of the treatment plan.  My cumulative time taking care of this patient is 60 minutes  If you have any further questions, please do not hesitate to contact our consult service.  Thank you for allowing us to participate in this patient care.
64F with history of Multiple Sclerosis currently on Ocrevus who presents to the hospital initially with complaints of new onset midsternal chest pain x1 day with course c/b new onset L leg weakness/numbness concerning for possible CVA vs MS flare.     R/o CVA vs MS flare:  Very mild deficits on Neuro exam. MRI negative for CVA, negative for active MS.  S/S eval--Regular diet.    Headache: pt reports headache at time of discharge 11/26.  Neuro exam unchanged, imaging of head completed this admission without concerning findings.  WNL, symptoms described consistent with migraine (pt with history of migraines)    Chest pain: Appears to be MSK in nature.    L Leg swelling:  Negative for DVT.     Dispo: Home with Home PT today.  Discussed plan at length with patient, follow-up with o/p Neuro and PCP.  DC time 42 minutes
64F with history of Multiple Sclerosis currently on Ocrevus (next dose due on 11/25/24) who presents to the hospital initially with complaints of new onset midsternal chest pain x1 day with course c/b new onset L leg weakness/numbness concerning for possible CVA vs MS flare. Stroke code activated on admission, CTA head imaging without significant lesions. Patient refused tenecteplase administration due to c/f risk of bleeding. Patient now pending MRI head for further workup. And TTE for stroke/cardiac workup.     Of note, for MS pt is on ocrevus infusions q6 months. Planned for next infusion tomorrow (11/25). f/u with neurology if this should be delayed while hospitalized     Rest as above
64F with history of Multiple Sclerosis currently on Ocrevus (next dose due on 11/25/24) who presents to the hospital initially with complaints of new onset midsternal chest pain x1 day with course c/b new onset L leg weakness/numbness concerning for possible CVA vs MS flare.     R/o CVA vs MS flare:  Very mild deficits on Neuro exam. MRI pending.  S/S eval--Regular diet.    Chest pain: Appears to be MSK in nature.    L Leg swelling:  Negative for DVT. Monitor    Dispo: eventually home, PT recs Home PT
64F with history of Multiple Sclerosis currently on Ocrevus (next dose due on 11/25/24) who presents to the hospital initially with complaints of new onset midsternal chest pain x1 day with course c/b new onset L leg weakness/numbness concerning for possible CVA vs MS flare.     R/o CVA vs MS flare:  Very mild deficits on Neuro exam. MRI negative for CVA, negative for active MS.  S/S eval--Regular diet.    Chest pain: Appears to be MSK in nature.    L Leg swelling:  Negative for DVT. Monitor    Dispo: Home with Home PT today.  Discussed plan at length with patient, follow-up with o/p Neuro and PCP.  DC time 40 minutes

## 2024-11-27 NOTE — PROGRESS NOTE ADULT - PROBLEM SELECTOR PLAN 2
- New onset L leg numbness and weakness while in ED, here on examination has decreased sensation to light touch of the L arm and L leg, also with weakness of the L leg at hip/knee/foot when compared to the R side  - CTH and CTA H/N without acute abnormalities  - Neuro evaluated patient -> concern for CVA vs MS flare, patient was offered tenecteplase but she/family refused it after discussion with neurology  advanced diet to regular  A1c: 5.2%  Home PT    PLAN  -  CTM on telemetry  -> neurology following, recs appreciated  -> Permissive HTN to 220/120 for 48 hrs followed by gradual normotension  -> Obtain collateral from patient's outpatient neurologist if able-->ok to reschedule infusion

## 2024-11-27 NOTE — PROGRESS NOTE ADULT - PROBLEM SELECTOR PLAN 5
- On ocrevus infusions q6 months, was supposed to get her next infusion tomorrow (11/25)  - Also on gabapentin 300mg TID for neuropathic pain    PLAN  -> Advised patient to contact her neurologist and likely have her ocrevus infusion rescheduled  -> c/w gabapentin

## 2024-11-27 NOTE — PROGRESS NOTE ADULT - REASON FOR ADMISSION
Chest pain, new onset L leg numbness and weakness

## 2024-11-27 NOTE — PROGRESS NOTE ADULT - SUBJECTIVE AND OBJECTIVE BOX
PROGRESS NOTE:   Authored by Dr. Pao Graham MD (PGY-1).     Patient is a 64y old  Female who presents with a chief complaint of Chest pain, new onset L leg numbness and weakness (26 Nov 2024 14:17)      SUBJECTIVE / OVERNIGHT EVENTS:  Pt became dizzy and got a headache right prior to transport and refused discharge. Seen at bedside this morning, dizziness improved and headache still present. Reports this headache similar to prior migraines shes had before. Received IV Tylenol overnight. Leg weakness stable     ADDITIONAL REVIEW OF SYSTEMS:  Patient denies fevers, chills, chest pain, shortness of breath, nausea, abdominal pain, diarrhea, constipation, dysuria, leg swelling,    MEDICATIONS  (STANDING):  chlorhexidine 2% Cloths 1 Application(s) Topical <User Schedule>  enoxaparin Injectable 40 milliGRAM(s) SubCutaneous every 24 hours  gabapentin 300 milliGRAM(s) Oral three times a day  influenza   Vaccine 0.5 milliLiter(s) IntraMuscular once  ketorolac   Injectable 15 milliGRAM(s) IV Push once  lactated ringers Bolus 500 milliLiter(s) IV Bolus once  magnesium citrate Oral Solution 296 milliLiter(s) Oral once  meclizine 12.5 milliGRAM(s) Oral once    MEDICATIONS  (PRN):      CAPILLARY BLOOD GLUCOSE        I&O's Summary      PHYSICAL EXAM:  Vital Signs Last 24 Hrs  T(C): 36.4 (27 Nov 2024 05:01), Max: 36.7 (26 Nov 2024 21:10)  T(F): 97.6 (27 Nov 2024 05:01), Max: 98.1 (26 Nov 2024 21:10)  HR: 67 (27 Nov 2024 05:01) (67 - 87)  BP: 145/67 (27 Nov 2024 05:01) (115/60 - 145/67)  BP(mean): --  RR: 16 (27 Nov 2024 05:01) (16 - 18)  SpO2: 100% (27 Nov 2024 05:01) (98% - 100%)    Parameters below as of 27 Nov 2024 05:01  Patient On (Oxygen Delivery Method): room air        CONSTITUTIONAL: NAD, well-developed  RESPIRATORY: Normal respiratory effort; lungs are clear to auscultation bilaterally  CARDIOVASCULAR: Regular rate and rhythm, normal S1 and S2, no murmur/rub/gallop; No lower extremity edema; Peripheral pulses are 2+ bilaterally  ABDOMEN: Nontender to palpation, normoactive bowel sounds, no rebound/guarding; No hepatosplenomegaly  MSK: no clubbing or cyanosis of digits; no joint swelling or tenderness to palpation  PSYCH: A+O to person, place, and time; affect appropriate  NEURO: left leg strength 4/5 reduced compared to right, PERRLA, no focal neurodeficits    LABS:                      Tele Reviewed:    RADIOLOGY & ADDITIONAL TESTS:  Results Reviewed:   Imaging Personally Reviewed:  Electrocardiogram Personally Reviewed:

## 2025-02-25 NOTE — ED ADULT NURSE NOTE - CAS EDN DISCHARGE INTERVENTIONS
Department of Orthopedic Surgery  Attending Consult Note        Reason for Consult:  left hip fracture   Requesting Physician:  ED     CHIEF COMPLAINT:  left leg/hip pain     History Obtained From:  patient    HISTORY OF PRESENT ILLNESS:                The patient is a 89 y.o. male with PMH of left RUTH ANN over 20 years ago, community ambulator who still lives at home, who suffered a fall onto his left hip in the bathroom yesterday.  He came to the ED due to hip pain and difficulty ambulating.  XR of the left hip showed a periprosthetic greater trochanter fracture on the left side with stable implant.  He also had shoulder pain, xray showed changes of chronic rotator cuff tear arthropathy without acute fracture/injury.  He was admitted to the hospital and orthopedics is consulted.    Past Medical History:        Diagnosis Date    Abnormal brain MRI 03/26/2020    Episode of syncope 03/25/2020    GI bleed     FOBT negative 3/29/20    Hypertension        Past Surgical History:        Procedure Laterality Date    BACK SURGERY      CARPAL TUNNEL RELEASE Bilateral     COLONOSCOPY N/A 4/26/2020    COLONOSCOPY DIAGNOSTIC performed by Juma Forte MD at [facility] OR    EYE SURGERY      HERNIA REPAIR      IR EMBOLIZATION HEMORRHAGE  8/9/2020    IR EMBOLIZATION HEMORRHAGE 8/9/2020 Shani Mahmood MD [facility]    JOINT REPLACEMENT Bilateral     hips    UPPER GASTROINTESTINAL ENDOSCOPY N/A 4/26/2020    EGD ESOPHAGOGASTRODUODENOSCOPY performed by Juma Forte MD at [facility] OR       Current Medications:   Current Facility-Administered Medications: sodium chloride flush 0.9 % injection 5-40 mL, 5-40 mL, IntraVENous, 2 times per day  sodium chloride flush 0.9 % injection 5-40 mL, 5-40 mL, IntraVENous, PRN  0.9 % sodium chloride infusion, , IntraVENous, PRN  ondansetron (ZOFRAN-ODT) disintegrating tablet 4 mg, 4 mg, Oral, Q8H PRN **OR** ondansetron (ZOFRAN) injection 4 mg, 4 mg, IntraVENous, Q6H PRN  polyethylene glycol (GLYCOLAX)  IV discontinued, cath removed intact

## 2025-03-31 NOTE — OCCUPATIONAL THERAPY INITIAL EVALUATION ADULT - STANDING BALANCE: STATIC
Pt notified of results and verbalized understanding.    Electronically signed by Mary Faria MA on 3/31/2025 at 8:46 AM     fair balance

## 2025-04-28 ENCOUNTER — EMERGENCY (EMERGENCY)
Facility: HOSPITAL | Age: 65
LOS: 1 days | End: 2025-04-28
Attending: EMERGENCY MEDICINE | Admitting: EMERGENCY MEDICINE
Payer: SELF-PAY

## 2025-04-28 VITALS
HEART RATE: 70 BPM | DIASTOLIC BLOOD PRESSURE: 72 MMHG | RESPIRATION RATE: 18 BRPM | TEMPERATURE: 98 F | OXYGEN SATURATION: 100 % | SYSTOLIC BLOOD PRESSURE: 104 MMHG

## 2025-04-28 DIAGNOSIS — Z98.891 HISTORY OF UTERINE SCAR FROM PREVIOUS SURGERY: Chronic | ICD-10-CM

## 2025-04-28 LAB
ADD ON TEST-SPECIMEN IN LAB: SIGNIFICANT CHANGE UP
ALBUMIN SERPL ELPH-MCNC: 4.4 G/DL — SIGNIFICANT CHANGE UP (ref 3.3–5)
ALP SERPL-CCNC: 92 U/L — SIGNIFICANT CHANGE UP (ref 40–120)
ALT FLD-CCNC: 50 U/L — HIGH (ref 4–33)
ANION GAP SERPL CALC-SCNC: 10 MMOL/L — SIGNIFICANT CHANGE UP (ref 7–14)
APTT BLD: 33.9 SEC — SIGNIFICANT CHANGE UP (ref 26.1–36.8)
AST SERPL-CCNC: 29 U/L — SIGNIFICANT CHANGE UP (ref 4–32)
BASOPHILS # BLD AUTO: 0.04 K/UL — SIGNIFICANT CHANGE UP (ref 0–0.2)
BASOPHILS NFR BLD AUTO: 1 % — SIGNIFICANT CHANGE UP (ref 0–2)
BILIRUB SERPL-MCNC: 0.8 MG/DL — SIGNIFICANT CHANGE UP (ref 0.2–1.2)
BUN SERPL-MCNC: 15 MG/DL — SIGNIFICANT CHANGE UP (ref 7–23)
CALCIUM SERPL-MCNC: 9.4 MG/DL — SIGNIFICANT CHANGE UP (ref 8.4–10.5)
CHLORIDE SERPL-SCNC: 105 MMOL/L — SIGNIFICANT CHANGE UP (ref 98–107)
CK MB BLD-MCNC: 1.6 % — SIGNIFICANT CHANGE UP (ref 0–2.5)
CK MB CFR SERPL CALC: 1 NG/ML — SIGNIFICANT CHANGE UP
CK SERPL-CCNC: 63 U/L — SIGNIFICANT CHANGE UP (ref 25–170)
CO2 SERPL-SCNC: 24 MMOL/L — SIGNIFICANT CHANGE UP (ref 22–31)
CREAT SERPL-MCNC: 0.62 MG/DL — SIGNIFICANT CHANGE UP (ref 0.5–1.3)
EGFR: 99 ML/MIN/1.73M2 — SIGNIFICANT CHANGE UP
EGFR: 99 ML/MIN/1.73M2 — SIGNIFICANT CHANGE UP
EOSINOPHIL # BLD AUTO: 0.08 K/UL — SIGNIFICANT CHANGE UP (ref 0–0.5)
EOSINOPHIL NFR BLD AUTO: 1.9 % — SIGNIFICANT CHANGE UP (ref 0–6)
GAS PNL BLDV: SIGNIFICANT CHANGE UP
GLUCOSE SERPL-MCNC: 104 MG/DL — HIGH (ref 70–99)
HCT VFR BLD CALC: 47.2 % — HIGH (ref 34.5–45)
HGB BLD-MCNC: 15.3 G/DL — SIGNIFICANT CHANGE UP (ref 11.5–15.5)
IANC: 2.21 K/UL — SIGNIFICANT CHANGE UP (ref 1.8–7.4)
IMM GRANULOCYTES NFR BLD AUTO: 2.6 % — HIGH (ref 0–0.9)
INR BLD: 1 RATIO — SIGNIFICANT CHANGE UP (ref 0.85–1.16)
LYMPHOCYTES # BLD AUTO: 1.23 K/UL — SIGNIFICANT CHANGE UP (ref 1–3.3)
LYMPHOCYTES # BLD AUTO: 29.5 % — SIGNIFICANT CHANGE UP (ref 13–44)
MAGNESIUM SERPL-MCNC: 2.2 MG/DL — SIGNIFICANT CHANGE UP (ref 1.6–2.6)
MCHC RBC-ENTMCNC: 28.3 PG — SIGNIFICANT CHANGE UP (ref 27–34)
MCHC RBC-ENTMCNC: 32.4 G/DL — SIGNIFICANT CHANGE UP (ref 32–36)
MCV RBC AUTO: 87.4 FL — SIGNIFICANT CHANGE UP (ref 80–100)
MONOCYTES # BLD AUTO: 0.5 K/UL — SIGNIFICANT CHANGE UP (ref 0–0.9)
MONOCYTES NFR BLD AUTO: 12 % — SIGNIFICANT CHANGE UP (ref 2–14)
NEUTROPHILS # BLD AUTO: 2.21 K/UL — SIGNIFICANT CHANGE UP (ref 1.8–7.4)
NEUTROPHILS NFR BLD AUTO: 53 % — SIGNIFICANT CHANGE UP (ref 43–77)
NRBC # BLD AUTO: 0 K/UL — SIGNIFICANT CHANGE UP (ref 0–0)
NRBC # FLD: 0 K/UL — SIGNIFICANT CHANGE UP (ref 0–0)
NRBC BLD AUTO-RTO: 0 /100 WBCS — SIGNIFICANT CHANGE UP (ref 0–0)
NT-PROBNP SERPL-SCNC: <36 PG/ML — SIGNIFICANT CHANGE UP
PLATELET # BLD AUTO: 242 K/UL — SIGNIFICANT CHANGE UP (ref 150–400)
POTASSIUM SERPL-MCNC: 4.1 MMOL/L — SIGNIFICANT CHANGE UP (ref 3.5–5.3)
POTASSIUM SERPL-SCNC: 4.1 MMOL/L — SIGNIFICANT CHANGE UP (ref 3.5–5.3)
PROT SERPL-MCNC: 7.2 G/DL — SIGNIFICANT CHANGE UP (ref 6–8.3)
PROTHROM AB SERPL-ACNC: 11.6 SEC — SIGNIFICANT CHANGE UP (ref 9.9–13.4)
RBC # BLD: 5.4 M/UL — HIGH (ref 3.8–5.2)
RBC # FLD: 13.1 % — SIGNIFICANT CHANGE UP (ref 10.3–14.5)
SODIUM SERPL-SCNC: 139 MMOL/L — SIGNIFICANT CHANGE UP (ref 135–145)
TROPONIN T, HIGH SENSITIVITY RESULT: 6 NG/L — SIGNIFICANT CHANGE UP
TROPONIN T, HIGH SENSITIVITY RESULT: <6 NG/L — SIGNIFICANT CHANGE UP
WBC # BLD: 4.17 K/UL — SIGNIFICANT CHANGE UP (ref 3.8–10.5)
WBC # FLD AUTO: 4.17 K/UL — SIGNIFICANT CHANGE UP (ref 3.8–10.5)

## 2025-04-28 PROCEDURE — 99223 1ST HOSP IP/OBS HIGH 75: CPT

## 2025-04-28 PROCEDURE — 71045 X-RAY EXAM CHEST 1 VIEW: CPT | Mod: 26

## 2025-04-28 PROCEDURE — 93971 EXTREMITY STUDY: CPT | Mod: 26,LT

## 2025-04-28 PROCEDURE — 93010 ELECTROCARDIOGRAM REPORT: CPT | Mod: 76

## 2025-04-28 RX ORDER — KETOROLAC TROMETHAMINE 30 MG/ML
15 INJECTION, SOLUTION INTRAMUSCULAR; INTRAVENOUS ONCE
Refills: 0 | Status: DISCONTINUED | OUTPATIENT
Start: 2025-04-28 | End: 2025-04-28

## 2025-04-28 RX ORDER — GABAPENTIN 400 MG/1
300 CAPSULE ORAL THREE TIMES A DAY
Refills: 0 | Status: ACTIVE | OUTPATIENT
Start: 2025-04-28 | End: 2026-03-27

## 2025-04-28 RX ORDER — MAGNESIUM, ALUMINUM HYDROXIDE 200-200 MG
30 TABLET,CHEWABLE ORAL ONCE
Refills: 0 | Status: COMPLETED | OUTPATIENT
Start: 2025-04-28 | End: 2025-04-28

## 2025-04-28 RX ORDER — ACETAMINOPHEN 500 MG/5ML
1000 LIQUID (ML) ORAL ONCE
Refills: 0 | Status: COMPLETED | OUTPATIENT
Start: 2025-04-28 | End: 2025-04-28

## 2025-04-28 RX ADMIN — Medication 400 MILLIGRAM(S): at 16:38

## 2025-04-28 RX ADMIN — GABAPENTIN 300 MILLIGRAM(S): 400 CAPSULE ORAL at 21:49

## 2025-04-28 RX ADMIN — Medication 30 MILLILITER(S): at 17:20

## 2025-04-28 RX ADMIN — Medication 20 MILLIGRAM(S): at 17:22

## 2025-04-28 RX ADMIN — KETOROLAC TROMETHAMINE 15 MILLIGRAM(S): 30 INJECTION, SOLUTION INTRAMUSCULAR; INTRAVENOUS at 23:15

## 2025-04-28 RX ADMIN — KETOROLAC TROMETHAMINE 15 MILLIGRAM(S): 30 INJECTION, SOLUTION INTRAMUSCULAR; INTRAVENOUS at 22:49

## 2025-04-28 NOTE — ED ADULT NURSE NOTE - OBJECTIVE STATEMENT
Ledy RN: Received pt to room 23 as notification with c/o chest pain and possible STEMI. Pt is a 63 y/o Female, A&Ox4, ambulatory with a walker, with a PHx of MS.  Pt's home aid at bedside states she was bathing pt in the shower when pt starting c/o chest pain. Pt reports pain radiates from right side of chest to left side and throughout abdomen. EKG obtained. Respirations even and unlabored, chest rise equal b/l. NSR noted on monitor. VS as noted in flow sheets. Pt denies palpitations, SOB, fever, cough, chills, N/V/D, h/a, dizziness, numbness/tingling or any urinary symptoms at this time. 20g IV in right hand from EMS.  20g IV placed in right forearm. Labs collected and sent. No acute distress noted. Safety maintained throughout. Report given to primary RN. Ledy RN: Received pt to room 23 as notification with c/o sudden onset chest pain and possible MI as per EMS. Pt is a 65 y/o Female, A&Ox4, ambulatory with a walker, with a PHx of MS. Pt's home aid at bedside states she was bathing pt in the shower when pt starting c/o chest pain. Pt reports pain radiates from right side of chest to left side and throughout abdomen. EKG obtained. Respirations even and unlabored, chest rise equal b/l. NSR noted on monitor. VS as noted in flow sheets. Pt denies palpitations, SOB, fever, cough, chills, N/V/D, h/a, dizziness, numbness/tingling or any urinary symptoms at this time. 20g IV in right hand from EMS.  20g IV placed in right forearm. Labs collected and sent. No acute distress noted. Safety maintained throughout. Report given to primary RN. negative

## 2025-04-28 NOTE — ED ADULT NURSE REASSESSMENT NOTE - NS ED NURSE REASSESS COMMENT FT1
Received report from KARRI Cage. Pt is A&Ox4. Pt appears comfortable and in NAD. Pt has no complaints at this time. Respirations even and unlabored. VS as noted. NSR on cardiac monitor. Safety maintained with stretcher in lowest position.
BREAK RN: pt. remains A&Ox4, awake and resting. pt. offers no new complaints at this time. no acute distress noted. respirations even and unlabored. NSR on cardiac monitor. VS as noted.

## 2025-04-28 NOTE — ED PROVIDER NOTE - PROGRESS NOTE DETAILS
Adebayo Grimm MD (PGY2): Received signout from outgoing provider.  64-year-old female PMH significant for MS patient came in as a STEMI alert however with negative EKG.  Laboratory work non-actionable, initial troponin 6 with repeat of left duplex ultrasound negative for DVT.  Plan for CDU for cardiac stress testing.

## 2025-04-28 NOTE — ED CDU PROVIDER INITIAL DAY NOTE - OBJECTIVE STATEMENT
Patient is a 64-year-old female with past medical history of migraines presenting with left-sided chest pain starting today.  EMS EKG read as inferior STEMI, no acute ischemic changes noted on initial EKG here.  Patient denies any associated fever, chills, shortness of breath, nausea, vomiting.    ED HPI As above, noted  Pt is a 63 YO F with PMH Migraines, MS who presented to ED with chest pain.  Pt reports she developed chest pain this AM after her bath. Pt reports pain was across both sides of her chest with associated nausea. Pt arrived to ED as STEMI alert due to abnormal EKG with EMS. Pt EKG non ischemic on arrival. In ED, trop 6--><6, duplex neg for DVT, CXR clear. Pt placed in CDU for cardiac monitoring, stress, tele doc eval.

## 2025-04-28 NOTE — ED CDU PROVIDER INITIAL DAY NOTE - CLINICAL SUMMARY MEDICAL DECISION MAKING FREE TEXT BOX
Pt is a 63 YO F with PMH Migraines, MS who presented to ED with chest pain.  Pt reports she developed chest pain this AM after her bath. Pt reports pain was across both sides of her chest with associated nausea. Pt arrived to ED as STEMI alert due to abnormal EKG with EMS. Pt EKG non ischemic on arrival. In ED, trop 6--><6, duplex neg for DVT, CXR clear. Pt placed in CDU for cardiac monitoring, stress, tele doc eval. Pt is a 63 YO F with PMH Migraines, MS who presented to ED with chest pain.  Pt reports she developed chest pain this AM after her bath. Pt reports pain was across both sides of her chest with associated nausea. Pt arrived to ED as STEMI alert due to abnormal EKG with EMS. Pt EKG non ischemic on arrival. In ED, trop 6--><6, duplex neg for DVT, CXR clear. Pt placed in CDU for cardiac monitoring, stress, tele doc eval.    Freddy: Patient is a 64-year-old who presents with acute chest pain.  Patient initial EKG in the emergency room did not show MI labs elevated but not changing significantly.  Patient evaluated in the ED but high enough risk for MI that further monitoring and cardiac evaluation with stress test seems the safest course of action for this patient. Lab studies ordered, independently reviewed and acted on as appropriate.

## 2025-04-28 NOTE — ED CDU PROVIDER INITIAL DAY NOTE - ATTENDING APP SHARED VISIT CONTRIBUTION OF CARE
I Kennedy Hayes MD have personally performed a face to face diagnostic evaluation on this patient.  I have reviewed the ACP note and agree with the history, exam, and plan of care, except as noted.   My medical decison making and observations are found above.  The patient is stable for CDU.  lungs clear, abd soft

## 2025-04-28 NOTE — ED PROVIDER NOTE - ATTENDING CONTRIBUTION TO CARE
I performed a history and physical exam of the patient and discussed their management with the resident and /or advanced care provider. I reviewed the resident and /or ACP's note and agree with the documented findings and plan of care. My medical decision making and observations are found above.  Lungs clear, abd soft non tender

## 2025-04-28 NOTE — ED CDU PROVIDER INITIAL DAY NOTE - ENMT, MLM
Airway patent. Nasal mucosa clear. Mouth with normal mucosa. Throat has no vesicles, no oropharyngeal exudates and uvula is midline. No

## 2025-04-28 NOTE — ED ADULT NURSE NOTE - NSFALLUNIVINTERV_ED_ALL_ED
Bed/Stretcher in lowest position, wheels locked, appropriate side rails in place/Call bell, personal items and telephone in reach/Instruct patient to call for assistance before getting out of bed/chair/stretcher/Non-slip footwear applied when patient is off stretcher/[employer] [place] to call system/Physically safe environment - no spills, clutter or unnecessary equipment/Purposeful proactive rounding/Room/bathroom lighting operational, light cord in reach

## 2025-04-28 NOTE — ED PROVIDER NOTE - OBJECTIVE STATEMENT
Patient is a 64-year-old female with past medical history of migraines presenting with left-sided chest pain starting today.  EMS EKG read as inferior STEMI, no acute ischemic changes noted on initial EKG here.  Patient denies any associated fever, chills, shortness of breath, nausea, vomiting.

## 2025-04-28 NOTE — ED PROVIDER NOTE - CLINICAL SUMMARY MEDICAL DECISION MAKING FREE TEXT BOX
Freddy: Patient is 64-year-old who presents to the emergency department with acute onset of chest pain started on her right side went all the way across to her left side and into her left arm.  No radiation to the jaw or back.  Patient has MS and denies hypertension or diabetes.  Patient is not short of breath.  Patient originally called in by EMS for possible MI however EKG shows no acute ischemia.  Pain started at 1030 so will do a first set of labs but will have to do another 1 after a full 3 hours is passed.  Will also work on symptom control. Lab studies ordered, independently reviewed and acted on as appropriate.

## 2025-04-28 NOTE — ED PROVIDER NOTE - PHYSICAL EXAMINATION
General: no acute distress  Psych: mood appropriate  Head: normocephalic; atraumatic  Eyes: conjunctivae clear bilaterally, sclerae anicteric  ENT: no nasal flaring, patent nares  Cardio: regular rate and rhythm; normal heart sounds  Resp: clear to auscultation bilaterally  GI: abdomen soft, nontender, nondistended  Neuro: A&Ox3  Skin: no rashes or bruising noted  MSK: normal movement of extremities

## 2025-04-29 VITALS
RESPIRATION RATE: 16 BRPM | DIASTOLIC BLOOD PRESSURE: 75 MMHG | SYSTOLIC BLOOD PRESSURE: 120 MMHG | HEART RATE: 70 BPM | TEMPERATURE: 98 F | OXYGEN SATURATION: 99 %

## 2025-04-29 PROCEDURE — 93016 CV STRESS TEST SUPVJ ONLY: CPT | Mod: GC

## 2025-04-29 PROCEDURE — 99239 HOSP IP/OBS DSCHRG MGMT >30: CPT

## 2025-04-29 PROCEDURE — 93018 CV STRESS TEST I&R ONLY: CPT | Mod: GC

## 2025-04-29 PROCEDURE — 78451 HT MUSCLE IMAGE SPECT SING: CPT | Mod: 26

## 2025-04-29 RX ADMIN — GABAPENTIN 300 MILLIGRAM(S): 400 CAPSULE ORAL at 14:25

## 2025-04-29 RX ADMIN — GABAPENTIN 300 MILLIGRAM(S): 400 CAPSULE ORAL at 05:52

## 2025-04-29 NOTE — ED CDU PROVIDER SUBSEQUENT DAY NOTE - ATTENDING APP SHARED VISIT CONTRIBUTION OF CARE
Pt was seen and evaluated by me. Pt is 65 y/o female with PMHx of Migraines and MS who presented to the ED for chest pain on the day of presentation. Pt states having chest pain after bath with associated nausea. Pt denies any fever, chills, SOB, vomiting, or abd pain. Pt was seen in the ED and had labs with trop 6 and then < 6. Pt was sent to OBS for stress and tele monitoring.   VITALS: Vitals have been reviewed.  GEN APPEARANCE: Alert and cooperative, non-toxic appearing and in NAD  HEAD: Atraumatic, normocephalic.   EYES: PERRL, EOMI.   EARS: Gross hearing intact.   NOSE: No nasal discharge.   THROAT: MMM. Oral cavity and pharynx normal.   CV: RRR, S1S2, no c/r/m/g. No cyanosis or pallor.   LUNGS: CTAB. No wheezing. No rales. No rhonchi. No diminished breath sounds.   ABDOMEN: Soft, NTND. No guarding or rebound.   MSK/EXT: Spine appears normal.  NEURO: Alert, follows commands. Speech normal. Sensation and motor normal x4 extremities.   SKIN: Normal color for race, warm, dry and intact. No evidence of rash.  65 y/o female with PMHx of Migraines and MS who presented to the ED for chest pain on the day of presentation.  Concern for ACS, Musculoskeletal  Sent to OBS for tele monitoring and stress

## 2025-04-29 NOTE — ED CDU PROVIDER DISPOSITION NOTE - NSFOLLOWUPINSTRUCTIONS_ED_ALL_ED_FT
Follow up with your primary care doctor within 1 week, show copies of your results, discuss seeing a GI doctor (gastroenterologist, referral list attached as well)  Follow up with cardiology, referral list attached  Continue taking medications as previously prescribed to you  Return to the ER with any worsening or concerning symptoms, worsening pain, shortness of breath, dizziness, weakness, nausea or any other concerns.

## 2025-04-29 NOTE — ED CDU PROVIDER DISPOSITION NOTE - CLINICAL COURSE
"Pt is a 65 YO F with PMH Migraines, MS who presented to ED with chest pain.  Pt reports she developed chest pain this AM after her bath. Pt reports pain was across both sides of her chest with associated nausea. Pt arrived to ED as STEMI alert due to abnormal EKG with EMS. Pt EKG non ischemic on arrival. In ED, trop 6--><6, duplex neg for DVT, CXR clear. Pt placed in CDU for cardiac monitoring, stress, tele doc eval.  In the interim- no events on tele, still with mild pain."  4/28/25- Stress test resulted __

## 2025-04-29 NOTE — ED CDU PROVIDER DISPOSITION NOTE - ATTENDING APP SHARED VISIT CONTRIBUTION OF CARE
Pt was seen and evaluated by me. Pt is 63 y/o female with PMHx of Migraines and MS who presented to the ED for chest pain on the day of presentation. Pt states having chest pain after bath with associated nausea. Pt denies any fever, chills, SOB, vomiting, or abd pain. Pt was seen in the ED and had labs with trop 6 and then < 6. Pt was sent to OBS for stress and tele monitoring.   VITALS: Vitals have been reviewed.  GEN APPEARANCE: Alert and cooperative, non-toxic appearing and in NAD  HEAD: Atraumatic, normocephalic.   EYES: PERRL, EOMI.   EARS: Gross hearing intact.   NOSE: No nasal discharge.   THROAT: MMM. Oral cavity and pharynx normal.   CV: RRR, S1S2, no c/r/m/g. No cyanosis or pallor.   LUNGS: CTAB. No wheezing. No rales. No rhonchi. No diminished breath sounds.   ABDOMEN: Soft, NTND. No guarding or rebound.   MSK/EXT: Spine appears normal.  NEURO: Alert, follows commands. Speech normal. Sensation and motor normal x4 extremities.   SKIN: Normal color for race, warm, dry and intact. No evidence of rash.  63 y/o female with PMHx of Migraines and MS who presented to the ED for chest pain on the day of presentation.  Concern for ACS, Musculoskeletal  Sent to OBS for tele monitoring and stress

## 2025-04-29 NOTE — CONSULT NOTE ADULT - NS ATTEND AMEND GEN_ALL_CORE FT
Patient seen and examined. Agree with plan as detailed in PA/NP Note.     NST with normal perfusion  Euvolemic on exam      Carlos Brown MD  Pager: [phone]  Office: [phone]

## 2025-04-29 NOTE — ED CDU PROVIDER DISPOSITION NOTE - PATIENT PORTAL LINK FT
You can access the FollowMyHealth Patient Portal offered by [facility] by registering at the following website: [email or web address]. By joining [employer]’s FollowMyHealth portal, you will also be able to view your health information using other applications (apps) compatible with our system.

## 2025-04-29 NOTE — ED CDU PROVIDER SUBSEQUENT DAY NOTE - HISTORY
Pt is a 65 YO F with PMH Migraines, MS who presented to ED with chest pain.  Pt reports she developed chest pain this AM after her bath. Pt reports pain was across both sides of her chest with associated nausea. Pt arrived to ED as STEMI alert due to abnormal EKG with EMS. Pt EKG non ischemic on arrival. In ED, trop 6--><6, duplex neg for DVT, CXR clear. Pt placed in CDU for cardiac monitoring, stress, tele doc eval.  In the interim- no events on tele, still with mild pain

## 2025-04-29 NOTE — PROVIDER CONTACT NOTE (OTHER) - SITUATION
Per provider, patient is cleared for discharge and needs an ambulette/ambulance to return home as she is wheelchair-bound., pmhx of MS.

## 2025-04-29 NOTE — ED CDU PROVIDER SUBSEQUENT DAY NOTE - PROGRESS NOTE DETAILS
Stress test resulted normal without evidence of ischemia or infarction. Pt seen by cardiology , cleared for d/c from cardiac perspective. Spoke with SW will assist with transportation home. Pt reassessed and reports she is feeling well, no cp at present. At time of discharge vitals stable, pt is well appearing. Pt to follow up with her PMD and will return to the ER with any worsening or concerning symptoms.

## 2025-04-29 NOTE — ED CDU PROVIDER SUBSEQUENT DAY NOTE - CLINICAL SUMMARY MEDICAL DECISION MAKING FREE TEXT BOX
Pt is a 63 YO F with PMH Migraines, MS who presented to ED with chest pain.  Pt reports she developed chest pain this AM after her bath. Pt reports pain was across both sides of her chest with associated nausea. Pt arrived to ED as STEMI alert due to abnormal EKG with EMS. Pt EKG non ischemic on arrival. In ED, trop 6--><6, duplex neg for DVT, CXR clear. Pt placed in CDU for cardiac monitoring, stress, tele doc eval.

## 2025-04-29 NOTE — PROVIDER CONTACT NOTE (OTHER) - DATE AND TIME:
29-Apr-2025 15:24 -Follow up with PODIATRY for further evaluation and management of your TOE PAIN    -Medications have been sent to your pharmacy. Pick them up and take them as directed.     -For pain you can take ibuprofen (motrin, advil) or acetaminophen (tylenol) as needed, as directed on the packaging.     -Follow up with you primary care doctor in the next 2 days. The results from today's visit have been provided for him/her to review. If you do not have a primary care doctor call [phone] to find a primary care doctor OR make an appointment with [facility] in [place] at [phone]    -Return to the ER with any new or worsening symptoms including worsening pain, fevers, confusion, bleeding.

## 2025-04-29 NOTE — PROVIDER CONTACT NOTE (OTHER) - ACTION/TREATMENT ORDERED:
Transportation coordinated via [facility] with [facility] [phone] for ASAP. ETA is 4:30pm. Trip#: [id number].

## 2025-04-29 NOTE — PROVIDER CONTACT NOTE (OTHER) - ASSESSMENT
KAYLYN met with patient and her sister at bedside and was able to confirm home address. Pt is registered as self-pay. Sister provided Medicaid card [place]: [id number], but this shows as inactive medicaid. [place] approved by KAYLYN manager  for ambulance.

## 2025-05-14 NOTE — ED PROVIDER NOTE - NS_EDPROVIDERDISPOUSERTYPE_ED_A_ED
Attending Attestation (For Attendings USE Only)...
Have You Had Botox Before?: has had botox
When Was Your Last Botox Treatment?: 1/14/25